# Patient Record
Sex: FEMALE | Race: OTHER | HISPANIC OR LATINO | Employment: FULL TIME | URBAN - METROPOLITAN AREA
[De-identification: names, ages, dates, MRNs, and addresses within clinical notes are randomized per-mention and may not be internally consistent; named-entity substitution may affect disease eponyms.]

---

## 2022-11-14 ENCOUNTER — OCCMED (OUTPATIENT)
Dept: URGENT CARE | Facility: CLINIC | Age: 47
End: 2022-11-14

## 2022-11-14 ENCOUNTER — APPOINTMENT (OUTPATIENT)
Dept: LAB | Facility: CLINIC | Age: 47
End: 2022-11-14

## 2022-11-14 DIAGNOSIS — Z02.1 ENCOUNTER FOR PRE-EMPLOYMENT HEALTH SCREENING EXAMINATION: Primary | ICD-10-CM

## 2022-11-14 DIAGNOSIS — Z02.1 ENCOUNTER FOR PRE-EMPLOYMENT HEALTH SCREENING EXAMINATION: ICD-10-CM

## 2022-11-14 LAB — RUBV IGG SERPL IA-ACNC: >175 IU/ML

## 2022-11-15 LAB
MEV IGG SER QL IA: NORMAL
MUV IGG SER QL IA: NORMAL
VZV IGG SER QL IA: NORMAL

## 2022-11-16 LAB
GAMMA INTERFERON BACKGROUND BLD IA-ACNC: 0.04 IU/ML
M TB IFN-G BLD-IMP: NEGATIVE
M TB IFN-G CD4+ BCKGRND COR BLD-ACNC: -0.01 IU/ML
M TB IFN-G CD4+ BCKGRND COR BLD-ACNC: 0 IU/ML
MITOGEN IGNF BCKGRD COR BLD-ACNC: >10 IU/ML

## 2023-01-12 ENCOUNTER — HOSPITAL ENCOUNTER (EMERGENCY)
Facility: HOSPITAL | Age: 48
Discharge: HOME | End: 2023-01-12
Attending: EMERGENCY MEDICINE

## 2023-01-12 VITALS
TEMPERATURE: 98.3 F | WEIGHT: 196.6 LBS | HEART RATE: 76 BPM | OXYGEN SATURATION: 100 % | SYSTOLIC BLOOD PRESSURE: 150 MMHG | RESPIRATION RATE: 20 BRPM | DIASTOLIC BLOOD PRESSURE: 93 MMHG

## 2023-01-12 DIAGNOSIS — R51.9 HEADACHE: Primary | ICD-10-CM

## 2023-01-12 DIAGNOSIS — M62.838 MUSCLE SPASMS OF NECK: ICD-10-CM

## 2023-01-12 RX ORDER — METHOCARBAMOL 500 MG/1
500 TABLET, FILM COATED ORAL ONCE
Status: COMPLETED | OUTPATIENT
Start: 2023-01-12 | End: 2023-01-12

## 2023-01-12 RX ORDER — METHOCARBAMOL 500 MG/1
500 TABLET, FILM COATED ORAL 2 TIMES DAILY
Qty: 20 TABLET | Refills: 0 | Status: SHIPPED | OUTPATIENT
Start: 2023-01-12

## 2023-01-12 RX ORDER — IBUPROFEN 600 MG/1
600 TABLET ORAL EVERY 6 HOURS PRN
Qty: 30 TABLET | Refills: 0 | Status: SHIPPED | OUTPATIENT
Start: 2023-01-12

## 2023-01-12 RX ORDER — KETOROLAC TROMETHAMINE 30 MG/ML
15 INJECTION, SOLUTION INTRAMUSCULAR; INTRAVENOUS ONCE
Status: COMPLETED | OUTPATIENT
Start: 2023-01-12 | End: 2023-01-12

## 2023-01-12 RX ADMIN — METHOCARBAMOL 500 MG: 500 TABLET, FILM COATED ORAL at 14:27

## 2023-01-12 RX ADMIN — KETOROLAC TROMETHAMINE 15 MG: 30 INJECTION, SOLUTION INTRAMUSCULAR; INTRAVENOUS at 14:27

## 2023-01-12 NOTE — Clinical Note
Terrence Sunnyshaan was seen and treated in our emergency department on 1/12/2023  Diagnosis:     Faraz Morris  is off the rest of the shift today  She may return on this date: 01/15/2023         If you have any questions or concerns, please don't hesitate to call        Jessenia Baptiste PA-C    ______________________________           _______________          _______________  Hospital Representative                              Date                                Time

## 2023-01-12 NOTE — ED PROVIDER NOTES
History  Chief Complaint   Patient presents with   • Headache   • Dizziness     Started 5 days ago, brain pain     52 y o  F with no reported PMH presents to ED c/o Intermittent headaches x 5 days  Worse with work, stress  This happened before, similar to previous headaches  History provided by:  Patient   used: Yes    Headache  Associated symptoms: dizziness and myalgias    Associated symptoms: no abdominal pain, no back pain, no blurred vision, no congestion, no cough, no diarrhea, no drainage, no ear pain, no eye pain, no facial pain, no fatigue, no fever, no focal weakness, no hearing loss, no loss of balance, no nausea, no near-syncope, no neck pain, no neck stiffness, no numbness, no paresthesias, no photophobia, no seizures, no sinus pressure, no sore throat, no swollen glands, no syncope, no tingling, no URI, no visual change, no vomiting and no weakness    Dizziness:     Severity:  Mild    Timing:  Sporadic  Dizziness  Quality:  Lightheadedness  Timing:  Sporadic  Progression:  Resolved  Associated symptoms: headaches    Associated symptoms: no blood in stool, no chest pain, no diarrhea, no hearing loss, no nausea, no palpitations, no shortness of breath, no syncope, no tinnitus, no vision changes, no vomiting and no weakness        None       History reviewed  No pertinent past medical history  History reviewed  No pertinent surgical history  History reviewed  No pertinent family history  I have reviewed and agree with the history as documented  E-Cigarette/Vaping     E-Cigarette/Vaping Substances     Social History     Tobacco Use   • Smoking status: Never   • Smokeless tobacco: Never   Substance Use Topics   • Alcohol use: Never   • Drug use: Never       Review of Systems   Constitutional: Negative for chills, fatigue and fever  HENT: Negative for congestion, ear pain, hearing loss, postnasal drip, sinus pressure, sore throat and tinnitus      Eyes: Negative for blurred vision, photophobia, pain, redness and visual disturbance  Respiratory: Negative for cough and shortness of breath  Cardiovascular: Negative for chest pain, palpitations, syncope and near-syncope  Gastrointestinal: Negative for abdominal pain, blood in stool, diarrhea, nausea and vomiting  Musculoskeletal: Positive for myalgias  Negative for back pain, gait problem, neck pain and neck stiffness  Skin: Negative for color change and rash  Neurological: Positive for dizziness and headaches  Negative for focal weakness, seizures, syncope, facial asymmetry, speech difficulty, weakness, numbness, paresthesias and loss of balance  All other systems reviewed and are negative  Physical Exam  Physical Exam  Vitals and nursing note reviewed  Constitutional:       General: She is awake  She is not in acute distress  Appearance: Normal appearance  She is well-developed  She is not diaphoretic  HENT:      Head: Normocephalic and atraumatic  No contusion, masses, right periorbital erythema or left periorbital erythema  Jaw: There is normal jaw occlusion  Right Ear: Tympanic membrane, ear canal and external ear normal       Left Ear: Tympanic membrane, ear canal and external ear normal       Nose: Nose normal  No congestion or rhinorrhea  Mouth/Throat:      Lips: Pink  Mouth: Mucous membranes are moist       Pharynx: Oropharynx is clear  Uvula midline  Eyes:      General: Lids are normal  Vision grossly intact  Gaze aligned appropriately  No visual field deficit  Right eye: No discharge  Left eye: No discharge  Extraocular Movements: Extraocular movements intact  Right eye: Normal extraocular motion and no nystagmus  Left eye: Normal extraocular motion and no nystagmus  Conjunctiva/sclera: Conjunctivae normal       Right eye: Right conjunctiva is not injected  Left eye: Left conjunctiva is not injected        Pupils: Pupils are equal, round, and reactive to light  Visual Fields: Right eye visual fields normal and left eye visual fields normal       Comments: No photophobia noted  Neck:      Trachea: Phonation normal      Cardiovascular:      Rate and Rhythm: Normal rate and regular rhythm  Heart sounds: Normal heart sounds  No murmur heard  Pulmonary:      Effort: Pulmonary effort is normal  No respiratory distress  Breath sounds: Normal breath sounds  Abdominal:      Palpations: Abdomen is soft  Tenderness: There is no abdominal tenderness  Musculoskeletal:         General: Normal range of motion  Cervical back: Full passive range of motion without pain, normal range of motion and neck supple  Spasms and tenderness present  No swelling, bony tenderness or crepitus  Muscular tenderness present  No pain with movement or spinous process tenderness  Normal range of motion  Right lower leg: No edema  Left lower leg: No edema  Comments: B/l trapezius muscle spasms    Lymphadenopathy:      Cervical: No cervical adenopathy  Skin:     General: Skin is warm  Capillary Refill: Capillary refill takes less than 2 seconds  Coloration: Skin is not pale  Findings: No rash  Neurological:      General: No focal deficit present  Mental Status: She is alert and oriented to person, place, and time  Sensory: No sensory deficit  Motor: No weakness  Coordination: Coordination normal       Gait: Gait normal       Comments: GCS 15  AAOx4  No focal neuro deficits  CN II-XII intact  PERRL  EOMI  No pronator drift   strength 5/5 bilaterally  B/L UE strength 5/5 throughout  Finger to nose, heel shin, rapid alternating movements Cerebellar function normal  Ambulates without difficulty  B/L LE strength 5/5 throughout   Gross sensation to b/l upper and lower extremities intact        Psychiatric:         Mood and Affect: Mood normal          Behavior: Behavior normal  Behavior is cooperative  Thought Content: Thought content normal          Vital Signs  ED Triage Vitals   Temperature Pulse Respirations Blood Pressure SpO2   01/12/23 1317 01/12/23 1317 01/12/23 1317 01/12/23 1317 01/12/23 1317   98 3 °F (36 8 °C) 76 20 150/93 100 %      Temp Source Heart Rate Source Patient Position - Orthostatic VS BP Location FiO2 (%)   01/12/23 1317 01/12/23 1317 01/12/23 1317 01/12/23 1317 --   Tympanic Monitor Sitting Left arm       Pain Score       01/12/23 1427       8           Vitals:    01/12/23 1317   BP: 150/93   Pulse: 76   Patient Position - Orthostatic VS: Sitting         Visual Acuity      ED Medications  Medications   ketorolac (TORADOL) injection 15 mg (15 mg Intramuscular Given 1/12/23 1427)   methocarbamol (ROBAXIN) tablet 500 mg (500 mg Oral Given 1/12/23 1427)       Diagnostic Studies  Results Reviewed     None                 No orders to display              Procedures  Procedures         ED Course  ED Course as of 01/14/23 0213   Thu Jan 12, 2023   1349 States she had a hysterectomy in 2011    1416 Has ride home    1446 Denies dizziness    1459 Reports improvement in headache                                              Medical Decision Making  Headache was not acute or maximal in onset  Headache similar to previous headaches  There were no focal neurodeficits on exam  Muscular tenderness on exam   Low clinical suspicion for CVA  Do not suspect SAH, temporal arteritis, meningitis, encephalitis, CO poisoning, acute angle closure glaucoma, dural venous sinus thrombosis as cause of headache  Do not feel that further imaging or workup (including LP) are warranted at this time  Suspect tension HA  Symptoms improved with treatment  Patient will follow up with PCP and return to ED if symptoms worsen or persist      All imaging and/or lab testing discussed with patient, strict return to ED precautions discussed   Patient recommended to follow up promptly with appropriate outpatient provider  Patient and/or family members verbalizes understanding and agrees with plan  Patient and/or family members were given opportunity to ask questions, all questions were answered at this time  Patient is stable for discharge      Portions of the record may have been created with voice recognition software  Occasional wrong word or "sound a like" substitutions may have occurred due to the inherent limitations of voice recognition software  Read the chart carefully and recognize, using context, where substitutions have occurred  Headache: acute illness or injury  Muscle spasms of neck: acute illness or injury  Risk  Prescription drug management  Disposition  Final diagnoses:   Headache   Muscle spasms of neck     Time reflects when diagnosis was documented in both MDM as applicable and the Disposition within this note     Time User Action Codes Description Comment    1/12/2023  2:31 PM Jona Ziyad Add [R51 9] Headache     1/12/2023  2:31 PM Dover Ziyad Add [P94 663] Muscle spasms of neck       ED Disposition     ED Disposition   Discharge    Condition   Stable    Date/Time   Thu Jan 12, 2023  2:40 PM    Comment   Olman Farley discharge to home/self care                 Follow-up Information     Follow up With Specialties Details Why Contact Info    DOROTEO Marr Nurse Practitioner Schedule an appointment as soon as possible for a visit  For follow up regarding your symptoms [address] [place] [address]  [phone]            Discharge Medication List as of 1/12/2023  3:00 PM      START taking these medications    Details   ibuprofen (MOTRIN) 600 mg tablet Take 1 tablet (600 mg total) by mouth every 6 (six) hours as needed for mild pain, moderate pain or headaches, Starting Thu 1/12/2023, Normal      methocarbamol (ROBAXIN) 500 mg tablet Take 1 tablet (500 mg total) by mouth 2 (two) times a day, Starting Thu 1/12/2023, Normal             No discharge procedures on file     PDMP Review     None          ED Provider  Electronically Signed by           Isela Doan PA-C  01/14/23 9674

## 2023-01-31 ENCOUNTER — PATIENT OUTREACH (OUTPATIENT)
Dept: OTHER | Facility: OTHER | Age: 48
End: 2023-01-31

## 2023-01-31 ENCOUNTER — TELEPHONE (OUTPATIENT)
Dept: ADMINISTRATIVE | Facility: OTHER | Age: 48
End: 2023-01-31

## 2023-01-31 ENCOUNTER — OFFICE VISIT (OUTPATIENT)
Dept: FAMILY MEDICINE CLINIC | Facility: CLINIC | Age: 48
End: 2023-01-31

## 2023-01-31 VITALS
WEIGHT: 200.6 LBS | OXYGEN SATURATION: 98 % | SYSTOLIC BLOOD PRESSURE: 122 MMHG | RESPIRATION RATE: 20 BRPM | HEIGHT: 69 IN | HEART RATE: 80 BPM | TEMPERATURE: 98.5 F | BODY MASS INDEX: 29.71 KG/M2 | DIASTOLIC BLOOD PRESSURE: 80 MMHG

## 2023-01-31 DIAGNOSIS — N95.1 MENOPAUSAL SYNDROME ON HORMONE REPLACEMENT THERAPY: ICD-10-CM

## 2023-01-31 DIAGNOSIS — Z00.00 ANNUAL PHYSICAL EXAM: ICD-10-CM

## 2023-01-31 DIAGNOSIS — Z76.89 ENCOUNTER TO ESTABLISH CARE: Primary | ICD-10-CM

## 2023-01-31 DIAGNOSIS — Z79.890 MENOPAUSAL SYNDROME ON HORMONE REPLACEMENT THERAPY: ICD-10-CM

## 2023-01-31 DIAGNOSIS — E66.3 OVERWEIGHT (BMI 25.0-29.9): ICD-10-CM

## 2023-01-31 DIAGNOSIS — M79.671 RIGHT FOOT PAIN: ICD-10-CM

## 2023-01-31 DIAGNOSIS — Z11.4 SCREENING FOR HIV (HUMAN IMMUNODEFICIENCY VIRUS): ICD-10-CM

## 2023-01-31 DIAGNOSIS — Z12.11 SCREENING FOR COLON CANCER: ICD-10-CM

## 2023-01-31 DIAGNOSIS — E11.9 TYPE 2 DIABETES MELLITUS WITHOUT COMPLICATION, WITHOUT LONG-TERM CURRENT USE OF INSULIN (HCC): ICD-10-CM

## 2023-01-31 DIAGNOSIS — Z11.59 NEED FOR HEPATITIS C SCREENING TEST: ICD-10-CM

## 2023-01-31 DIAGNOSIS — K21.9 GASTROESOPHAGEAL REFLUX DISEASE WITHOUT ESOPHAGITIS: ICD-10-CM

## 2023-01-31 DIAGNOSIS — E55.9 VITAMIN D DEFICIENCY: ICD-10-CM

## 2023-01-31 DIAGNOSIS — E78.1 HYPERTRIGLYCERIDEMIA: ICD-10-CM

## 2023-01-31 DIAGNOSIS — G62.9 NEUROPATHY: ICD-10-CM

## 2023-01-31 PROBLEM — E78.5 HYPERLIPIDEMIA: Status: ACTIVE | Noted: 2022-08-14

## 2023-01-31 LAB
CREAT UR-MCNC: 73.3 MG/DL
LEFT EYE DIABETIC RETINOPATHY: NORMAL
LEFT EYE IMAGE QUALITY: NORMAL
LEFT EYE MACULAR EDEMA: NORMAL
LEFT EYE OTHER RETINOPATHY: NORMAL
MICROALBUMIN UR-MCNC: 29.1 MG/L (ref 0–20)
MICROALBUMIN/CREAT 24H UR: 40 MG/G CREATININE (ref 0–30)
RIGHT EYE DIABETIC RETINOPATHY: NORMAL
RIGHT EYE IMAGE QUALITY: NORMAL
RIGHT EYE MACULAR EDEMA: NORMAL
RIGHT EYE OTHER RETINOPATHY: NORMAL
SEVERITY (EYE EXAM): NORMAL

## 2023-01-31 RX ORDER — METFORMIN HYDROCHLORIDE 1000 MG/1
1000 TABLET, FILM COATED, EXTENDED RELEASE ORAL 2 TIMES DAILY WITH MEALS
Qty: 180 TABLET | Refills: 1 | Status: SHIPPED | OUTPATIENT
Start: 2023-01-31 | End: 2023-02-02 | Stop reason: CLARIF

## 2023-01-31 RX ORDER — LANCETS 33 GAUGE
EACH MISCELLANEOUS
Qty: 100 EACH | Refills: 3 | Status: SHIPPED | OUTPATIENT
Start: 2023-01-31

## 2023-01-31 RX ORDER — LOSARTAN POTASSIUM 50 MG/1
50 TABLET ORAL DAILY
Qty: 90 TABLET | Refills: 1 | Status: SHIPPED | OUTPATIENT
Start: 2023-01-31

## 2023-01-31 RX ORDER — ESTRADIOL 0.1 MG/D
1 FILM, EXTENDED RELEASE TRANSDERMAL 2 TIMES WEEKLY
COMMUNITY
Start: 2022-10-17 | End: 2023-10-17

## 2023-01-31 RX ORDER — LOSARTAN POTASSIUM 50 MG/1
50 TABLET ORAL DAILY
COMMUNITY
End: 2023-01-31 | Stop reason: SDUPTHER

## 2023-01-31 RX ORDER — ATORVASTATIN CALCIUM 40 MG/1
40 TABLET, FILM COATED ORAL DAILY
COMMUNITY
End: 2023-02-03 | Stop reason: ALTCHOICE

## 2023-01-31 RX ORDER — LANSOPRAZOLE 30 MG/1
TABLET, ORALLY DISINTEGRATING, DELAYED RELEASE ORAL EVERY 24 HOURS
COMMUNITY
End: 2023-01-31 | Stop reason: ALTCHOICE

## 2023-01-31 RX ORDER — MELOXICAM 7.5 MG/1
15 TABLET ORAL DAILY
Qty: 60 TABLET | Refills: 0 | Status: SHIPPED | OUTPATIENT
Start: 2023-01-31

## 2023-01-31 RX ORDER — METFORMIN HYDROCHLORIDE 1000 MG/1
1000 TABLET, FILM COATED, EXTENDED RELEASE ORAL
COMMUNITY
End: 2023-01-31 | Stop reason: SDUPTHER

## 2023-01-31 RX ORDER — FAMOTIDINE 20 MG/1
20 TABLET, FILM COATED ORAL DAILY
COMMUNITY

## 2023-01-31 RX ORDER — ATORVASTATIN CALCIUM 20 MG/1
40 TABLET, FILM COATED ORAL DAILY
COMMUNITY
End: 2023-01-31 | Stop reason: DRUGHIGH

## 2023-01-31 RX ORDER — BLOOD SUGAR DIAGNOSTIC
STRIP MISCELLANEOUS
Qty: 100 EACH | Refills: 3 | Status: SHIPPED | OUTPATIENT
Start: 2023-01-31

## 2023-01-31 RX ORDER — BLOOD-GLUCOSE METER
KIT MISCELLANEOUS
Qty: 1 KIT | Refills: 0 | Status: SHIPPED | OUTPATIENT
Start: 2023-01-31

## 2023-01-31 RX ORDER — PREGABALIN 150 MG/1
150 CAPSULE ORAL 2 TIMES DAILY
COMMUNITY
End: 2023-01-31 | Stop reason: ALTCHOICE

## 2023-01-31 NOTE — PROGRESS NOTES
1/31/23: Client reported to her   director that she has not had her diabetes medication for 8 days  She was not feeling well  But she had difficulty making an appointment with her new insurance (hospital: [facility])  Gathered information to make appointment; suggested she go to Dr Apryl Pink  She agreed  Called Dr Abelardo Pichardo answering service who returned the call and connected me to the , Roni Ayala, of his office  She was able to make an appointment at 3 PM today for the client  Informed Zafar Steel who will give the information to Renaldo

## 2023-01-31 NOTE — TELEPHONE ENCOUNTER
----- Message from Kvng Pruitt RN sent at 1/31/2023  9:54 AM EST -----  Regarding: mammo  01/31/23 9:54 AM    Hello, our patient Fernie Mcnally has had Mammogram completed/performed  Please assist in updating the patient chart by pulling the Care Everywhere (CE) document  The date of service is 9/2022       Thank you,  Kvng Pruitt RN   [address] [facility]

## 2023-01-31 NOTE — ASSESSMENT & PLAN NOTE
Failed Gabapentin 800mg as caused dizziness and drowsiness, Lyrica 150mg BID did not help, and Cymbalta 30mg BID with no change for b/l foot nerve pain  Will check EMG due to multiple failed medications and previous diagnosis of neuropathic pain

## 2023-01-31 NOTE — PROGRESS NOTES
[address] [facility] PRIMARY CARE [facility]    NAME: Shady Hsu  AGE: 52 y o  SEX: female  : 1975     DATE: 2023     Assessment and Plan:     Problem List Items Addressed This Visit        Digestive    Gastroesophageal reflux disease without esophagitis     Continue famotidine as needed  Avoid dietary triggers, no previous EGD  Relevant Medications    famotidine (PEPCID) 20 mg tablet       Endocrine    Type 2 diabetes mellitus (HCC)     Uncontrolled  Recently moved to the [place] about 6 months ago  Has not taken metformin in 8 days  Does not recall starting Januvia  Will restart both medications at max dose  Consider GLP-1, caution with risk of pancreatitis, no history in the past, known history of very high triglycerides  Start losartan 50mg, patient was taking her sister-in-law's supply due to headaches as they seemed to help  Continue atorvastatin 40mg previously taking, consider switch to rosuvastatin pending labs  Lab Results   Component Value Date    HGBA1C 8 9 (A) 2023            Relevant Medications    losartan (COZAAR) 50 mg tablet    metFORMIN (GLUMETZA) 1000 MG (MOD) 24 hr tablet    sitaGLIPtin (JANUVIA) 100 mg tablet    Blood Glucose Monitoring Suppl (OneTouch Verio Reflect) w/Device KIT    glucose blood (OneTouch Verio) test strip    OneTouch Delica Lancets 89O MISC    Other Relevant Orders    POCT hemoglobin A1c (Completed)    Microalbumin / creatinine urine ratio (Completed)    IRIS Diabetic eye exam (Completed)       Nervous and Auditory    Neuropathy     Failed Gabapentin 800mg as caused dizziness and drowsiness, Lyrica 150mg BID did not help, and Cymbalta 30mg BID with no change for b/l foot nerve pain  Will check EMG due to multiple failed medications and previous diagnosis of neuropathic pain            Relevant Orders    EMG 2 limb lower extremity    Vitamin B12    Folate    TSH, 3rd generation with Free T4 reflex    Vitamin D 25 hydroxy    C-reactive protein       Other    Hypertriglyceridemia     Last trigs 849 in 8/2022, unable to calculate LDL  Continue atorvastatin 40mg for now, likely switch to rosuvastatin pending repeat labs  Relevant Medications    atorvastatin (LIPITOR) 40 mg tablet    Other Relevant Orders    Comprehensive metabolic panel    Lipid panel    Menopausal syndrome on hormone replacement therapy     History of hysterectomy and b/l SPO in 2011  Previously on oral estrogen but discontinued due to high CV risk, switched to estradiol patches with significant improvement  Recommend establish care with [facility]  Relevant Orders    Ambulatory Referral to Gynecology    Overweight (BMI 25 0-29  9)     BMI Counseling: Body mass index is 29 62 kg/m²  The BMI is above normal  Nutrition recommendations include reducing portion sizes, decreasing overall calorie intake and 3-5 servings of fruits/vegetables daily  Exercise recommendations include exercising 3-5 times per week and strength training exercises  Right foot pain     Suspect possible plantar fasciitis, recommend frozen water bottle exercises, consider podiatry referral  Ordered Xray due to swelling present posterior calcaneus  Start meloxicam, avoid other NSAIDs            Relevant Medications    meloxicam (Mobic) 7 5 mg tablet    Other Relevant Orders    XR foot 3+ vw right   Other Visit Diagnoses     Encounter to establish care    -  Primary    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[facility], works in nutrition services, 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 8 days    Annual physical exam        Relevant Orders    CBC and differential    Screening for HIV (human immunodeficiency virus)        Relevant Orders    : HIV 1/2 AB/AG w Reflex [facility] for 2 yr old and above    Need for hepatitis C screening test        Relevant Orders    Hepatitis C antibody    Screening for colon cancer        no previous colon CA screening, no family history    Relevant Orders    Cologuard          Immunizations and preventive care screenings were discussed with patient today  Appropriate education was printed on patient's after visit summary  Counseling:  Alcohol/drug use: discussed moderation in alcohol intake, the recommendations for healthy alcohol use, and avoidance of illicit drug use  Dental Health: discussed importance of regular tooth brushing, flossing, and dental visits  Injury prevention: discussed safety/seat belts, safety helmets, smoke detectors, carbon dioxide detectors, and smoking near bedding or upholstery  Sexual health: discussed sexually transmitted diseases, partner selection, use of condoms, avoidance of unintended pregnancy, and contraceptive alternatives  · Exercise: the importance of regular exercise/physical activity was discussed  Recommend exercise 3-5 times per week for at least 30 minutes  Depression Screening and Follow-up Plan: Patient was screened for depression during today's encounter  They screened negative with a PHQ-2 score of 0  Return in about 4 weeks (around 2/28/2023)  Chief Complaint:     Chief Complaint   Patient presents with   • Establish Care   • Diabetes      History of Present Illness:     Adult Annual Physical   Patient here for a comprehensive physical exam  The patint reports no problems  November 2022 was last visit with 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  She had uterus and bilateral ovaries removed which resulted in severe hot flashes which improved with hormone replacement  She has tried multiple pain medicines for neuropathy and has a long history of uncontrolled diabetes  She has not taken any of her medication in 8 days due to lack of refills and starting new job with new insurance  She has been taking her sister-in-law's losartan due to headaches that have improved       History was conducted in Croatian without the use of   Diet and Physical Activity  · Diet/Nutrition: well balanced diet  · Exercise: no formal exercise  Depression Screening  PHQ-2/9 Depression Screening    Little interest or pleasure in doing things: 0 - not at all  Feeling down, depressed, or hopeless: 0 - not at all  PHQ-2 Score: 0  PHQ-2 Interpretation: Negative depression screen       General Health  · Sleep: gets 4-6 hours of sleep on average  · Hearing: normal - bilateral   · Vision: wears glasses  to read  · Dental: regular dental visits and brushes teeth twice daily  /GYN Health  · Patient is: postmenopausal  · Last menstrual period:   · Contraceptive method: hysterectomy  Review of Systems:     Review of Systems   Constitutional: Negative for chills and fever  HENT: Negative for ear pain and sore throat  Eyes: Negative for pain and visual disturbance  Respiratory: Negative for cough and shortness of breath  Cardiovascular: Negative for chest pain and palpitations  Gastrointestinal: Negative for abdominal pain and vomiting  Genitourinary: Negative for dysuria and hematuria  Musculoskeletal: Positive for arthralgias, joint swelling and myalgias  Negative for back pain  Skin: Negative for color change and rash  Neurological: Positive for numbness  Negative for tremors, seizures, syncope, weakness and light-headedness  All other systems reviewed and are negative  Past Medical History:     History reviewed  No pertinent past medical history     Past Surgical History:     Past Surgical History:   Procedure Laterality Date   •  SECTION       and    • HYSTERECTOMY W/ SALPINGO-OOPHERECTOMY Bilateral     in [address]      Social History:     Social History     Socioeconomic History   • Marital status: /Civil Union     Spouse name: None   • Number of children: None   • Years of education: None   • Highest education level: None Occupational History   • None   Tobacco Use   • Smoking status: Never   • Smokeless tobacco: Never   Substance and Sexual Activity   • Alcohol use: Never   • Drug use: Never   • Sexual activity: None   Other Topics Concern   • None   Social History Narrative   • None     Social Determinants of Health     Financial Resource Strain: Not on file   Food Insecurity: Not on file   Transportation Needs: Not on file   Physical Activity: Not on file   Stress: Not on file   Social Connections: Not on file   Intimate Partner Violence: Not on file   Housing Stability: Not on file      Family History:     History reviewed  No pertinent family history  Current Medications:     Current Outpatient Medications   Medication Sig Dispense Refill   • atorvastatin (LIPITOR) 40 mg tablet Take 40 mg by mouth daily     • Blood Glucose Monitoring Suppl (OneTouch Verio Reflect) w/Device KIT Check blood sugars once daily  Please substitute with appropriate alternative as covered by patient's insurance  Dx: E11 65 1 kit 0   • estradiol (VIVELLE-DOT) 0 1 MG/24HR Place 1 patch on the skin 2 (two) times a week     • famotidine (PEPCID) 20 mg tablet Take 20 mg by mouth in the morning     • glucose blood (OneTouch Verio) test strip Check blood sugars once daily  Please substitute with appropriate alternative as covered by patient's insurance  Dx: E11 65 100 each 3   • losartan (COZAAR) 50 mg tablet Take 1 tablet (50 mg total) by mouth daily 90 tablet 1   • meloxicam (Mobic) 7 5 mg tablet Take 2 tablets (15 mg total) by mouth daily Para dolor del pie 60 tablet 0   • metFORMIN (GLUMETZA) 1000 MG (MOD) 24 hr tablet Take 1 tablet (1,000 mg total) by mouth 2 (two) times a day with meals Maryann rica pastilla dos veces al jose para la diabetes 180 tablet 1   • OneTouch Delica Lancets 68N MISC Check blood sugars once daily  Please substitute with appropriate alternative as covered by patient's insurance   Dx: E11 65 100 each 3   • sitaGLIPtin (JANUVIA) 100 mg tablet Take 1 tablet (100 mg total) by mouth daily Maryann garcia jose para la diabetes 90 tablet 1     No current facility-administered medications for this visit  Allergies:     No Known Allergies   Physical Exam:     /80 (BP Location: Left arm, Patient Position: Sitting, Cuff Size: Standard)   Pulse 80   Temp 98 5 °F (36 9 °C) (Tympanic)   Resp 20   Ht 5' 9" (1 753 m)   Wt 91 kg (200 lb 9 6 oz)   SpO2 98%   BMI 29 62 kg/m²     Physical Exam  Vitals and nursing note reviewed  Constitutional:       General: She is not in acute distress  Appearance: She is well-developed  HENT:      Head: Normocephalic and atraumatic  Right Ear: Tympanic membrane, ear canal and external ear normal       Left Ear: Tympanic membrane, ear canal and external ear normal    Eyes:      Extraocular Movements: Extraocular movements intact  Conjunctiva/sclera: Conjunctivae normal       Pupils: Pupils are equal, round, and reactive to light  Cardiovascular:      Rate and Rhythm: Normal rate and regular rhythm  Pulses: no weak pulses          Dorsalis pedis pulses are 2+ on the right side and 2+ on the left side  Posterior tibial pulses are 2+ on the right side and 2+ on the left side  Heart sounds: No murmur heard  Pulmonary:      Effort: Pulmonary effort is normal  No respiratory distress  Breath sounds: Normal breath sounds  Abdominal:      Palpations: Abdomen is soft  Tenderness: There is no abdominal tenderness  Musculoskeletal:         General: No swelling  Cervical back: Neck supple  Feet:    Feet:      Right foot:      Skin integrity: No ulcer, skin breakdown, erythema, warmth, callus or dry skin  Left foot:      Skin integrity: No ulcer, skin breakdown, erythema, warmth, callus or dry skin  Skin:     General: Skin is warm and dry  Capillary Refill: Capillary refill takes less than 2 seconds     Neurological:      Mental Status: She is alert    Psychiatric:         Mood and Affect: Mood normal         Diabetic Foot Exam    Patient's shoes and socks removed  Right Foot/Ankle   Right Foot Inspection  Skin Exam: skin normal and skin intact  No dry skin, no warmth, no callus, no erythema, no maceration, no abnormal color, no pre-ulcer, no ulcer and no callus  Toe Exam: ROM and strength within normal limits  Sensory   Monofilament testing: diminished    Vascular  Capillary refills: < 3 seconds  The right DP pulse is 2+  The right PT pulse is 2+  Left Foot/Ankle  Left Foot Inspection  Skin Exam: skin normal and skin intact  No dry skin, no warmth, no erythema, no maceration, normal color, no pre-ulcer, no ulcer and no callus  Toe Exam: ROM and strength within normal limits  Sensory   Monofilament testing: intact    Vascular  Capillary refills: < 3 seconds  The left DP pulse is 2+  The left PT pulse is 2+       Assign Risk Category  No deformity present  No loss of protective sensation  No weak pulses  Risk: 0        Kamlila Burgos PA-C  [address]

## 2023-01-31 NOTE — TELEPHONE ENCOUNTER
Upon review of the In Basket request we were able to locate, review, and update the patient chart as requested for Mammogram     Any additional questions or concerns should be emailed to the Practice Liaisons via the appropriate education email address, please do not reply via In Basket      Thank you  Elbert Sheehan

## 2023-02-01 PROBLEM — E66.3 OVERWEIGHT (BMI 25.0-29.9): Status: ACTIVE | Noted: 2023-02-01

## 2023-02-01 PROBLEM — M79.671 RIGHT FOOT PAIN: Status: ACTIVE | Noted: 2023-02-01

## 2023-02-01 PROBLEM — Z79.890 MENOPAUSAL SYNDROME ON HORMONE REPLACEMENT THERAPY: Status: ACTIVE | Noted: 2022-10-11

## 2023-02-01 LAB — SL AMB POCT HEMOGLOBIN AIC: 8.9 (ref ?–6.5)

## 2023-02-01 NOTE — ASSESSMENT & PLAN NOTE
Uncontrolled  Recently moved to the [place] about 6 months ago  Has not taken metformin in 8 days  Does not recall starting Januvia  Will restart both medications at max dose  Consider GLP-1, caution with risk of pancreatitis, no history in the past, known history of very high triglycerides  Start losartan 50mg, patient was taking her sister-in-law's supply due to headaches as they seemed to help  Continue atorvastatin 40mg previously taking, consider switch to rosuvastatin pending labs     Lab Results   Component Value Date    HGBA1C 8 9 (A) 02/01/2023

## 2023-02-01 NOTE — ASSESSMENT & PLAN NOTE
Suspect possible plantar fasciitis, recommend frozen water bottle exercises, consider podiatry referral  Ordered Xray due to swelling present posterior calcaneus  Start meloxicam, avoid other NSAIDs

## 2023-02-01 NOTE — ASSESSMENT & PLAN NOTE
BMI Counseling: Body mass index is 29 62 kg/m²  The BMI is above normal  Nutrition recommendations include reducing portion sizes, decreasing overall calorie intake and 3-5 servings of fruits/vegetables daily  Exercise recommendations include exercising 3-5 times per week and strength training exercises

## 2023-02-01 NOTE — ASSESSMENT & PLAN NOTE
History of hysterectomy and b/l SPO in 2011  Previously on oral estrogen but discontinued due to high CV risk, switched to estradiol patches with significant improvement  Recommend establish care with [facility]

## 2023-02-01 NOTE — ASSESSMENT & PLAN NOTE
Last trigs 849 in 8/2022, unable to calculate LDL  Continue atorvastatin 40mg for now, likely switch to rosuvastatin pending repeat labs

## 2023-02-02 DIAGNOSIS — E11.69 TYPE 2 DIABETES MELLITUS WITH OTHER SPECIFIED COMPLICATION, UNSPECIFIED WHETHER LONG TERM INSULIN USE (HCC): Primary | ICD-10-CM

## 2023-02-02 NOTE — TELEPHONE ENCOUNTER
Insurance will not pay for metFORMIN (GLUMETZA) 1000 MG (MOD) 24 hr tablet has to be the regular metformin   Order placed

## 2023-02-03 ENCOUNTER — APPOINTMENT (OUTPATIENT)
Dept: LAB | Facility: HOSPITAL | Age: 48
End: 2023-02-03

## 2023-02-03 DIAGNOSIS — G62.9 NEUROPATHY: ICD-10-CM

## 2023-02-03 DIAGNOSIS — E78.1 HYPERTRIGLYCERIDEMIA: ICD-10-CM

## 2023-02-03 DIAGNOSIS — Z11.59 NEED FOR HEPATITIS C SCREENING TEST: ICD-10-CM

## 2023-02-03 DIAGNOSIS — Z11.4 SCREENING FOR HIV (HUMAN IMMUNODEFICIENCY VIRUS): ICD-10-CM

## 2023-02-03 DIAGNOSIS — Z00.00 ANNUAL PHYSICAL EXAM: ICD-10-CM

## 2023-02-03 LAB
25(OH)D3 SERPL-MCNC: 17.6 NG/ML (ref 30–100)
ALBUMIN SERPL BCP-MCNC: 4.4 G/DL (ref 3.5–5)
ALP SERPL-CCNC: 58 U/L (ref 43–122)
ALT SERPL W P-5'-P-CCNC: 17 U/L
ANION GAP SERPL CALCULATED.3IONS-SCNC: 9 MMOL/L (ref 5–14)
AST SERPL W P-5'-P-CCNC: 20 U/L (ref 14–36)
BASOPHILS # BLD AUTO: 0.04 THOUSANDS/ÂΜL (ref 0–0.1)
BASOPHILS NFR BLD AUTO: 1 % (ref 0–1)
BILIRUB SERPL-MCNC: 0.72 MG/DL (ref 0.2–1)
BUN SERPL-MCNC: 10 MG/DL (ref 5–25)
CALCIUM SERPL-MCNC: 9.3 MG/DL (ref 8.4–10.2)
CHLORIDE SERPL-SCNC: 104 MMOL/L (ref 96–108)
CHOLEST SERPL-MCNC: 238 MG/DL
CO2 SERPL-SCNC: 25 MMOL/L (ref 21–32)
CREAT SERPL-MCNC: 0.67 MG/DL (ref 0.6–1.2)
CRP SERPL QL: <5 MG/L
EOSINOPHIL # BLD AUTO: 0.19 THOUSAND/ÂΜL (ref 0–0.61)
EOSINOPHIL NFR BLD AUTO: 3 % (ref 0–6)
ERYTHROCYTE [DISTWIDTH] IN BLOOD BY AUTOMATED COUNT: 13.4 % (ref 11.6–15.1)
FOLATE SERPL-MCNC: >20 NG/ML (ref 3.1–17.5)
GFR SERPL CREATININE-BSD FRML MDRD: 105 ML/MIN/1.73SQ M
GLUCOSE P FAST SERPL-MCNC: 191 MG/DL (ref 70–99)
HCT VFR BLD AUTO: 41.9 % (ref 34.8–46.1)
HCV AB SER QL: NORMAL
HDLC SERPL-MCNC: 40 MG/DL
HGB BLD-MCNC: 13.4 G/DL (ref 11.5–15.4)
HIV 1+2 AB+HIV1 P24 AG SERPL QL IA: NORMAL
HIV 2 AB SERPL QL IA: NORMAL
HIV1 AB SERPL QL IA: NORMAL
HIV1 P24 AG SERPL QL IA: NORMAL
IMM GRANULOCYTES # BLD AUTO: 0.01 THOUSAND/UL (ref 0–0.2)
IMM GRANULOCYTES NFR BLD AUTO: 0 % (ref 0–2)
LDLC SERPL CALC-MCNC: 123 MG/DL
LYMPHOCYTES # BLD AUTO: 2.21 THOUSANDS/ÂΜL (ref 0.6–4.47)
LYMPHOCYTES NFR BLD AUTO: 36 % (ref 14–44)
MCH RBC QN AUTO: 27.7 PG (ref 26.8–34.3)
MCHC RBC AUTO-ENTMCNC: 32 G/DL (ref 31.4–37.4)
MCV RBC AUTO: 87 FL (ref 82–98)
MONOCYTES # BLD AUTO: 0.32 THOUSAND/ÂΜL (ref 0.17–1.22)
MONOCYTES NFR BLD AUTO: 5 % (ref 4–12)
NEUTROPHILS # BLD AUTO: 3.36 THOUSANDS/ÂΜL (ref 1.85–7.62)
NEUTS SEG NFR BLD AUTO: 55 % (ref 43–75)
NONHDLC SERPL-MCNC: 198 MG/DL
NRBC BLD AUTO-RTO: 0 /100 WBCS
PLATELET # BLD AUTO: 265 THOUSANDS/UL (ref 149–390)
PMV BLD AUTO: 10.7 FL (ref 8.9–12.7)
POTASSIUM SERPL-SCNC: 4.3 MMOL/L (ref 3.5–5.3)
PROT SERPL-MCNC: 7.7 G/DL (ref 6.4–8.4)
RBC # BLD AUTO: 4.84 MILLION/UL (ref 3.81–5.12)
SODIUM SERPL-SCNC: 138 MMOL/L (ref 135–147)
TRIGL SERPL-MCNC: 373 MG/DL
TSH SERPL DL<=0.05 MIU/L-ACNC: 3.49 UIU/ML (ref 0.45–4.5)
VIT B12 SERPL-MCNC: 1907 PG/ML (ref 100–900)
WBC # BLD AUTO: 6.13 THOUSAND/UL (ref 4.31–10.16)

## 2023-02-03 RX ORDER — ERGOCALCIFEROL 1.25 MG/1
50000 CAPSULE ORAL WEEKLY
Qty: 12 CAPSULE | Refills: 1 | Status: SHIPPED | OUTPATIENT
Start: 2023-02-03

## 2023-02-03 RX ORDER — ROSUVASTATIN CALCIUM 20 MG/1
20 TABLET, COATED ORAL DAILY
Qty: 90 TABLET | Refills: 3 | Status: SHIPPED | OUTPATIENT
Start: 2023-02-03

## 2023-02-17 DIAGNOSIS — N95.1 MENOPAUSAL SYMPTOM: Primary | ICD-10-CM

## 2023-02-17 RX ORDER — ESTRADIOL 0.1 MG/D
1 FILM, EXTENDED RELEASE TRANSDERMAL 2 TIMES WEEKLY
Qty: 8 PATCH | Refills: 11 | Status: SHIPPED | OUTPATIENT
Start: 2023-02-20 | End: 2024-02-20

## 2023-02-28 ENCOUNTER — OFFICE VISIT (OUTPATIENT)
Dept: FAMILY MEDICINE CLINIC | Facility: CLINIC | Age: 48
End: 2023-02-28

## 2023-02-28 VITALS
SYSTOLIC BLOOD PRESSURE: 120 MMHG | BODY MASS INDEX: 29.38 KG/M2 | RESPIRATION RATE: 20 BRPM | OXYGEN SATURATION: 98 % | HEIGHT: 69 IN | HEART RATE: 69 BPM | TEMPERATURE: 97.5 F | WEIGHT: 198.4 LBS | DIASTOLIC BLOOD PRESSURE: 78 MMHG

## 2023-02-28 DIAGNOSIS — G62.9 NEUROPATHY: ICD-10-CM

## 2023-02-28 DIAGNOSIS — R42 VERTIGO: ICD-10-CM

## 2023-02-28 DIAGNOSIS — M54.2 NECK PAIN: ICD-10-CM

## 2023-02-28 DIAGNOSIS — E11.9 TYPE 2 DIABETES MELLITUS WITHOUT COMPLICATION, WITHOUT LONG-TERM CURRENT USE OF INSULIN (HCC): Primary | ICD-10-CM

## 2023-02-28 DIAGNOSIS — E78.1 HYPERTRIGLYCERIDEMIA: ICD-10-CM

## 2023-02-28 RX ORDER — DULOXETIN HYDROCHLORIDE 30 MG/1
CAPSULE, DELAYED RELEASE ORAL
COMMUNITY
Start: 2023-01-26

## 2023-02-28 RX ORDER — ISOPROPYL ALCOHOL 0.7 ML/ML
SWAB TOPICAL
COMMUNITY
Start: 2022-12-13

## 2023-02-28 RX ORDER — MECLIZINE HCL 12.5 MG/1
12.5 TABLET ORAL 3 TIMES DAILY PRN
Qty: 30 TABLET | Refills: 0 | Status: SHIPPED | OUTPATIENT
Start: 2023-02-28

## 2023-02-28 RX ORDER — FENOFIBRATE 160 MG/1
TABLET ORAL
COMMUNITY
Start: 2023-01-26 | End: 2023-02-28 | Stop reason: ALTCHOICE

## 2023-02-28 NOTE — ASSESSMENT & PLAN NOTE
Not well controlled  Restarted metformin twice daily and still with fasting sugars in the 240s in the mornings and 160s postprandial   She has been afraid to eat any carbs due to elevated blood sugars  Ran out of Januvia, will switch in favor of Trulicity once weekly 6 64 mg  Continue losartan 50 mg  Switch to rosuvastatin due to elevated triglycerides  Follow-up in 3 months  Discussed importance of diet and exercise    Lab Results   Component Value Date    HGBA1C 8 9 (A) 02/01/2023

## 2023-02-28 NOTE — PROGRESS NOTES
Name: Mayela Degroot      : 1975      MRN: [id number]  Encounter Provider: Kamilla Burgos PA-C  Encounter Date: 2023   Encounter department: [facility] [facility]     1  Type 2 diabetes mellitus without complication, without long-term current use of insulin ([facility] [address] )  Assessment & Plan:  Not well controlled  Restarted metformin twice daily and still with fasting sugars in the 240s in the mornings and 160s postprandial   She has been afraid to eat any carbs due to elevated blood sugars  Ran out of Januvia, will switch in favor of Trulicity once weekly 8 58 mg  Continue losartan 50 mg  Switch to rosuvastatin due to elevated triglycerides  Follow-up in 3 months  Discussed importance of diet and exercise  Lab Results   Component Value Date    HGBA1C 8 9 (A) 2023       Orders:  -     glucose blood test strip; Use as instructed  -     dulaglutide (Trulicity) 9 28 EM/0 4VG injection; Inject 0 5 mL (0 75 mg total) under the skin every 7 days    2  Vertigo  Assessment & Plan:  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 triggering seconds of dizziness and room spinning  Positive Brianna-Hallpike maneuver on right side in office today  We will start meclizine as needed  Consider ENT referral if no improvement  Orders:  -     meclizine (ANTIVERT) 12 5 MG tablet; Take 1 tablet (12 5 mg total) by mouth 3 (three) times a day as needed for dizziness si necesita para los mareos    3  Hypertriglyceridemia  Assessment & Plan:  Lab Results   Component Value Date    [address] 2023     Still with significantly elevated triglycerides although improved from previous 800s  Switch to rosuvastatin 20 mg once daily  4  Neck pain  Assessment & Plan:  Posterior neck pain triggering headaches infrequently  [place]lof[place]ac [place]  Will refill short supply as needed  Discussed risk of NSAID overuse  Do not combine with meloxicam or ibuprofen  Start exercises and stretching  5  Neuropathy  Assessment & Plan:  Failed multiple medications including gabapentin, pregabalin  On Cymbalta 30 mg twice daily  Ordered EMG last visit but did not complete, recommend completed establish diagnosis  With intermittent headaches  Start diclofenac as needed only and do not mix with other NSAIDs  Orders:  -     diclofenac sodium (VOLTAREN) 50 mg EC tablet; Take 1 tablet (50 mg total) by mouth 2 (two) times a day as needed (headache) Para shanon Connors is a 52 y o  female with a h/o diabetes, HLD, HTN who presents for routine follow-up after establishing care 1 month ago for diabetes  She has been having episodes of positional dizziness worse with rapid head movements when she is working in [employer]  The dizziness lasts several seconds and spontaneously resolved  They happen daily  No associated vision changes  She has been getting worsening and blurry vision  She does not use glasses  She also has infrequent headaches associated with posterior neck pain that she has taken diclofenac in the past with improvement  She has not picked up rosuvastatin yet and has been taking atorvastatin  Her sugars have been ranging in the 200s to 240s in the mornings without eating  After meals she ranges in the 160s and is afraid to eat  Her last sugar was 167 and she ate broccoli about 2 hours ago  She still has foot pain but it comes and goes and has improved  She has not noticed a difference on Cymbalta for neuropathy and has no history of depression  No smoking or alcohol use  History was conducted in [place]an without the use of   Review of Systems   Constitutional: Negative for fever and unexpected weight change  Eyes: Negative for photophobia and visual disturbance  Respiratory: Negative for shortness of breath  Cardiovascular: Negative for chest pain  Musculoskeletal: Positive for myalgias and neck pain  Neurological: Positive for dizziness, numbness and headaches  Negative for seizures, syncope and light-headedness  Current Outpatient Medications on File Prior to Visit   Medication Sig   • Alcohol Swabs (Pharmacist Choice Alcohol) PADS TEST EVERY DAY BEFORE AND AFTER EACH USE   • Blood Glucose Monitoring Suppl (OneTouch Verio Reflect) w/Device KIT Check blood sugars once daily  Please substitute with appropriate alternative as covered by patient's insurance  Dx: E11 65   • DULoxetine (CYMBALTA) 30 mg delayed release capsule TAKE 1 CAPSULE TWICE A DAY BY ORAL ROUTE FOR 30 DAYS  • ergocalciferol (VITAMIN D2) 50,000 units Take 1 capsule (50,000 Units total) by mouth once a week   • estradiol (VIVELLE-DOT) 0 1 MG/24HR Place 1 patch on the skin 2 (two) times a week   • famotidine (PEPCID) 20 mg tablet Take 20 mg by mouth in the morning   • losartan (COZAAR) 50 mg tablet Take 1 tablet (50 mg total) by mouth daily   • meloxicam (Mobic) 7 5 mg tablet Take 2 tablets (15 mg total) by mouth daily Para dolor del pie   • metFORMIN (GLUCOPHAGE) 1000 MG tablet Take 1 tablet (1,000 mg total) by mouth 2 (two) times a day with meals   • OneTouch Delica Lancets 47Z MISC Check blood sugars once daily  Please substitute with appropriate alternative as covered by patient's insurance  Dx: E11 65   • rosuvastatin (CRESTOR) 20 MG tablet Take 1 tablet (20 mg total) by mouth daily   • [DISCONTINUED] fenofibrate 160 MG tablet TAKE 1 TABLET EVERY DAY BY ORAL ROUTE FOR 90 DAYS  • [DISCONTINUED] glucose blood (OneTouch Verio) test strip Check blood sugars once daily  Please substitute with appropriate alternative as covered by patient's insurance   Dx: E11 65   • [DISCONTINUED] sitaGLIPtin (JANUVIA) 100 mg tablet Take 1 tablet (100 mg total) by mouth daily Maryann rica pastilla cada jose para la diabetes       Objective     /78 (BP Location: Left arm, Patient Position: Sitting, Cuff Size: Standard)   Pulse 69   Temp 97 5 °F (36 4 °C) (Temporal)   Resp 20   Ht 5' 9" (1 753 m)   Wt 90 kg (198 lb 6 4 oz)   SpO2 98%   BMI 29 30 kg/m²     Physical Exam  Vitals and nursing note reviewed  Constitutional:       Appearance: Normal appearance  HENT:      Head: Normocephalic and atraumatic  Right Ear: Tympanic membrane, ear canal and external ear normal       Left Ear: Tympanic membrane, ear canal and external ear normal    Neck:     Cardiovascular:      Rate and Rhythm: Normal rate and regular rhythm  Pulses: Normal pulses  Heart sounds: Normal heart sounds  Pulmonary:      Effort: Pulmonary effort is normal       Breath sounds: Normal breath sounds  Musculoskeletal:      Cervical back: Pain with movement and muscular tenderness present  No spinous process tenderness  Neurological:      Mental Status: She is alert and oriented to person, place, and time  Mental status is at baseline  Coordination: Romberg sign negative   Finger-Nose-Finger Test normal       Comments: Positive [place] Hallpike maneuver on right with nystagmus       Kamilla Burgos PA-C

## 2023-02-28 NOTE — ASSESSMENT & PLAN NOTE
Posterior neck pain triggering headaches infrequently  [place]lof[place]  Will refill short supply as needed  Discussed risk of NSAID overuse  Do not combine with meloxicam or ibuprofen  Start exercises and stretching

## 2023-02-28 NOTE — ASSESSMENT & PLAN NOTE
Failed multiple medications including gabapentin, pregabalin  On Cymbalta 30 mg twice daily  Ordered EMG last visit but did not complete, recommend completed establish diagnosis  With intermittent headaches  Start diclofenac as needed only and do not mix with other NSAIDs

## 2023-02-28 NOTE — ASSESSMENT & PLAN NOTE
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

## 2023-02-28 NOTE — ASSESSMENT & PLAN NOTE
Lab Results   Component Value Date    [address] 02/03/2023     Still with significantly elevated triglycerides although improved from previous 800s  Switch to rosuvastatin 20 mg once daily

## 2023-03-22 DIAGNOSIS — E55.9 VITAMIN D DEFICIENCY: ICD-10-CM

## 2023-03-22 DIAGNOSIS — E11.9 TYPE 2 DIABETES MELLITUS WITHOUT COMPLICATION, WITHOUT LONG-TERM CURRENT USE OF INSULIN (HCC): ICD-10-CM

## 2023-03-22 DIAGNOSIS — E11.9 TYPE 2 DIABETES MELLITUS WITHOUT COMPLICATION, WITHOUT LONG-TERM CURRENT USE OF INSULIN (HCC): Primary | ICD-10-CM

## 2023-03-22 RX ORDER — LANCETS 33 GAUGE
EACH MISCELLANEOUS
Qty: 100 EACH | Refills: 3 | Status: SHIPPED | OUTPATIENT
Start: 2023-03-22

## 2023-03-22 RX ORDER — LOSARTAN POTASSIUM 50 MG/1
50 TABLET ORAL DAILY
Qty: 90 TABLET | Refills: 1 | Status: SHIPPED | OUTPATIENT
Start: 2023-03-22

## 2023-03-22 RX ORDER — ISOPROPYL ALCOHOL 0.7 ML/ML
SWAB TOPICAL DAILY
Qty: 100 EACH | Refills: 3 | Status: SHIPPED | OUTPATIENT
Start: 2023-03-22

## 2023-03-22 RX ORDER — ERGOCALCIFEROL 1.25 MG/1
50000 CAPSULE ORAL WEEKLY
Qty: 12 CAPSULE | Refills: 1 | Status: SHIPPED | OUTPATIENT
Start: 2023-03-22

## 2023-03-22 RX ORDER — FENOFIBRATE 160 MG/1
1 TABLET ORAL DAILY
COMMUNITY
Start: 2023-03-02 | End: 2023-08-22

## 2023-03-22 NOTE — TELEPHONE ENCOUNTER
Patient also stated that she would like a refill on the next medication Vitamin D2 and losartan please advised thank you

## 2023-03-22 NOTE — TELEPHONE ENCOUNTER
Patient came to the office stated that she would like the sugar glucose monitoring device she been not able to check her sugar level at home for the two days please advised thank you

## 2023-05-08 ENCOUNTER — HOSPITAL ENCOUNTER (INPATIENT)
Facility: HOSPITAL | Age: 48
LOS: 2 days | Discharge: HOME | End: 2023-05-10
Attending: EMERGENCY MEDICINE | Admitting: INTERNAL MEDICINE

## 2023-05-08 ENCOUNTER — APPOINTMENT (EMERGENCY)
Dept: RADIOLOGY | Facility: HOSPITAL | Age: 48
End: 2023-05-08

## 2023-05-08 DIAGNOSIS — I21.4 NSTEMI (NON-ST ELEVATED MYOCARDIAL INFARCTION) (HCC): Primary | ICD-10-CM

## 2023-05-08 DIAGNOSIS — E78.1 HYPERTRIGLYCERIDEMIA: ICD-10-CM

## 2023-05-08 DIAGNOSIS — Z98.61 S/P PTCA (PERCUTANEOUS TRANSLUMINAL CORONARY ANGIOPLASTY): ICD-10-CM

## 2023-05-08 DIAGNOSIS — E11.9 TYPE 2 DIABETES MELLITUS WITHOUT COMPLICATION, WITHOUT LONG-TERM CURRENT USE OF INSULIN (HCC): ICD-10-CM

## 2023-05-08 PROBLEM — I10 PRIMARY HYPERTENSION: Status: ACTIVE | Noted: 2023-05-08

## 2023-05-08 PROBLEM — R51.9 HEADACHE: Status: ACTIVE | Noted: 2023-05-08

## 2023-05-08 LAB
2HR DELTA HS TROPONIN: 80 NG/L
4HR DELTA HS TROPONIN: 184 NG/L
ANION GAP SERPL CALCULATED.3IONS-SCNC: 10 MMOL/L (ref 4–13)
APTT PPP: 29 SECONDS (ref 23–37)
ATRIAL RATE: 72 BPM
ATRIAL RATE: 73 BPM
ATRIAL RATE: 81 BPM
BASOPHILS # BLD AUTO: 0.04 THOUSANDS/ÂΜL (ref 0–0.1)
BASOPHILS NFR BLD AUTO: 1 % (ref 0–1)
BUN SERPL-MCNC: 16 MG/DL (ref 5–25)
CALCIUM SERPL-MCNC: 9.6 MG/DL (ref 8.4–10.2)
CARDIAC TROPONIN I PNL SERPL HS: 109 NG/L
CARDIAC TROPONIN I PNL SERPL HS: 213 NG/L
CARDIAC TROPONIN I PNL SERPL HS: 29 NG/L
CHLORIDE SERPL-SCNC: 104 MMOL/L (ref 96–108)
CHOLEST SERPL-MCNC: 268 MG/DL
CO2 SERPL-SCNC: 22 MMOL/L (ref 21–32)
CREAT SERPL-MCNC: 0.71 MG/DL (ref 0.6–1.3)
EOSINOPHIL # BLD AUTO: 0.16 THOUSAND/ÂΜL (ref 0–0.61)
EOSINOPHIL NFR BLD AUTO: 2 % (ref 0–6)
ERYTHROCYTE [DISTWIDTH] IN BLOOD BY AUTOMATED COUNT: 13.4 % (ref 11.6–15.1)
GFR SERPL CREATININE-BSD FRML MDRD: 101 ML/MIN/1.73SQ M
GLUCOSE SERPL-MCNC: 171 MG/DL (ref 65–140)
HCT VFR BLD AUTO: 35.7 % (ref 34.8–46.1)
HDLC SERPL-MCNC: 36 MG/DL
HGB BLD-MCNC: 12 G/DL (ref 11.5–15.4)
IMM GRANULOCYTES # BLD AUTO: 0.02 THOUSAND/UL (ref 0–0.2)
IMM GRANULOCYTES NFR BLD AUTO: 0 % (ref 0–2)
INR PPP: 0.94 (ref 0.84–1.19)
LYMPHOCYTES # BLD AUTO: 3.57 THOUSANDS/ÂΜL (ref 0.6–4.47)
LYMPHOCYTES NFR BLD AUTO: 48 % (ref 14–44)
MCH RBC QN AUTO: 28.7 PG (ref 26.8–34.3)
MCHC RBC AUTO-ENTMCNC: 33.6 G/DL (ref 31.4–37.4)
MCV RBC AUTO: 85 FL (ref 82–98)
MONOCYTES # BLD AUTO: 0.48 THOUSAND/ÂΜL (ref 0.17–1.22)
MONOCYTES NFR BLD AUTO: 6 % (ref 4–12)
NEUTROPHILS # BLD AUTO: 3.25 THOUSANDS/ÂΜL (ref 1.85–7.62)
NEUTS SEG NFR BLD AUTO: 43 % (ref 43–75)
NRBC BLD AUTO-RTO: 0 /100 WBCS
P AXIS: 31 DEGREES
P AXIS: 31 DEGREES
P AXIS: 58 DEGREES
PLATELET # BLD AUTO: 254 THOUSANDS/UL (ref 149–390)
PMV BLD AUTO: 10.9 FL (ref 8.9–12.7)
POTASSIUM SERPL-SCNC: 3.9 MMOL/L (ref 3.5–5.3)
PR INTERVAL: 132 MS
PR INTERVAL: 134 MS
PR INTERVAL: 144 MS
PROTHROMBIN TIME: 12.5 SECONDS (ref 11.6–14.5)
QRS AXIS: 82 DEGREES
QRS AXIS: 82 DEGREES
QRS AXIS: 86 DEGREES
QRSD INTERVAL: 86 MS
QT INTERVAL: 398 MS
QT INTERVAL: 402 MS
QT INTERVAL: 418 MS
QTC INTERVAL: 442 MS
QTC INTERVAL: 457 MS
QTC INTERVAL: 462 MS
RBC # BLD AUTO: 4.18 MILLION/UL (ref 3.81–5.12)
SODIUM SERPL-SCNC: 136 MMOL/L (ref 135–147)
T WAVE AXIS: 79 DEGREES
T WAVE AXIS: 82 DEGREES
T WAVE AXIS: 89 DEGREES
TRIGL SERPL-MCNC: 1091 MG/DL
VENTRICULAR RATE: 72 BPM
VENTRICULAR RATE: 73 BPM
VENTRICULAR RATE: 81 BPM
WBC # BLD AUTO: 7.52 THOUSAND/UL (ref 4.31–10.16)

## 2023-05-08 RX ORDER — METOPROLOL TARTRATE 5 MG/5ML
5 INJECTION INTRAVENOUS ONCE
Status: COMPLETED | OUTPATIENT
Start: 2023-05-08 | End: 2023-05-08

## 2023-05-08 RX ORDER — NITROGLYCERIN 0.4 MG/1
0.4 TABLET SUBLINGUAL
Status: DISCONTINUED | OUTPATIENT
Start: 2023-05-08 | End: 2023-05-10 | Stop reason: HOSPADM

## 2023-05-08 RX ORDER — HEPARIN SODIUM 10000 [USP'U]/100ML
3-20 INJECTION, SOLUTION INTRAVENOUS
Status: DISCONTINUED | OUTPATIENT
Start: 2023-05-08 | End: 2023-05-09

## 2023-05-08 RX ORDER — ACETAMINOPHEN 325 MG/1
975 TABLET ORAL ONCE
Status: COMPLETED | OUTPATIENT
Start: 2023-05-08 | End: 2023-05-08

## 2023-05-08 RX ORDER — HEPARIN SODIUM 1000 [USP'U]/ML
4000 INJECTION, SOLUTION INTRAVENOUS; SUBCUTANEOUS EVERY 6 HOURS PRN
Status: DISCONTINUED | OUTPATIENT
Start: 2023-05-08 | End: 2023-05-09

## 2023-05-08 RX ORDER — HEPARIN SODIUM 1000 [USP'U]/ML
4000 INJECTION, SOLUTION INTRAVENOUS; SUBCUTANEOUS ONCE
Status: COMPLETED | OUTPATIENT
Start: 2023-05-08 | End: 2023-05-08

## 2023-05-08 RX ORDER — HEPARIN SODIUM 1000 [USP'U]/ML
2000 INJECTION, SOLUTION INTRAVENOUS; SUBCUTANEOUS EVERY 6 HOURS PRN
Status: DISCONTINUED | OUTPATIENT
Start: 2023-05-08 | End: 2023-05-09

## 2023-05-08 RX ADMIN — NITROGLYCERIN 0.4 MG: 0.4 TABLET SUBLINGUAL at 20:07

## 2023-05-08 RX ADMIN — METOROPROLOL TARTRATE 5 MG: 5 INJECTION, SOLUTION INTRAVENOUS at 21:24

## 2023-05-08 RX ADMIN — ACETAMINOPHEN 325MG 975 MG: 325 TABLET ORAL at 22:28

## 2023-05-08 RX ADMIN — HEPARIN SODIUM 11.1 UNITS/KG/HR: 10000 INJECTION, SOLUTION INTRAVENOUS at 20:56

## 2023-05-08 RX ADMIN — MORPHINE SULFATE 2 MG: 2 INJECTION, SOLUTION INTRAMUSCULAR; INTRAVENOUS at 20:37

## 2023-05-08 RX ADMIN — HEPARIN SODIUM 4000 UNITS: 1000 INJECTION INTRAVENOUS; SUBCUTANEOUS at 20:53

## 2023-05-08 RX ADMIN — NITROGLYCERIN 1 INCH: 20 OINTMENT TOPICAL at 22:02

## 2023-05-08 RX ADMIN — SODIUM CHLORIDE 1000 ML: 0.9 INJECTION, SOLUTION INTRAVENOUS at 20:53

## 2023-05-08 NOTE — ED PROVIDER NOTES
"History  Chief Complaint   Patient presents with   • Chest Pain     Pt brought in via EMS for complaints of chest pain and sob  Pt reports walking to school in a hurry and feeling a burning in her chest followed by sob and feeling dizzy and hot  Pt has diabetes and blood sugar 180 for EMS  Per  of aspirin given and 1 of nitro  52y F here for evaluation of chest pain  Was walking w/ daughter to the school [employer] so they could study when she started w/ diffuse chest pain - felt tight and burning  This started around  during the walk  Pt reports feeling short of breath, very sweaty, lightheaded  Denies nausea or palpitations  Symptoms weren't improving - pt is unsure who called the ambulance once they were at [employer]  Pt reports she was given \"some pills\" by the ambulance and the pain has now improved down to 1-2/10 from 10/10, but pt states she's starting to feel sweaty again  Pt denies recent illness  Does reports exertional chest pain similar to this in the past, but never this bad  Strong FH of early CAD on father's side - father, his sister and mother all  of heart attacks (father was 46)  Pt reports hx of HTN, DM, compliant w/ meds  Denies hx of HLP but is on a statin  No hx of previous stress test or cath  Denies f/c/s, no cough/congestion, no known PUD, no LE pain or swelling  History provided by:  Patient   used: No        Prior to Admission Medications   Prescriptions Last Dose Informant Patient Reported? Taking? Alcohol Swabs (Pharmacist Choice Alcohol) PADS 2023  No Yes   Sig: Use in the morning   Blood Glucose Monitoring Suppl (OneTouch Verio Reflect) w/Device KIT 2023  No Yes   Sig: Check blood sugars once daily  Please substitute with appropriate alternative as covered by patient's insurance   Dx: E11 65   DULoxetine (CYMBALTA) 30 mg delayed release capsule Unknown  Yes No   Sig: TAKE 1 CAPSULE TWICE A DAY BY ORAL ROUTE FOR 30 " DAYS    OneTouch Delica Lancets 24I MISC 2023  No Yes   Sig: Check blood sugars once daily  Please substitute with appropriate alternative as covered by patient's insurance  Dx: E11 65   dulaglutide (Trulicity) 0 77 CW/0 9KG injection Unknown  No No   Sig: Inject 0 5 mL (0 75 mg total) under the skin every 7 days   ergocalciferol (VITAMIN D2) 50,000 units Unknown  No No   Sig: Take 1 capsule (50,000 Units total) by mouth once a week   estradiol (VIVELLE-DOT) 0 1 MG/24HR Unknown  No No   Sig: Place 1 patch on the skin 2 (two) times a week   famotidine (PEPCID) 20 mg tablet Not Taking  Yes No   Sig: Take 20 mg by mouth in the morning   Patient not taking: Reported on 2023   fenofibrate 160 MG tablet 2023  Yes Yes   Sig: Take 1 tablet by mouth in the morning   glucose blood test strip 2023  No Yes   Sig: Use as instructed   losartan (COZAAR) 50 mg tablet 2023  No Yes   Sig: Take 1 tablet (50 mg total) by mouth daily   meclizine (ANTIVERT) 12 5 MG tablet Not Taking  No No   Sig: Take 1 tablet (12 5 mg total) by mouth 3 (three) times a day as needed for dizziness si necesita para los mareos   Patient not taking: Reported on 2023   meloxicam (MOBIC) 15 mg tablet Not Taking  No No   Sig: Take 1 tablet (15 mg total) by mouth daily as needed for moderate pain   Patient not taking: Reported on 2023   metFORMIN (GLUCOPHAGE) 1000 MG tablet 2023  No Yes   Sig: Take 1 tablet (1,000 mg total) by mouth 2 (two) times a day with meals   rosuvastatin (CRESTOR) 20 MG tablet 2023  No Yes   Sig: Take 1 tablet (20 mg total) by mouth daily      Facility-Administered Medications: None       Past Medical History:   Diagnosis Date   • Diabetes mellitus ([facility] [address] )        Past Surgical History:   Procedure Laterality Date   •  SECTION       and    • HYSTERECTOMY W/ SALPINGO-OOPHERECTOMY Bilateral     in [address]       History reviewed  No pertinent family history    I have reviewed and agree with the history as documented  E-Cigarette/Vaping   • E-Cigarette Use Never User      E-Cigarette/Vaping Substances     Social History     Tobacco Use   • Smoking status: Never   • Smokeless tobacco: Never   Vaping Use   • Vaping Use: Never used   Substance Use Topics   • Alcohol use: Never   • Drug use: Never       Review of Systems   All other systems reviewed and are negative  Physical Exam  Physical Exam  Vitals and nursing note reviewed  Constitutional:       General: She is not in acute distress  Appearance: She is obese  She is diaphoretic  HENT:      Nose: Nose normal    Eyes:      Conjunctiva/sclera: Conjunctivae normal    Cardiovascular:      Rate and Rhythm: Normal rate and regular rhythm  Heart sounds: No murmur heard  Pulmonary:      Effort: Pulmonary effort is normal       Breath sounds: Normal breath sounds  Abdominal:      Palpations: Abdomen is soft  Musculoskeletal:         General: No swelling or tenderness  Cervical back: Normal range of motion  Skin:     General: Skin is warm  Neurological:      General: No focal deficit present  Mental Status: She is alert and oriented to person, place, and time     Psychiatric:         Mood and Affect: Mood normal          Vital Signs  ED Triage Vitals   Temperature Pulse Respirations Blood Pressure SpO2   05/08/23 1913 05/08/23 1913 05/08/23 1913 05/08/23 1913 05/08/23 1913   98 4 °F (36 9 °C) 82 20 (!) 167/103 98 %      Temp Source Heart Rate Source Patient Position - Orthostatic VS BP Location FiO2 (%)   05/08/23 1913 05/08/23 1913 05/08/23 1913 05/08/23 1913 --   Oral Monitor Lying Right arm       Pain Score       05/08/23 2037       6           Vitals:    05/08/23 2100 05/08/23 2126 05/08/23 2230 05/08/23 2300   BP: 158/92 157/92 154/97 153/97   Pulse: 73 69 67 65   Patient Position - Orthostatic VS: Lying Lying Lying Lying         Visual Acuity      ED Medications  Medications   nitroglycerin (NITROSTAT) SL tablet 0 4 mg (0 4 mg Sublingual Given 5/8/23 2007)   heparin (porcine) 25,000 units in 0 45% NaCl 250 mL infusion (premix) (11 1 Units/kg/hr × 90 kg (Order-Specific) Intravenous New Bag 5/8/23 2056)   heparin (porcine) injection 4,000 Units (has no administration in time range)   heparin (porcine) injection 2,000 Units (has no administration in time range)   morphine injection 2 mg (2 mg Intravenous Given 5/8/23 2037)   sodium chloride 0 9 % bolus 1,000 mL (0 mL Intravenous Stopped 5/8/23 2323)   heparin (porcine) injection 4,000 Units (4,000 Units Intravenous Given 5/8/23 2053)   metoprolol (LOPRESSOR) injection 5 mg (5 mg Intravenous Given 5/8/23 2124)   nitroglycerin (NITRO-BID) 2 % TD ointment 1 inch (1 inch Topical Given 5/8/23 2202)   acetaminophen (TYLENOL) tablet 975 mg (975 mg Oral Given 5/8/23 2228)       Diagnostic Studies  Results Reviewed     Procedure Component Value Units Date/Time    HS Troponin I 4hr [[id number]] Collected: 05/08/23 2326    Lab Status:  In process Specimen: Blood from Arm, Right Updated: 05/08/23 2332    Lipid Panel with Direct LDL reflex [[id number]]     Lab Status: No result Specimen: Blood     Hemoglobin A1C w/ EAG Estimation [[id number]]     Lab Status: No result Specimen: Blood     HS Troponin I 2hr [[id number]]  (Abnormal) Collected: 05/08/23 2108    Lab Status: Final result Specimen: Blood from Arm, Right Updated: 05/08/23 2137     hs TnI 2hr 109 ng/L      Delta 2hr hsTnI 80 ng/L     APTT six (6) hours after Heparin bolus/drip initiation or dosing change [[id number]]  (Normal) Collected: 05/08/23 2036    Lab Status: Final result Specimen: Blood from Arm, Left Updated: 05/08/23 2101     PTT 29 seconds     Protime-INR [[id number]]  (Normal) Collected: 05/08/23 2036    Lab Status: Final result Specimen: Blood from Arm, Left Updated: 05/08/23 2101     Protime 12 5 seconds      INR 0 94    HS Troponin 0hr (reflex protocol) [[id number]]  (Normal) Collected: 05/08/23 1933    Lab Status: Final result Specimen: Blood from Arm, Left Updated: 05/08/23 2007     hs TnI 0hr 29 ng/L     Basic metabolic panel [[id number]]  (Abnormal) Collected: 05/08/23 1933    Lab Status: Final result Specimen: Blood from Arm, Left Updated: 05/08/23 2000     Sodium 136 mmol/L      Potassium 3 9 mmol/L      Chloride 104 mmol/L      CO2 22 mmol/L      ANION GAP 10 mmol/L      BUN 16 mg/dL      Creatinine 0 71 mg/dL      Glucose 171 mg/dL      Calcium 9 6 mg/dL      eGFR 101 ml/min/1 73sq m     Narrative:      [facility] guidelines for Chronic Kidney Disease (CKD):   •  Stage 1 with normal or high GFR (GFR > 90 mL/min/1 73 square meters)  •  Stage 2 Mild CKD (GFR = 60-89 mL/min/1 73 square meters)  •  Stage 3A Moderate CKD (GFR = 45-59 mL/min/1 73 square meters)  •  Stage 3B Moderate CKD (GFR = 30-44 mL/min/1 73 square meters)  •  Stage 4 Severe CKD (GFR = 15-29 mL/min/1 73 square meters)  •  Stage 5 End Stage CKD (GFR <15 mL/min/1 73 square meters)  Note: GFR calculation is accurate only with a steady state creatinine    CBC and differential [[id number]]  (Abnormal) Collected: 05/08/23 1933    Lab Status: Final result Specimen: Blood from Arm, Left Updated: 05/08/23 1939     WBC 7 52 Thousand/uL      RBC 4 18 Million/uL      Hemoglobin 12 0 g/dL      Hematocrit 35 7 %      MCV 85 fL      MCH 28 7 pg      MCHC 33 6 g/dL      RDW 13 4 %      MPV 10 9 fL      Platelets 110 Thousands/uL      nRBC 0 /100 WBCs      Neutrophils Relative 43 %      Immat GRANS % 0 %      Lymphocytes Relative 48 %      Monocytes Relative 6 %      Eosinophils Relative 2 %      Basophils Relative 1 %      Neutrophils Absolute 3 25 Thousands/µL      Immature Grans Absolute 0 02 Thousand/uL      Lymphocytes Absolute 3 57 Thousands/µL      Monocytes Absolute 0 48 Thousand/µL      Eosinophils Absolute 0 16 Thousand/µL      Basophils Absolute 0 04 Thousands/µL                  XR chest 1 view portable   ED Interpretation by [facility],  (05/08 2015)   Xray reviewed and independently interpreted by me: no acute findings                   Procedures  ECG 12 Lead Documentation Only    Date/Time: 5/8/2023 7:20 PM  Performed by: Lisbeth Forman DO  Authorized by: Lisbeth Forman DO     Indications / Diagnosis:  Pain  ECG reviewed by me, the ED Provider: yes    Patient location:  ED  Previous ECG:     Previous ECG:  Unavailable  Interpretation:     Interpretation: abnormal    Rate:     ECG rate:  81    ECG rate assessment: normal    Rhythm:     Rhythm: sinus rhythm    Ectopy:     Ectopy: none    QRS:     QRS axis:  Normal  ST segments:     ST segments:  Normal  T waves:     T waves: inverted      Inverted:  AVR, aVL and V1 (biphasic V2)    ECG 12 Lead Documentation Only    Date/Time: 5/8/2023 9:05 PM  Performed by: Lisbeth Forman DO  Authorized by: Lisbeth Forman DO     Indications / Diagnosis:  0/10 pain  ECG reviewed by me, the ED Provider: yes    Patient location:  ED  Previous ECG:     Previous ECG:  Compared to current    Similarity:  Changes noted  Interpretation:     Interpretation: abnormal    Rate:     ECG rate:  72    ECG rate assessment: normal    Rhythm:     Rhythm: sinus rhythm    Ectopy:     Ectopy: none    QRS:     QRS axis:  Normal  T waves:     T waves: flattening and inverted      T waves comment:  Biphasic V4    Flattening:   I    Inverted:  V1, V2, V3, aVR and aVL  ECG 12 Lead Documentation Only    Date/Time: 5/8/2023 8:27 PM  Performed by: Lisbeth Forman DO  Authorized by: Lisbeth Forman DO     Indications / Diagnosis:  1/10 pain  ECG reviewed by me, the ED Provider: yes    Patient location:  ED  Previous ECG:     Previous ECG:  Compared to current    Similarity:  Changes noted  Interpretation:     Interpretation: abnormal    Rate:     ECG rate:  73    ECG rate assessment: normal    Rhythm:     Rhythm: sinus rhythm    Ectopy:     Ectopy: none    QRS:     QRS axis:  Normal  ST segments:     ST segments:  Normal  T "waves:     T waves: inverted      Inverted:  AVR, aVL and V1 (biphasic V2, V3)  CriticalCare Time    Date/Time: 5/8/2023 10:03 PM  Performed by: Ira Moran DO  Authorized by: Ira Moran DO     Critical care provider statement:     Critical care time (minutes):  60    Critical care time was exclusive of:  Separately billable procedures and treating other patients and teaching time    Critical care was necessary to treat or prevent imminent or life-threatening deterioration of the following conditions:  Cardiac failure    Critical care was time spent personally by me on the following activities:  Blood draw for specimens, obtaining history from patient or surrogate, development of treatment plan with patient or surrogate, discussions with consultants, evaluation of patient's response to treatment, examination of patient, ordering and performing treatments and interventions, ordering and review of laboratory studies, ordering and review of radiographic studies, re-evaluation of patient's condition and review of old charts    I assumed direction of critical care for this patient from another provider in my specialty: no               ED Course  ED Course as of 05/08/23 2333   Mon May 08, 2023   1925 Initial EKG NSR w/ inverted T in V1 and biphasic appearing T in V2  No old EKG to compare   [id number] 2015 hs TnI 0hr: 29  Given concerning hx and no previous cardiac eval, will d/w cardiology, but will likely need to stay for cardiac eval    Delta due at 2133 2028 EKG repeated after SL Ntg and improved BP  More pronounced biphasic changes in V2 and evolving changes in V3  TT sent to cards to discuss   2048 Discussed EKGs w/ Dr Noemi Dewitt  Treat as ACS/NSTEMI at this time  \"Indications for urgent cath would be, progression to STEMI, intractable CP, recurrent ventricular arrhythmia or cardiogenic shock overnight\"  2049 Would still recommend \"lipitor 80, aspirin, heparin and beta blocker\"      Pt had " received ASA via EMS, along w/ her initial NTG w/ one additional NTG here  Heparin is ordered  Will await next EKG/delta and contact [facility] for admission   2053 Given progression of last EKG and cards recommendation will get repeat EKG and trop now (was due at 2133)   2105 Pt updated  0/10 pain at this time  Repeat EKG w/ some continued progression of changes, now into V4  Trop being drawn now  SBIRT 20yo+    Flowsheet Row Most Recent Value   Initial Alcohol Screen: US AUDIT-C     1  How often do you have a drink containing alcohol? 0 Filed at: 05/08/2023 1916   2  How many drinks containing alcohol do you have on a typical day you are drinking? 0 Filed at: 05/08/2023 1916   3b  FEMALE Any Age, or MALE 65+: How often do you have 4 or more drinks on one occassion? 0 Filed at: 05/08/2023 1916   Audit-C Score 0 Filed at: 05/08/2023 1916   JANELLE: How many times in the past year have you    Used an illegal drug or used a prescription medication for non-medical reasons?  Never Filed at: 05/08/2023 1916        BEATRIZ Risk Score    Flowsheet Row Most Recent Value   Age >= 72 0 Filed at: 05/08/2023 2220   Known CAD (stenosis >= 50%) 0 Filed at: 05/08/2023 2220   Recent (<=24 hrs) Service Angina 1 Filed at: 05/08/2023 2220   ST Deviation >= 0 5 mm 0 Filed at: 05/08/2023 2220   3+ CAD Risk Factors (FHx, HTN, HLP, DM, Smoker) 1 Filed at: 05/08/2023 2220   Aspirin Use Past 7 Days 0 Filed at: 05/08/2023 2220   Elevated Cardiac Markers 1 Filed at: 05/08/2023 2220   BEATRIZ Risk Score (Calculated) 3 Filed at: 05/08/2023 2220                  MDM    Disposition  Final diagnoses:   NSTEMI (non-ST elevated myocardial infarction) [facility])     Time reflects when diagnosis was documented in both MDM as applicable and the Disposition within this note     Time User Action Codes Description Comment    5/8/2023  9:51 PM Alize Andre Add [I21 4] NSTEMI (non-ST elevated myocardial infarction) [facility])       ED Disposition     ED Disposition   Admit    Condition   Stable    Date/Time   Mon May 8, 2023  9:51 PM    Comment   Case was discussed with  and the patient's admission status was agreed to be  to the service of Dr Linda Garner    None         Patient's Medications   Discharge Prescriptions    No medications on file       No discharge procedures on file      PDMP Review     None          ED Provider  Electronically Signed by           Selvin Malcolm DO  05/08/23 1653

## 2023-05-08 NOTE — LETTER
[address] [id number]  Dept: [phone]    May 10, 2023     Patient: Misael Rosenthal   YOB: 1975   Date of Visit: 5/8/2023       To Whom it May Concern:    Misael Rosenthal is under my professional care  She was seen in the hospital from 5/8/2023 to 05/10/23  She cannot return to work until she is cleared by her cardiologist to do so  If you have any questions or concerns, please don't hesitate to call           Sincerely,          Mike Silvestre MD

## 2023-05-08 NOTE — LETTER
[address] [id number]  Dept: [phone]    May 10, 2023     Patient: Ashley Barnes   YOB: 1975   Date of Visit: 5/8/2023       To Whom it May Concern:    Ashley Barnes is under my professional care  She was seen in the hospital from 5/8/2023 to 05/10/23  She May not return to work until she is cleared by her cardiologist   If you have any questions or concerns, please don't hesitate to call           Sincerely,          Uriel Rich MD

## 2023-05-09 ENCOUNTER — APPOINTMENT (INPATIENT)
Dept: NON INVASIVE DIAGNOSTICS | Facility: HOSPITAL | Age: 48
End: 2023-05-09

## 2023-05-09 LAB
ANION GAP SERPL CALCULATED.3IONS-SCNC: 8 MMOL/L (ref 4–13)
AORTIC ROOT: 3.4 CM
AORTIC VALVE MEAN VELOCITY: 9.5 M/S
APICAL FOUR CHAMBER EJECTION FRACTION: 56 %
APTT PPP: 49 SECONDS (ref 23–37)
APTT PPP: 52 SECONDS (ref 23–37)
ASCENDING AORTA: 3.3 CM
ATRIAL RATE: 67 BPM
AV AREA BY CONTINUOUS VTI: 2.6 CM2
AV AREA PEAK VELOCITY: 2.5 CM2
AV LVOT MEAN GRADIENT: 2 MMHG
AV LVOT PEAK GRADIENT: 4 MMHG
AV MEAN GRADIENT: 4 MMHG
AV PEAK GRADIENT: 7 MMHG
AV VALVE AREA: 2.58 CM2
AV VELOCITY RATIO: 0.72
BUN SERPL-MCNC: 9 MG/DL (ref 5–25)
CALCIUM SERPL-MCNC: 8.4 MG/DL (ref 8.4–10.2)
CARDIAC TROPONIN I PNL SERPL HS: 128 NG/L (ref 8–18)
CHLORIDE SERPL-SCNC: 107 MMOL/L (ref 96–108)
CO2 SERPL-SCNC: 23 MMOL/L (ref 21–32)
CREAT SERPL-MCNC: 0.5 MG/DL (ref 0.6–1.3)
DOP CALC AO PEAK VEL: 1.33 M/S
DOP CALC AO VTI: 30.15 CM
DOP CALC LVOT AREA: 3.46 CM2
DOP CALC LVOT DIAMETER: 2.1 CM
DOP CALC LVOT PEAK VEL VTI: 22.48 CM
DOP CALC LVOT PEAK VEL: 0.96 M/S
DOP CALC LVOT STROKE INDEX: 35.5 ML/M2
DOP CALC LVOT STROKE VOLUME: 77.82
E WAVE DECELERATION TIME: 177 MS
ERYTHROCYTE [DISTWIDTH] IN BLOOD BY AUTOMATED COUNT: 13.5 % (ref 11.6–15.1)
EST. AVERAGE GLUCOSE BLD GHB EST-MCNC: 183 MG/DL
FRACTIONAL SHORTENING: 37 (ref 28–44)
GFR SERPL CREATININE-BSD FRML MDRD: 115 ML/MIN/1.73SQ M
GLUCOSE SERPL-MCNC: 135 MG/DL (ref 65–140)
GLUCOSE SERPL-MCNC: 137 MG/DL (ref 65–140)
GLUCOSE SERPL-MCNC: 148 MG/DL (ref 65–140)
GLUCOSE SERPL-MCNC: 150 MG/DL (ref 65–140)
GLUCOSE SERPL-MCNC: 151 MG/DL (ref 65–140)
GLUCOSE SERPL-MCNC: 177 MG/DL (ref 65–140)
HBA1C MFR BLD: 8 %
HCT VFR BLD AUTO: 33.7 % (ref 34.8–46.1)
HGB BLD-MCNC: 11.2 G/DL (ref 11.5–15.4)
INTERVENTRICULAR SEPTUM IN DIASTOLE (PARASTERNAL SHORT AXIS VIEW): 1.3 CM
INTERVENTRICULAR SEPTUM: 1.3 CM (ref 0.6–1.1)
KCT BLD-ACNC: 246 SEC (ref 89–137)
KCT BLD-ACNC: 348 SEC (ref 89–137)
LAAS-AP2: 16.8 CM2
LAAS-AP4: 18.8 CM2
LDLC SERPL DIRECT ASSAY-MCNC: 111 MG/DL (ref 0–100)
LEFT ATRIUM SIZE: 3.4 CM
LEFT INTERNAL DIMENSION IN SYSTOLE: 2.9 CM (ref 2.1–4)
LEFT VENTRICLE DIASTOLIC VOLUME (MOD BIPLANE): 103 ML
LEFT VENTRICLE SYSTOLIC VOLUME (MOD BIPLANE): 50 ML
LEFT VENTRICULAR INTERNAL DIMENSION IN DIASTOLE: 4.6 CM (ref 3.5–6)
LEFT VENTRICULAR POSTERIOR WALL IN END DIASTOLE: 1.1 CM
LEFT VENTRICULAR STROKE VOLUME: 62 ML
LV EF: 51 %
LVSV (TEICH): 62 ML
MAGNESIUM SERPL-MCNC: 1.9 MG/DL (ref 1.9–2.7)
MCH RBC QN AUTO: 29.2 PG (ref 26.8–34.3)
MCHC RBC AUTO-ENTMCNC: 33.2 G/DL (ref 31.4–37.4)
MCV RBC AUTO: 88 FL (ref 82–98)
MV E'TISSUE VEL-LAT: 9 CM/S
MV E'TISSUE VEL-SEP: 7 CM/S
MV PEAK A VEL: 0.67 M/S
MV PEAK E VEL: 58 CM/S
P AXIS: 34 DEGREES
PLATELET # BLD AUTO: 232 THOUSANDS/UL (ref 149–390)
PMV BLD AUTO: 11.8 FL (ref 8.9–12.7)
POTASSIUM SERPL-SCNC: 3.5 MMOL/L (ref 3.5–5.3)
PR INTERVAL: 142 MS
QRS AXIS: 79 DEGREES
QRSD INTERVAL: 82 MS
QT INTERVAL: 450 MS
QTC INTERVAL: 475 MS
RBC # BLD AUTO: 3.84 MILLION/UL (ref 3.81–5.12)
RIGHT ATRIAL 2D VOLUME: 36 ML
RIGHT ATRIUM AREA SYSTOLE A4C: 14.4 CM2
RIGHT VENTRICLE ID DIMENSION: 3 CM
SL CV LEFT ATRIUM LENGTH A2C: 5 CM
SL CV LV EF: 54
SL CV PED ECHO LEFT VENTRICLE DIASTOLIC VOLUME (MOD BIPLANE) 2D: 95 ML
SL CV PED ECHO LEFT VENTRICLE SYSTOLIC VOLUME (MOD BIPLANE) 2D: 33 ML
SODIUM SERPL-SCNC: 138 MMOL/L (ref 135–147)
SPECIMEN SOURCE: ABNORMAL
SPECIMEN SOURCE: ABNORMAL
T WAVE AXIS: 80 DEGREES
TR MAX PG: 21 MMHG
TR PEAK VELOCITY: 2.3 M/S
TRICUSPID ANNULAR PLANE SYSTOLIC EXCURSION: 1.6 CM
TRICUSPID VALVE PEAK REGURGITATION VELOCITY: 2.3 M/S
VENTRICULAR RATE: 67 BPM
WBC # BLD AUTO: 5.7 THOUSAND/UL (ref 4.31–10.16)

## 2023-05-09 PROCEDURE — 4A023N7 MEASUREMENT OF CARDIAC SAMPLING AND PRESSURE, LEFT HEART, PERCUTANEOUS APPROACH: ICD-10-PCS | Performed by: INTERNAL MEDICINE

## 2023-05-09 PROCEDURE — 027034Z DILATION OF CORONARY ARTERY, ONE ARTERY WITH DRUG-ELUTING INTRALUMINAL DEVICE, PERCUTANEOUS APPROACH: ICD-10-PCS | Performed by: INTERNAL MEDICINE

## 2023-05-09 PROCEDURE — B210YZZ FLUOROSCOPY OF SINGLE CORONARY ARTERY USING OTHER CONTRAST: ICD-10-PCS | Performed by: INTERNAL MEDICINE

## 2023-05-09 DEVICE — STENT ONYXNG35026UX ONYX 3.50X26RX
Type: IMPLANTABLE DEVICE | Status: FUNCTIONAL
Brand: ONYX FRONTIER™

## 2023-05-09 RX ORDER — INSULIN LISPRO 100 [IU]/ML
1-6 INJECTION, SOLUTION INTRAVENOUS; SUBCUTANEOUS
Status: DISCONTINUED | OUTPATIENT
Start: 2023-05-09 | End: 2023-05-10 | Stop reason: HOSPADM

## 2023-05-09 RX ORDER — ACETAMINOPHEN 325 MG/1
650 TABLET ORAL EVERY 6 HOURS PRN
Status: DISCONTINUED | OUTPATIENT
Start: 2023-05-09 | End: 2023-05-10 | Stop reason: HOSPADM

## 2023-05-09 RX ORDER — METOPROLOL SUCCINATE 25 MG/1
25 TABLET, EXTENDED RELEASE ORAL
Status: DISCONTINUED | OUTPATIENT
Start: 2023-05-09 | End: 2023-05-10 | Stop reason: HOSPADM

## 2023-05-09 RX ORDER — MIDAZOLAM HYDROCHLORIDE 2 MG/2ML
INJECTION, SOLUTION INTRAMUSCULAR; INTRAVENOUS CODE/TRAUMA/SEDATION MEDICATION
Status: DISCONTINUED | OUTPATIENT
Start: 2023-05-09 | End: 2023-05-09 | Stop reason: HOSPADM

## 2023-05-09 RX ORDER — CALCIUM CARBONATE 200(500)MG
1000 TABLET,CHEWABLE ORAL DAILY PRN
Status: DISCONTINUED | OUTPATIENT
Start: 2023-05-09 | End: 2023-05-10 | Stop reason: HOSPADM

## 2023-05-09 RX ORDER — ENOXAPARIN SODIUM 100 MG/ML
40 INJECTION SUBCUTANEOUS
Status: DISCONTINUED | OUTPATIENT
Start: 2023-05-10 | End: 2023-05-10 | Stop reason: HOSPADM

## 2023-05-09 RX ORDER — POLYETHYLENE GLYCOL 3350 17 G/17G
17 POWDER, FOR SOLUTION ORAL DAILY PRN
Status: DISCONTINUED | OUTPATIENT
Start: 2023-05-09 | End: 2023-05-10 | Stop reason: HOSPADM

## 2023-05-09 RX ORDER — VERAPAMIL HYDROCHLORIDE 2.5 MG/ML
INJECTION, SOLUTION INTRAVENOUS CODE/TRAUMA/SEDATION MEDICATION
Status: DISCONTINUED | OUTPATIENT
Start: 2023-05-09 | End: 2023-05-09 | Stop reason: HOSPADM

## 2023-05-09 RX ORDER — FENOFIBRATE 145 MG/1
160 TABLET, COATED ORAL DAILY
Status: DISCONTINUED | OUTPATIENT
Start: 2023-05-09 | End: 2023-05-10 | Stop reason: HOSPADM

## 2023-05-09 RX ORDER — ONDANSETRON 2 MG/ML
4 INJECTION INTRAMUSCULAR; INTRAVENOUS EVERY 6 HOURS PRN
Status: DISCONTINUED | OUTPATIENT
Start: 2023-05-09 | End: 2023-05-10 | Stop reason: HOSPADM

## 2023-05-09 RX ORDER — METOPROLOL TARTRATE 5 MG/5ML
5 INJECTION INTRAVENOUS EVERY 6 HOURS SCHEDULED
Status: DISCONTINUED | OUTPATIENT
Start: 2023-05-09 | End: 2023-05-09

## 2023-05-09 RX ORDER — SODIUM CHLORIDE 9 MG/ML
125 INJECTION, SOLUTION INTRAVENOUS CONTINUOUS
Status: DISPENSED | OUTPATIENT
Start: 2023-05-09 | End: 2023-05-09

## 2023-05-09 RX ORDER — NITROGLYCERIN 20 MG/100ML
INJECTION INTRAVENOUS CODE/TRAUMA/SEDATION MEDICATION
Status: DISCONTINUED | OUTPATIENT
Start: 2023-05-09 | End: 2023-05-09 | Stop reason: HOSPADM

## 2023-05-09 RX ORDER — SODIUM CHLORIDE 9 MG/ML
75 INJECTION, SOLUTION INTRAVENOUS CONTINUOUS
Status: DISCONTINUED | OUTPATIENT
Start: 2023-05-09 | End: 2023-05-09

## 2023-05-09 RX ORDER — LIDOCAINE HYDROCHLORIDE 10 MG/ML
INJECTION, SOLUTION EPIDURAL; INFILTRATION; INTRACAUDAL; PERINEURAL CODE/TRAUMA/SEDATION MEDICATION
Status: DISCONTINUED | OUTPATIENT
Start: 2023-05-09 | End: 2023-05-09 | Stop reason: HOSPADM

## 2023-05-09 RX ORDER — HEPARIN SODIUM 1000 [USP'U]/ML
INJECTION, SOLUTION INTRAVENOUS; SUBCUTANEOUS CODE/TRAUMA/SEDATION MEDICATION
Status: DISCONTINUED | OUTPATIENT
Start: 2023-05-09 | End: 2023-05-09 | Stop reason: HOSPADM

## 2023-05-09 RX ORDER — FENTANYL CITRATE 50 UG/ML
INJECTION, SOLUTION INTRAMUSCULAR; INTRAVENOUS CODE/TRAUMA/SEDATION MEDICATION
Status: DISCONTINUED | OUTPATIENT
Start: 2023-05-09 | End: 2023-05-09 | Stop reason: HOSPADM

## 2023-05-09 RX ORDER — ASPIRIN 81 MG/1
81 TABLET, CHEWABLE ORAL DAILY
Status: DISCONTINUED | OUTPATIENT
Start: 2023-05-09 | End: 2023-05-10 | Stop reason: HOSPADM

## 2023-05-09 RX ORDER — LOSARTAN POTASSIUM 50 MG/1
50 TABLET ORAL DAILY
Status: DISCONTINUED | OUTPATIENT
Start: 2023-05-09 | End: 2023-05-10 | Stop reason: HOSPADM

## 2023-05-09 RX ORDER — DULOXETIN HYDROCHLORIDE 30 MG/1
30 CAPSULE, DELAYED RELEASE ORAL DAILY
Status: DISCONTINUED | OUTPATIENT
Start: 2023-05-09 | End: 2023-05-10 | Stop reason: HOSPADM

## 2023-05-09 RX ORDER — ASPIRIN 81 MG/1
243 TABLET, CHEWABLE ORAL ONCE
Status: DISCONTINUED | OUTPATIENT
Start: 2023-05-09 | End: 2023-05-09 | Stop reason: HOSPADM

## 2023-05-09 RX ORDER — ATORVASTATIN CALCIUM 80 MG/1
80 TABLET, FILM COATED ORAL EVERY EVENING
Status: DISCONTINUED | OUTPATIENT
Start: 2023-05-09 | End: 2023-05-10 | Stop reason: HOSPADM

## 2023-05-09 RX ORDER — SODIUM CHLORIDE 9 MG/ML
125 INJECTION, SOLUTION INTRAVENOUS CONTINUOUS
Status: CANCELLED | OUTPATIENT
Start: 2023-05-09

## 2023-05-09 RX ADMIN — METOPROLOL SUCCINATE 25 MG: 25 TABLET, EXTENDED RELEASE ORAL at 21:35

## 2023-05-09 RX ADMIN — FENOFIBRATE 145 MG: 145 TABLET, FILM COATED ORAL at 09:37

## 2023-05-09 RX ADMIN — CALCIUM CARBONATE (ANTACID) CHEW TAB 500 MG 1000 MG: 500 CHEW TAB at 16:06

## 2023-05-09 RX ADMIN — HEPARIN SODIUM 2000 UNITS: 1000 INJECTION INTRAVENOUS; SUBCUTANEOUS at 03:54

## 2023-05-09 RX ADMIN — TICAGRELOR 90 MG: 90 TABLET ORAL at 21:35

## 2023-05-09 RX ADMIN — NITROGLYCERIN 0.4 MG: 0.4 TABLET SUBLINGUAL at 01:45

## 2023-05-09 RX ADMIN — ONDANSETRON 4 MG: 2 INJECTION INTRAMUSCULAR; INTRAVENOUS at 16:04

## 2023-05-09 RX ADMIN — TICAGRELOR 180 MG: 90 TABLET ORAL at 09:39

## 2023-05-09 RX ADMIN — SODIUM CHLORIDE 75 ML/HR: 0.9 INJECTION, SOLUTION INTRAVENOUS at 05:58

## 2023-05-09 RX ADMIN — ASPIRIN 81 MG 81 MG: 81 TABLET ORAL at 09:39

## 2023-05-09 RX ADMIN — SODIUM CHLORIDE 125 ML/HR: 0.9 INJECTION, SOLUTION INTRAVENOUS at 13:12

## 2023-05-09 RX ADMIN — ATORVASTATIN CALCIUM 80 MG: 80 TABLET, FILM COATED ORAL at 17:05

## 2023-05-09 RX ADMIN — METOROPROLOL TARTRATE 5 MG: 5 INJECTION, SOLUTION INTRAVENOUS at 05:57

## 2023-05-09 RX ADMIN — METOROPROLOL TARTRATE 5 MG: 5 INJECTION, SOLUTION INTRAVENOUS at 01:54

## 2023-05-09 RX ADMIN — LOSARTAN POTASSIUM 50 MG: 50 TABLET, FILM COATED ORAL at 09:36

## 2023-05-09 RX ADMIN — INSULIN LISPRO 1 UNITS: 100 INJECTION, SOLUTION INTRAVENOUS; SUBCUTANEOUS at 02:41

## 2023-05-09 RX ADMIN — ACETAMINOPHEN 325MG 650 MG: 325 TABLET ORAL at 05:57

## 2023-05-09 RX ADMIN — SODIUM CHLORIDE 125 ML/HR: 0.9 INJECTION, SOLUTION INTRAVENOUS at 16:09

## 2023-05-09 NOTE — PLAN OF CARE
Problem: Potential for Falls  Goal: Patient will remain free of falls  Description: INTERVENTIONS:  - Educate patient/family on patient safety including physical limitations  - Instruct patient to call for assistance with activity   - Consult OT/PT to assist with strengthening/mobility   - Keep Call bell within reach  - Keep bed low and locked with side rails adjusted as appropriate  - Keep care items and personal belongings within reach  - Initiate and maintain comfort rounds  - Make Fall Risk Sign visible to staff  - Offer Toileting every 2 Hours, in advance of need  - Initiate/Maintain alarm  - Obtain necessary fall risk management equipment:   - Apply yellow socks and bracelet for high fall risk patients  - Consider moving patient to room near nurses station  Outcome: Progressing     Problem: MOBILITY - ADULT  Goal: Maintain or return to baseline ADL function  Description: INTERVENTIONS:  -  Assess patient's ability to carry out ADLs; assess patient's baseline for ADL function and identify physical deficits which impact ability to perform ADLs (bathing, care of mouth/teeth, toileting, grooming, dressing, etc )  - Assess/evaluate cause of self-care deficits   - Assess range of motion  - Assess patient's mobility; develop plan if impaired  - Assess patient's need for assistive devices and provide as appropriate  - Encourage maximum independence but intervene and supervise when necessary  - Involve family in performance of ADLs  - Assess for home care needs following discharge   - Consider OT consult to assist with ADL evaluation and planning for discharge  - Provide patient education as appropriate  Outcome: Progressing  Goal: Maintains/Returns to pre admission functional level  Description: INTERVENTIONS:  - Perform BMAT or MOVE assessment daily    - Set and communicate daily mobility goal to care team and patient/family/caregiver     - Collaborate with rehabilitation services on mobility goals if consulted  - Perform Range of Motion 3 times a day  - Reposition patient every 2 hours    - Dangle patient 3 times a day  - Stand patient 3 times a day  - Ambulate patient 3 times a day  - Out of bed to chair 3 times a day   - Out of bed for meals 3 times a day  - Out of bed for toileting  - Record patient progress and toleration of activity level   Outcome: Progressing     Problem: PAIN - ADULT  Goal: Verbalizes/displays adequate comfort level or baseline comfort level  Description: Interventions:  - Encourage patient to monitor pain and request assistance  - Assess pain using appropriate pain scale  - Administer analgesics based on type and severity of pain and evaluate response  - Implement non-pharmacological measures as appropriate and evaluate response  - Consider cultural and social influences on pain and pain management  - Notify physician/advanced practitioner if interventions unsuccessful or patient reports new pain  Outcome: Progressing     Problem: INFECTION - ADULT  Goal: Absence or prevention of progression during hospitalization  Description: INTERVENTIONS:  - Assess and monitor for signs and symptoms of infection  - Monitor lab/diagnostic results  - Monitor all insertion sites, i e  indwelling lines, tubes, and drains  - Monitor endotracheal if appropriate and nasal secretions for changes in amount and color  - Clear appropriate cooling/warming therapies per order  - Administer medications as ordered  - Instruct and encourage patient and family to use good hand hygiene technique  - Identify and instruct in appropriate isolation precautions for identified infection/condition  Outcome: Progressing  Goal: Absence of fever/infection during neutropenic period  Description: INTERVENTIONS:  - Monitor WBC    Outcome: Progressing     Problem: SAFETY ADULT  Goal: Patient will remain free of falls  Description: INTERVENTIONS:  - Educate patient/family on patient safety including physical limitations  - Instruct patient to call for assistance with activity   - Consult OT/PT to assist with strengthening/mobility   - Keep Call bell within reach  - Keep bed low and locked with side rails adjusted as appropriate  - Keep care items and personal belongings within reach  - Initiate and maintain comfort rounds  - Make Fall Risk Sign visible to staff  - Offer Toileting every 2 Hours, in advance of need  - Initiate/Maintain alarm  - Obtain necessary fall risk management equipment:   - Apply yellow socks and bracelet for high fall risk patients  - Consider moving patient to room near nurses station  Outcome: Progressing  Goal: Maintain or return to baseline ADL function  Description: INTERVENTIONS:  -  Assess patient's ability to carry out ADLs; assess patient's baseline for ADL function and identify physical deficits which impact ability to perform ADLs (bathing, care of mouth/teeth, toileting, grooming, dressing, etc )  - Assess/evaluate cause of self-care deficits   - Assess range of motion  - Assess patient's mobility; develop plan if impaired  - Assess patient's need for assistive devices and provide as appropriate  - Encourage maximum independence but intervene and supervise when necessary  - Involve family in performance of ADLs  - Assess for home care needs following discharge   - Consider OT consult to assist with ADL evaluation and planning for discharge  - Provide patient education as appropriate  Outcome: Progressing  Goal: Maintains/Returns to pre admission functional level  Description: INTERVENTIONS:  - Perform BMAT or MOVE assessment daily    - Set and communicate daily mobility goal to care team and patient/family/caregiver  - Collaborate with rehabilitation services on mobility goals if consulted  - Perform Range of Motion 3 times a day  - Reposition patient every 2 hours    - Dangle patient 3 times a day  - Stand patient 3 times a day  - Ambulate patient 3 times a day  - Out of bed to chair 3 times a day   - Out of bed for meals 3 times a day  - Out of bed for toileting  - Record patient progress and toleration of activity level   Outcome: Progressing

## 2023-05-09 NOTE — NURSING NOTE
Patient taken via stretcher for cath at this time  Pt taken with all of her belongings as pt will not return to room 221-1 after procedure

## 2023-05-09 NOTE — ASSESSMENT & PLAN NOTE
· Patient presented to hospital complaint of chest pain with ambulation  · Concern for type I NSTEMI with history suggestive of unstable angina over the past month  · Troponin 29, repeat 109  · EKG with new T wave inversions in V1 through V3  · Cardiology consult  · Monitor on telemetry  · Heparin drip, Nitro patch   · Echo ordered   · Continue close monitoring overnight for development of recurrent chest pain  · Loaded with aspirin, continue daily  · Lopressor every 6 hours  · N p o  after midnight

## 2023-05-09 NOTE — PLAN OF CARE
Problem: Potential for Falls  Goal: Patient will remain free of falls  Description: INTERVENTIONS:  - Educate patient/family on patient safety including physical limitations  - Instruct patient to call for assistance with activity   - Consult OT/PT to assist with strengthening/mobility   - Keep Call bell within reach  - Keep bed low and locked with side rails adjusted as appropriate  - Keep care items and personal belongings within reach  - Initiate and maintain comfort rounds  - Apply yellow socks and bracelet for high fall risk patients  - Consider moving patient to room near nurses station  Outcome: Progressing     Problem: MOBILITY - ADULT  Goal: Maintain or return to baseline ADL function  Description: INTERVENTIONS:  - Educate patient/family on patient safety including physical limitations  - Instruct patient to call for assistance with activity   - Consult OT/PT to assist with strengthening/mobility   - Keep Call bell within reach  - Keep bed low and locked with side rails adjusted as appropriate  - Keep care items and personal belongings within reach  - Initiate and maintain comfort rounds  - Apply yellow socks and bracelet for high fall risk patients  - Consider moving patient to room near nurses station  Outcome: Progressing  Goal: Maintains/Returns to pre admission functional level  Description: INTERVENTIONS:  - Perform BMAT or MOVE assessment daily    - Set and communicate daily mobility goal to care team and patient/family/caregiver     - Collaborate with rehabilitation services on mobility goals if consulted  - Out of bed for toileting  - Record patient progress and toleration of activity level   Outcome: Progressing     Problem: PAIN - ADULT  Goal: Verbalizes/displays adequate comfort level or baseline comfort level  Description: Interventions:  - Encourage patient to monitor pain and request assistance  - Assess pain using appropriate pain scale  - Administer analgesics based on type and severity of pain and evaluate response  - Implement non-pharmacological measures as appropriate and evaluate response  - Consider cultural and social influences on pain and pain management  - Notify physician/advanced practitioner if interventions unsuccessful or patient reports new pain  Outcome: Progressing     Problem: INFECTION - ADULT  Goal: Absence or prevention of progression during hospitalization  Description: INTERVENTIONS:  - Assess and monitor for signs and symptoms of infection  - Monitor lab/diagnostic results  - Monitor all insertion sites, i e  indwelling lines, tubes, and drains  - Monitor endotracheal if appropriate and nasal secretions for changes in amount and color  - [place] appropriate cooling/warming therapies per order  - Administer medications as ordered  - Instruct and encourage patient and family to use good hand hygiene technique  - Identify and instruct in appropriate isolation precautions for identified infection/condition  Outcome: Progressing  Goal: Absence of fever/infection during neutropenic period  Description: INTERVENTIONS:  - Monitor WBC    Outcome: Progressing     Problem: SAFETY ADULT  Goal: Patient will remain free of falls  Description: INTERVENTIONS:  - Educate patient/family on patient safety including physical limitations  - Instruct patient to call for assistance with activity   - Consult OT/PT to assist with strengthening/mobility   - Keep Call bell within reach  - Keep bed low and locked with side rails adjusted as appropriate  - Keep care items and personal belongings within reach  - Initiate and maintain comfort rounds  - Apply yellow socks and bracelet for high fall risk patients  - Consider moving patient to room near nurses station  Outcome: Progressing  Goal: Maintain or return to baseline ADL function  Description: INTERVENTIONS:  - Educate patient/family on patient safety including physical limitations  - Instruct patient to call for assistance with activity   - Consult OT/PT to assist with strengthening/mobility   - Keep Call bell within reach  - Keep bed low and locked with side rails adjusted as appropriate  - Keep care items and personal belongings within reach  - Initiate and maintain comfort rounds  - Apply yellow socks and bracelet for high fall risk patients  - Consider moving patient to room near nurses station  Outcome: Progressing  Goal: Maintains/Returns to pre admission functional level  Description: INTERVENTIONS:  - Perform BMAT or MOVE assessment daily    - Set and communicate daily mobility goal to care team and patient/family/caregiver  - Collaborate with rehabilitation services on mobility goals if consulted  - Out of bed for toileting  - Record patient progress and toleration of activity level   Outcome: Progressing     Problem: DISCHARGE PLANNING  Goal: Discharge to home or other facility with appropriate resources  Description: INTERVENTIONS:  - Identify barriers to discharge w/patient and caregiver  - Arrange for needed discharge resources and transportation as appropriate  - Identify discharge learning needs (meds, wound care, etc )  - Arrange for interpretive services to assist at discharge as needed  - Refer to Case Management Department for coordinating discharge planning if the patient needs post-hospital services based on physician/advanced practitioner order or complex needs related to functional status, cognitive ability, or social support system  Outcome: Progressing     Problem: Knowledge Deficit  Goal: Patient/family/caregiver demonstrates understanding of disease process, treatment plan, medications, and discharge instructions  Description: Complete learning assessment and assess knowledge base    Interventions:  - Provide teaching at level of understanding  - Provide teaching via preferred learning methods  Outcome: Progressing

## 2023-05-09 NOTE — ASSESSMENT & PLAN NOTE
· BP slightly elevated at time of admission  · Continue losartan  · Continue Lopressor every 6 hours

## 2023-05-09 NOTE — ASSESSMENT & PLAN NOTE
· Maintained outpatient on Crestor 20 mg daily, will increase to atorvastatin 80 mg today  · Continue fenofibrate

## 2023-05-09 NOTE — CONSULTS
Consult - Cardiology   Shana Matt 52 y o  female MRN: [id number]  Unit/Bed#: [facility] 2 -01 Encounter: [id number]        Reason For Consult:                   Assessment:  Chest pain - non-STEMI   -HS Troponin 29, 109, 213, 128 with symptoms of angina and anterolateral T wave inversions  Family history of premature CAD   -Multiple members  of MI (father was 46)  Hypertension   -O/p Rx: Cozaar 50 mg daily  Hypertriglyceridemia   -Lipids 2023: Cholesterol 268, triglycerides 1091, HDL 36, LDL not calculable               2/3/2023: Cholesterol 238, triglycerides 373, HDL 40, calculated    -Initiated on rosuvastatin 2023 (20 mg)  Diabetes mellitus   -Hemoglobin A1c 8 9 (2023)   -Metformin 1g bid, Trulicity 9 20 mg/week (initiated 2023)  Peripheral neuropathy     Discussion / Plan:  # Patient with CAD risk factors and several weeks of reasonably consistent exertional chest discomfort presented following episode of increased pain associated with some rise in troponin and dynamic ECG change  # Echocardiogram being completed (my unofficial, and cursory, review notes some distal septal, anterior, and apical wall motion abnormalities)     # Current lipid levels -notably triglycerides, are worse than earlier this year    Suspect this is in large part due to recent problems with glycemic control    • Echocardiogram being completed with formal review forthcoming  • Coronary angiography advised and discussed with patient who is agreeable ~~> will proceed today  • We will give a loading dose of Brilinta and full dose aspirin now  · Continue heparin until angiography completed  · Prehospital rosuvastatin 20 mg (non-formulary in hosp) exchanged with atorvastatin 80 mg while hospitalized ~~> continue these  · Check glycosylated hemoglobin      History Of Present Illness:  Shana Matt is predominantly [place]-speaking woman and 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 with medical problems highlighted in the assessment above     She has not had prior care by a cardiologist     During the last few weeks the patient has had intermittent recurrent episodes of exertional chest pain  Patient reports that with relative consistency when ambulating she will start to have left anterior chest pain causing her to slow her pace or pause  Yesterday, her and her daughter were walking to [employer] together  It sounds as if she for some reason began to hurry and with that she started to feel pain in her left anterior chest   She states this was stronger than prior events, and to her bilateral upper extremities, with her becoming diaphoretic  When she arrived at [facility] someone called an ambulance for her  In the ambulance the patient was given sublingual nitroglycerin with some improvement  Later in the ED apparently had some resurgence of discomfort and diaphoresis  Serial ECGs show some dynamic anterolateral T wave inversion  Initial troponin was normal at 29 later peaking at 213 with subsequent waning  She was pain-free at the time my visit and reports compliance with all prescribed medications  Past Medical History:        Past Medical History:   Diagnosis Date   • Diabetes mellitus ([facility] [address] )       Past Surgical History:   Procedure Laterality Date   •  SECTION       and    • HYSTERECTOMY W/ SALPINGO-OOPHERECTOMY Bilateral     in [address]        Allergy:        No Known Allergies    Medications:       Prior to Admission medications    Medication Sig Start Date End Date Taking? Authorizing Provider   Alcohol Swabs (Pharmacist Choice Alcohol) PADS Use in the morning 3/22/23  Yes Rito Resendez PA-C   Blood Glucose Monitoring Suppl (OneTouch Verio Reflect) w/Device KIT Check blood sugars once daily  Please substitute with appropriate alternative as covered by patient's insurance   Dx: E11 65 23  Yes Rito Resendez PA-C   fenofibrate 160 MG tablet Take 1 tablet by mouth in the morning 3/2/23  Yes Historical Provider, MD   glucose blood test strip Use as instructed 3/22/23  Yes Vanessa Nieto PA-C   losartan (COZAAR) 50 mg tablet Take 1 tablet (50 mg total) by mouth daily 3/22/23  Yes Vanessa Nieto PA-C   metFORMIN (GLUCOPHAGE) 1000 MG tablet Take 1 tablet (1,000 mg total) by mouth 2 (two) times a day with meals 2/2/23  Yes Vandana Ross Abu, PA-C   OneTouch Delica Lancets 24Z MISC Check blood sugars once daily  Please substitute with appropriate alternative as covered by patient's insurance  Dx: E11 65 3/22/23  Yes Vanessa Nieto PA-C   rosuvastatin (CRESTOR) 20 MG tablet Take 1 tablet (20 mg total) by mouth daily 2/3/23  Yes Vanessa Nieto PA-C   dulaglutide (Trulicity) 6 19 BN/7 8QC injection Inject 0 5 mL (0 75 mg total) under the skin every 7 days 2/28/23   Vanessa Nieto PA-C   DULoxetine (CYMBALTA) 30 mg delayed release capsule TAKE 1 CAPSULE TWICE A DAY BY ORAL ROUTE FOR 30 DAYS  1/26/23   Historical Provider, MD   ergocalciferol (VITAMIN D2) 50,000 units Take 1 capsule (50,000 Units total) by mouth once a week 3/22/23   Vanessa Nieto PA-C   estradiol (VIVELLE-DOT) 0 1 MG/24HR Place 1 patch on the skin 2 (two) times a week 2/20/23 2/20/24  Vanessa Nieto PA-C   famotidine (PEPCID) 20 mg tablet Take 20 mg by mouth in the morning  Patient not taking: Reported on 5/8/2023    Historical Provider, MD   meclizine (ANTIVERT) 12 5 MG tablet Take 1 tablet (12 5 mg total) by mouth 3 (three) times a day as needed for dizziness si necesita para los mareos  Patient not taking: Reported on 5/8/2023 2/28/23   Vanessa Nieto PA-C   meloxicam (MOBIC) 15 mg tablet Take 1 tablet (15 mg total) by mouth daily as needed for moderate pain  Patient not taking: Reported on 5/8/2023 4/13/23   Sari Baez MD       Family History:     History reviewed  No pertinent family history       Social History:       Social History     Socioeconomic History   • Marital status: /Civil Union Spouse name: None   • Number of children: None   • Years of education: None   • Highest education level: None   Occupational History   • None   Tobacco Use   • Smoking status: Never   • Smokeless tobacco: Never   Vaping Use   • Vaping Use: Never used   Substance and Sexual Activity   • Alcohol use: Never   • Drug use: Never   • Sexual activity: None   Other Topics Concern   • None   Social History Narrative   • None     Social Determinants of Health     Financial Resource Strain: Not on file   Food Insecurity: Not on file   Transportation Needs: Not on file   Physical Activity: Not on file   Stress: Not on file   Social Connections: Not on file   Intimate Partner Violence: Not on file   Housing Stability: Not on file       ROS:  Symptoms per HPI  The remainder of the review of systems is negative    Exam:  General:  Alert, normally conversant, comfortable appearing  Head: Normocephalic, atraumatic  Eyes:  EOMI  Pupils - equal, round, reactive to accomodation  No icterus  Normal Conjunctiva  Oropharynx: Moist without lesion  Neck:  No gross bruit, JVD, thyromegaly, or lymphadenopathy  Heart:  Regular with controlled rate  No rub nor pathologic murmur  Lungs:  Clear without rales/rhonchi/wheeze  Abdomen:  Soft and nontender with normal bowel sounds  No organomegaly or mass  Lower Limbs:  No edema  Pulses:  RLE - DP:  2+                LLE - DP:  2+  Musculoskeletal: Independent movement of limbs observed, Formal ROM and strength eval not performed  Neurologic:    Oriented to: person, place, situation  Cranial Nerves: grossly intact - vision, smell, taste, and hearing were not tested       Motor function: grossly normal, symmetric   Sensation: Was not tested    Vitals:    05/09/23 0100 05/09/23 0147 05/09/23 0517 05/09/23 0737   BP: 138/89 137/84 121/79 125/80   BP Location: Right arm Right arm     Pulse: 63 66 78    Resp: 15 18 20    Temp:   97 9 °F (36 6 °C) 98 °F (36 7 °C)   TempSrc:       SpO2: 96%  97% Weight:       Height:               DATA:      -----------  ECG:                     -----------------------------------------------------------------------------------------------------------------------------------------------  Weights: Wt Readings from Last 20 Encounters:   05/08/23 95 2 kg (209 lb 14 1 oz)   02/28/23 90 kg (198 lb 6 4 oz)   01/31/23 91 kg (200 lb 9 6 oz)   01/12/23 89 2 kg (196 lb 9 6 oz)   , Body mass index is 30 99 kg/m²           Lab Studies:           Results from last 7 days   Lab Units 05/08/23 1933   TRIGLYCERIDES mg/dL 1,091*   HDL mg/dL 36*     Results from last 7 days   Lab Units 05/09/23 0511 05/08/23 1933   WBC Thousand/uL 5 70 7 52   HEMOGLOBIN g/dL 11 2* 12 0   HEMATOCRIT % 33 7* 35 7   PLATELETS Thousands/uL 232 254   ,   Results from last 7 days   Lab Units 05/09/23 0511 05/08/23 1933   POTASSIUM mmol/L 3 5 3 9   CHLORIDE mmol/L 107 104   CO2 mmol/L 23 22   BUN mg/dL 9 16   CREATININE mg/dL 0 50* 0 71   CALCIUM mg/dL 8 4 9 6

## 2023-05-09 NOTE — ASSESSMENT & PLAN NOTE
42-year-old female presented to our institution due to chest pain  Presentation concerning for NSTEMI given troponin elevations    · Echocardiogram pending   · For cardiac catheterization today   · Await next step in management from cardiology   · Continue heparin drip

## 2023-05-09 NOTE — ASSESSMENT & PLAN NOTE
Lab Results   Component Value Date    HGBA1C 8 9 (A) 02/01/2023       No results for input(s): POCGLU in the last 72 hours      Blood Sugar Average: Last 72 hrs:  · Uncontrolled type 2 diabetes with most recent hemoglobin A1c 8 9  · Maintain outpatient on metformin and Trulicity-hold while inpatient  · Sliding scale insulin coverage with Accu-Cheks

## 2023-05-09 NOTE — UTILIZATION REVIEW
Notification of Unplanned, Urgent, or   Emergency Inpatient Admission   [address], [facility] [address] [facility]  Tax ID: [id number]  NPI: [id number]  [facility]  Admission Level of Care: Inpatient  Place of Service Code: 24     Attending Physician Information  Attending Name and NPI#: Ryder Magaña, Laura Yang [[id number]]  Phone: [phone]     Admission Information  Inpatient Admission Date/Time: 5/8/23 10:02 PM  Discharge Date/Time: No discharge date for patient encounter  Admitting Diagnosis Code/Description:  Chest pain [R07 9]  NSTEMI (non-ST elevated myocardial infarction) [facility]) [I21 4]     Utilization Review Contact  [employer] Utilization   Phone: [phone]  Fax: [phone]  Email: [email or web address]  Contact for approvals/pending authorizations, clinical reviews, and discharge  Physician Advisory Services Contact  Medical Necessity Denial & [employer]-Bfw[employer] Discussion  Phone: [phone]  Fax: [phone]  Email: [email or web address]

## 2023-05-09 NOTE — ASSESSMENT & PLAN NOTE
· Patient complaining of headache upon admission  · Likely secondary to nitroglycerin  · Tylenol as needed

## 2023-05-09 NOTE — UTILIZATION REVIEW
"Initial Clinical Review    Admission: Date/Time/Statement:   Admission Orders (From admission, onward)     Ordered        05/08/23 2202  INPATIENT ADMISSION  Once                      Orders Placed This Encounter   Procedures   • INPATIENT ADMISSION     Standing Status:   Standing     Number of Occurrences:   1     Order Specific Question:   Level of Care     Answer:   Med Surg [16]     Order Specific Question:   Bed request comments     Answer:   Telemetry     Order Specific Question:   Estimated length of stay     Answer:   More than 2 Midnights     Order Specific Question:   Certification     Answer:   I certify that inpatient services are medically necessary for this patient for a duration of greater than two midnights  See H&P and MD Progress Notes for additional information about the patient's course of treatment  ED Arrival Information     Expected   -    Arrival   5/8/2023 19:10    Acuity   Urgent            Means of arrival   Ambulance    Escorted by   [facility]    Admission type   Emergency            Arrival complaint   Chest Pain           Chief Complaint   Patient presents with   • Chest Pain     Pt brought in via EMS for complaints of chest pain and sob  Pt reports walking to school in a hurry and feeling a burning in her chest followed by sob and feeling dizzy and hot  Pt has diabetes and blood sugar 180 for EMS  Per  of aspirin given and 1 of nitro  Initial Presentation: 52 y o  female presents to the ED via EMS from [facility] with c/o diffuse CP, tight and burning, SOB, diaphoretic, lightheadeness  EMS provided \"some pills\" which improved her pain  PMH: HTN, DM, Hypertriglyceridemia  In the ED she was HTN  Labs - elevated troponins, glucose  Imaging - no acute disease  ECG - NSR w/ new T wave inversions in V1 through V3  Treated with NTG SL x 1, NTP 1 inch, IV Fluids, Morphine, Tylenol, Heparin bolus and drip    On exam regular rhythm, " no deficits  She is admitted to INPATIENT status with NSTEMI - tele, cardio consult, heparin drip, Nitro Pathc, Lopressor q 6 hr, NPO p MN  Headache - PRN Tylenol  Date: 5/9   Day 2:  Pt is CP free today  Had cath and Echo  Prox LAD cause of NSTEMI now with BRUNO  Plavix monotherapy  Heparin drip d/c       5/9 Cardio Consult - had intermittent exertional CP x 1 month and acute onset burning substernal CP w/ diaphoresis before admission  NTG and ASA by EMS eradicated CP  Had CP and diaphoresis x 1 since hospitalization  Serial ECGs show some dynamic anterolateral T wave inversion  Family h/o premature CAD  NSTEMI - troponin rise, dynamic ECG changes  Echo in process, elevated triglycerides  Will do Cardiac cath, continue Heparin drip, high dose Statin        5/9 Cardiac Cath -   NSTEMI (non-ST elevated myocardial infarction) ([facility] [address] ) [I21 4 (ICD-10-CM)]   Impression   •  Prox LAD lesion is 95% stenosed, culprit for NSTEMI, revascularized with BRUNO x 1  •  Small distal circumflex lesion, small territory (med mgmt recommended at this time)   Recommendation  1) GDMT atherosclerotic CAD  2) DAPT x 1 year, consider plavix monotherapy thereafter given young age and CAD         ED Triage Vitals   Temperature Pulse Respirations Blood Pressure SpO2   05/08/23 1913 05/08/23 1913 05/08/23 1913 05/08/23 1913 05/08/23 1913   98 4 °F (36 9 °C) 82 20 (!) 167/103 98 %      Temp Source Heart Rate Source Patient Position - Orthostatic VS BP Location FiO2 (%)   05/08/23 1913 05/08/23 1913 05/08/23 1913 05/08/23 1913 --   Oral Monitor Lying Right arm       Pain Score       05/08/23 2037       6          Wt Readings from Last 1 Encounters:   05/08/23 95 2 kg (209 lb 14 1 oz)     Additional Vital Signs:   05/09/23 09:17:09 -- -- -- 141/80 100 -- -- --   05/09/23 09:16:51 -- -- -- 141/80 100 -- -- --   05/09/23 07:37:37 98 °F (36 7 °C) -- -- 125/80 95 -- -- --   05/09/23 05:17:29 97 9 °F (36 6 °C) 78 20 121/79 93 97 % -- -- 05/09/23 0147 -- 66 18 137/84 -- -- None (Room air) Lying   05/09/23 0100 -- 63 15 138/89 109 96 % None (Room air) Lying   05/09/23 0000 -- 64 16 148/85 111 95 % None (Room air) Lying   05/08/23 2300 -- 65 16 153/97 119 95 % None (Room air) Lying   05/08/23 2230 -- 67 16 154/97 120 96 % None (Room air) Lying   05/08/23 2126 -- 69 18 157/92 118 98 % None (Room air) Lying   05/08/23 2100 -- 73 17 158/92 116 98 % None (Room air) Lying   05/08/23 2010 -- 84 18 145/91 112 97 % None (Room air) Lying   05/08/23 2000 -- 79 18 166/100 128 98 % None (Room air) Lying     Pertinent Labs/Diagnostic Test Results:   5/8 ECG - Normal sinus rhythm  Nonspecific ST and T wave abnormality  Abnormal ECG  5/8 ECG - Normal sinus rhythm  T wave abnormality, consider anterior ischemia  Abnormal ECG  5/8 ECG - Normal sinus rhythm  T wave abnormality, consider anterior ischemia  Abnormal ECG  5/8 ECG - Normal sinus rhythm  Marked T wave abnormality, consider anterior ischemia  Prolonged QT  Abnormal ECG    5/9 Echo - •  Left Ventricle: Left ventricular cavity size is normal  Wall thickness is mildly increased  There is mild concentric hypertrophy  The left ventricular ejection fraction is 54%  Systolic function is normal  Global longitudinal strain is reduced at -12 7%  Wall motion is normal  Diastolic function is mildly abnormal, consistent with grade I (abnormal) relaxation  •  The following segments are hypokinetic: apical anterior, apical septal, apical inferior, apical lateral and apex        XR chest 1 view portable   ED Interpretation by Carmen Vázquez DO (05/08 2015)   Xray reviewed and independently interpreted by me: no acute findings        Final Result by Georges Sandra MD (05/09 8115)      No acute cardiopulmonary disease  This report is in agreement with the preliminary interpretation                 Workstation performed: [id number]               Results from last 7 days   Lab Units 05/09/23  0511 05/08/23  1933 WBC Thousand/uL 5 70 7 52   HEMOGLOBIN g/dL 11 2* 12 0   HEMATOCRIT % 33 7* 35 7   PLATELETS Thousands/uL 232 254   NEUTROS ABS Thousands/µL  --  3 25         Results from last 7 days   Lab Units 05/09/23  0511 05/08/23  1933   SODIUM mmol/L 138 136   POTASSIUM mmol/L 3 5 3 9   CHLORIDE mmol/L 107 104   CO2 mmol/L 23 22   ANION GAP mmol/L 8 10   BUN mg/dL 9 16   CREATININE mg/dL 0 50* 0 71   EGFR ml/min/1 73sq m 115 101   CALCIUM mg/dL 8 4 9 6   MAGNESIUM mg/dL 1 9  --          Results from last 7 days   Lab Units 05/09/23  0735   POC GLUCOSE mg/dl 177*     Results from last 7 days   Lab Units 05/09/23 0511 05/08/23  1933   GLUCOSE RANDOM mg/dL 148* 171*         Results from last 7 days   Lab Units 05/08/23 1933   HEMOGLOBIN A1C % 8 0*   EAG mg/dl 183     Results from last 7 days   Lab Units 05/08/23  2326 05/08/23 2108 05/08/23 1933   HS TNI 0HR ng/L  --   --  29   HS TNI 2HR ng/L  --  109*  --    HSTNI D2 ng/L  --  80*  --    HS TNI 4HR ng/L 213*  --   --    HSTNI D4 ng/L 184*  --   --          Results from last 7 days   Lab Units 05/09/23 0316 05/08/23 2036   PROTIME seconds  --  12 5   INR   --  0 94   PTT seconds 49* 29     ED Treatment:   Medication Administration from 05/08/2023 1910 to 05/09/2023 0117       Date/Time Order Dose Route Action     05/08/2023 2007 EDT nitroglycerin (NITROSTAT) SL tablet 0 4 mg 0 4 mg Sublingual Given     05/08/2023 2037 EDT morphine injection 2 mg 2 mg Intravenous Given     05/08/2023 2053 EDT sodium chloride 0 9 % bolus 1,000 mL 1,000 mL Intravenous New Bag     05/08/2023 2053 EDT heparin (porcine) injection 4,000 Units 4,000 Units Intravenous Given     05/08/2023 2056 EDT heparin (porcine) 25,000 units in 0 45% NaCl 250 mL infusion (premix) 11 1 Units/kg/hr Intravenous New Bag     05/08/2023 2124 EDT metoprolol (LOPRESSOR) injection 5 mg 5 mg Intravenous Given     05/08/2023 2202 EDT nitroglycerin (NITRO-BID) 2 % TD ointment 1 inch 1 inch Topical Given     05/08/2023 2228 EDT acetaminophen (TYLENOL) tablet 975 mg 975 mg Oral Given        Past Medical History:   Diagnosis Date   • Diabetes mellitus ([facility] )      Present on Admission:  • Hypertriglyceridemia  • Type 2 diabetes mellitus (HCC)      Admitting Diagnosis: Chest pain [R07 9]  NSTEMI (non-ST elevated myocardial infarction) ([facility] [address] ) [I21 4]  Age/Sex: 52 y o  female  Admission Orders:  Scheduled Medications:  aspirin, 81 mg, Oral, Daily  atorvastatin, 80 mg, Oral, QPM  DULoxetine, 30 mg, Oral, Daily  fenofibrate, 145 mg, Oral, Daily  insulin lispro, 1-6 Units, Subcutaneous, TID AC  insulin lispro, 1-6 Units, Subcutaneous, HS  losartan, 50 mg, Oral, Daily  metoprolol, 5 mg, Intravenous, Q6H KHOI      Continuous IV Infusions:  heparin (porcine), 3-20 Units/kg/hr (Order-Specific), Intravenous, Titrated - d/c 5/9  sodium chloride, 75 mL/hr, Intravenous, Continuous - d/c 5/9      PRN Meds:  acetaminophen, 650 mg, Oral, Q6H PRN - x 1 5/9  calcium carbonate, 1,000 mg, Oral, Daily PRN  heparin (porcine), 2,000 Units, Intravenous, Q6H PRN - x 1 5/9  heparin (porcine), 4,000 Units, Intravenous, Q6H PRN  morphine injection, 2 mg, Intravenous, Q3H PRN  nitroglycerin, 0 4 mg, Sublingual, Q5 Min PRN - x 1 5/8, 5/9  ondansetron, 4 mg, Intravenous, Q6H PRN  polyethylene glycol, 17 g, Oral, Daily PRN    Tele  Heparin drip   POC GLUCOSE AC/HS WITH [employer] COVERAGE   VS q 4 hr   NPO w/ sips  Cardiac cath  IP CONSULT TO CARDIOLOGY      Network Utilization Review Department  ATTENTION: Please call with any questions or concerns to [phone] and carefully listen to the prompts so that you are directed to the right person  All voicemails are confidential   Noemi Cunningham all requests for admission clinical reviews, approved or denied determinations and any other requests to dedicated fax number below belonging to the campus where the patient is receiving treatment   List of dedicated fax numbers for the Facilities:  FACILITY NAME UR FAX NUMBER   ADMISSION DENIALS (Administrative/Medical Necessity) [phone]   [address] (Maternity/NICU/Pediatrics) Khadra Jacobo [address] Jacki Arenas  [phone]   [address] [place] [id number] [place] [address] [address] [facility] [place] [address] [phone]

## 2023-05-09 NOTE — ASSESSMENT & PLAN NOTE
Lab Results   Component Value Date    HGBA1C 8 0 (H) 05/08/2023       Recent Labs     05/09/23  0735   POCGLU 177*       Blood Sugar Average: Last 72 hrs:  (P) 177     Uncontrolled type 2 diabetes with most recent hemoglobin A1c 8 9  · Home regimen: Metformin / trulicity  · Inpatient regimen: Sliding scale

## 2023-05-09 NOTE — BRIEF OP NOTE (RAD/CATH)
Right radial cath    Findings    LAD: 95% proximal to 0% with 3 5 x 26mm kevin BRUNO, post with 4 0 and 4 5 NC  LCX: 80% distal feeds small OM branch (med mgmt recommended at this time)  RCA: Mild 30-40% proximal  LMCA: normal    Plan  1) GDMT Atherosclerotic CAD, echo,  culprit LAD for NSTEMI

## 2023-05-09 NOTE — PROGRESS NOTES
[address] [place] [address]  Progress Note  Name: Nicolasa Sanchez  MRN: [id number]  Unit/Bed#: [place] 2 -01 I Date of Admission: 5/8/2023   Date of Service: 5/9/2023 I Hospital Day: 1    Assessment/Plan   * NSTEMI (non-ST elevated myocardial infarction) [facility])  Assessment & Plan  80-year-old female presented to our institution due to chest pain  Presentation concerning for NSTEMI given troponin elevations  · Echocardiogram pending   · For cardiac catheterization today   · Await next step in management from cardiology   · Continue heparin drip     Primary hypertension  Assessment & Plan  Better controlled  · Continue losartan    Headache  Assessment & Plan  Complained of headache upon admission  Resolved  Possibly nitroglycerin related    Type 2 diabetes mellitus [facility])  Assessment & Plan  Lab Results   Component Value Date    HGBA1C 8 0 (H) 05/08/2023       Recent Labs     05/09/23  0735   POCGLU 177*       Blood Sugar Average: Last 72 hrs:  (P) 177     Uncontrolled type 2 diabetes with most recent hemoglobin A1c 8 9  · Home regimen: Metformin / trulicity  · Inpatient regimen: Sliding scale    Hypertriglyceridemia  Assessment & Plan  · Switch nonformulary Crestor to atorvastatin high-dose while inpatient  · Continue fenofibrate         VTE Pharmacologic Prophylaxis:   Pharmacologic: Heparin Drip  Mechanical VTE Prophylaxis in Place: Yes    Discussions with Specialists or Other Care Team Provider: nursing    Education and Discussions with Family / Patient: patient    Current Length of Stay: 1 day(s)    Current Patient Status: Inpatient   Certification Statement: The patient will continue to require additional inpatient hospital stay due to cardiac cath, cards reeval    Discharge Plan: active    Code Status: Level 1 - Full Code      Subjective:   Patient seen and examined at bedside  [employer] services were utilized  ID [id number] 5/9/2023  Currently chest pain free      Objective:     Vitals: Temp (24hrs), Av 1 °F (36 7 °C), Min:97 9 °F (36 6 °C), Max:98 4 °F (36 9 °C)    Temp:  [97 9 °F (36 6 °C)-98 4 °F (36 9 °C)] 98 °F (36 7 °C)  HR:  [63-84] 78  Resp:  [15-20] 20  BP: (121-167)/() 141/80  SpO2:  [95 %-98 %] 97 %  Body mass index is 30 99 kg/m²  Input and Output Summary (last 24 hours): Intake/Output Summary (Last 24 hours) at 2023 1037  Last data filed at 2023 2323  Gross per 24 hour   Intake 1000 ml   Output --   Net 1000 ml       Physical Exam:     Physical Exam  Vitals reviewed  Constitutional:       General: She is not in acute distress  HENT:      Head: Normocephalic  Nose: Nose normal       Mouth/Throat:      Mouth: Mucous membranes are moist    Eyes:      General: No scleral icterus  Cardiovascular:      Rate and Rhythm: Normal rate and regular rhythm  Pulmonary:      Effort: Pulmonary effort is normal  No respiratory distress  Breath sounds: No wheezing or rales  Abdominal:      General: There is no distension  Palpations: Abdomen is soft  Tenderness: There is no abdominal tenderness  Musculoskeletal:      Right lower leg: No edema  Left lower leg: No edema  Skin:     General: Skin is warm  Neurological:      Mental Status: She is alert and oriented to person, place, and time     Psychiatric:         Mood and Affect: Mood normal          Behavior: Behavior normal        Additional Data:     Labs:    Results from last 7 days   Lab Units 23  0511 23  1933   WBC Thousand/uL 5 70 7 52   HEMOGLOBIN g/dL 11 2* 12 0   HEMATOCRIT % 33 7* 35 7   PLATELETS Thousands/uL 232 254   NEUTROS PCT %  --  43   LYMPHS PCT %  --  48*   MONOS PCT %  --  6   EOS PCT %  --  2     Results from last 7 days   Lab Units 23  0511   SODIUM mmol/L 138   POTASSIUM mmol/L 3 5   CHLORIDE mmol/L 107   CO2 mmol/L 23   BUN mg/dL 9   CREATININE mg/dL 0 50*   ANION GAP mmol/L 8   CALCIUM mg/dL 8 4   GLUCOSE RANDOM mg/dL 148*     Results from last 7 days   Lab Units 05/08/23 2036   INR  0 94     Results from last 7 days   Lab Units 05/09/23  0735   POC GLUCOSE mg/dl 177*     Results from last 7 days   Lab Units 05/08/23  1933   HEMOGLOBIN A1C % 8 0*               * I Have Reviewed All Lab Data Listed Above  * Additional Pertinent Lab Tests Reviewed: [address] Admission Reviewed      Lines:   Invasive Devices     Peripheral Intravenous Line  Duration           Peripheral IV 05/08/23 Distal;Left;Upper;Ventral (anterior) Arm <1 day    Peripheral IV 05/08/23 Dorsal (posterior); Right Hand <1 day                   Imaging:    Imaging Reports Reviewed Today Include: xr chest    Recent Cultures (last 7 days):           Last 24 Hours Medication List:   Current Facility-Administered Medications   Medication Dose Route Frequency Provider Last Rate   • acetaminophen  650 mg Oral Q6H PRN Dl Finley PA-C     • aspirin  81 mg Oral Daily Yuli Garduno PA-C     • atorvastatin  80 mg Oral QPM Dl Finley PA-C     • calcium carbonate  1,000 mg Oral Daily PRN Dl Finley PA-C     • DULoxetine  30 mg Oral Daily Dl Finley PA-C     • fenofibrate  145 mg Oral Daily Dl Finley PA-C     • heparin (porcine)  3-20 Units/kg/hr (Order-Specific) Intravenous Titrated Alize Andre DO 13 1 Units/kg/hr (05/09/23 0718)   • heparin (porcine)  2,000 Units Intravenous Q6H PRN Alize Andre DO     • heparin (porcine)  4,000 Units Intravenous Q6H PRN Alize Andre DO     • insulin lispro  1-6 Units Subcutaneous TID AC Dl Finley PA-C     • insulin lispro  1-6 Units Subcutaneous HS Dl Finley PA-C     • losartan  50 mg Oral Daily Yuil Garduno PA-C     • morphine injection  2 mg Intravenous Q3H PRN Dl Finley PA-C     • nitroglycerin  0 4 mg Sublingual Q5 Min PRN Alize Andre DO     • ondansetron  4 mg Intravenous Q6H PRN Dl Finley PA-C     • polyethylene glycol  17 g Oral Daily PRN Heavenly Big Juan Agee PA-C     • sodium chloride  75 mL/hr Intravenous Continuous Joe Hilton PA-C 75 mL/hr (05/09/23 1009)        Today, Patient Was Seen By: Miguel Prince MD    ** Please Note: Dictation voice to text software may have been used in the creation of this document   **

## 2023-05-09 NOTE — H&P
[facility] [place]  H&P  Name: Cedric Kim 52 y o  female I MRN: [id number]  Unit/Bed#: ED-04 I Date of Admission: 5/8/2023   Date of Service: 5/8/2023 I Hospital Day: 0      Assessment/Plan   * NSTEMI (non-ST elevated myocardial infarction) [facility])  Assessment & Plan  · Patient presented to hospital complaint of chest pain with ambulation  · Concern for type I NSTEMI with history suggestive of unstable angina over the past month  · Troponin 29, repeat 109  · EKG with new T wave inversions in V1 through V3  · Cardiology consult  · Monitor on telemetry  · Heparin drip, Nitro patch   · Echo ordered   · Continue close monitoring overnight for development of recurrent chest pain  · Loaded with aspirin, continue daily  · Lopressor every 6 hours  · N p o  after midnight    Primary hypertension  Assessment & Plan  · BP slightly elevated at time of admission  · Continue losartan  · Continue Lopressor every 6 hours    Headache  Assessment & Plan  · Patient complaining of headache upon admission  · Likely secondary to nitroglycerin  · Tylenol as needed    Type 2 diabetes mellitus ([facility] [address] )  Assessment & Plan  Lab Results   Component Value Date    HGBA1C 8 9 (A) 02/01/2023       No results for input(s): POCGLU in the last 72 hours  Blood Sugar Average: Last 72 hrs:  · Uncontrolled type 2 diabetes with most recent hemoglobin A1c 8 9  · Maintain outpatient on metformin and Trulicity-hold while inpatient  · Sliding scale insulin coverage with Accu-Cheks    Hypertriglyceridemia  Assessment & Plan  · Maintained outpatient on Crestor 20 mg daily, will increase to atorvastatin 80 mg today  · Continue fenofibrate       VTE Pharmacologic Prophylaxis: VTE Score: 3 Moderate Risk (Score 3-4) - Pharmacological DVT Prophylaxis Ordered: heparin drip  Code Status: No Order full code  Discussion with family: None       Anticipated Length of Stay: Patient will be admitted on an inpatient basis with an anticipated length of stay of greater than 2 midnights secondary to NSTEMI  Total Time Spent on Date of Encounter in care of patient: 65 minutes This time was spent on one or more of the following: performing physical exam; counseling and coordination of care; obtaining or reviewing history; documenting in the medical record; reviewing/ordering tests, medications or procedures; communicating with other healthcare professionals and discussing with patient's family/caregivers  Chief Complaint: Chest pain      History of Present Illness:  Frances Sifuentes is a 52 y o  female with a PMH of diabetes and high blood pressure who presents with chest pain  Patient is Guinean-speaking only, HPI obtained with "[employer]" translation  Patient reports she was ambulating quickly this evening when she developed acute onset substernal chest burning  She notes she has had intermittent chest pain with exertion over the past month but that this episode lasted longer and was much more severe  She denies history of CAD or MI  Has never seen a cardiologist   Does report history of hypertension and diabetes  She received aspirin and nitroglycerin per EMS and noted complete resolution in her chest pain  Chest pain has not recurred during hospitalization  She does report associated diaphoresis but denies any nausea or vomiting  Review of Systems:  Review of Systems   Constitutional: Positive for diaphoresis  Negative for chills and fever  HENT: Negative for trouble swallowing  Eyes: Negative for visual disturbance  Respiratory: Negative for cough and shortness of breath  Cardiovascular: Positive for chest pain  Negative for leg swelling  Gastrointestinal: Negative for abdominal pain, nausea and vomiting  Genitourinary: Negative for difficulty urinating  Musculoskeletal: Negative for back pain and gait problem  Skin: Negative for rash  Neurological: Positive for headaches  Negative for dizziness  Psychiatric/Behavioral: Negative for confusion  Past Medical and Surgical History:   Past Medical History:   Diagnosis Date   • Diabetes mellitus [facility])        Past Surgical History:   Procedure Laterality Date   •  SECTION       and    • HYSTERECTOMY W/ SALPINGO-OOPHERECTOMY Bilateral     in [address]       Meds/Allergies:  Prior to Admission medications    Medication Sig Start Date End Date Taking? Authorizing Provider   Alcohol Swabs (Pharmacist Choice Alcohol) PADS Use in the morning 3/22/23  Yes Pee Arrieta PA-C   Blood Glucose Monitoring Suppl (OneTouch Verio Reflect) w/Device KIT Check blood sugars once daily  Please substitute with appropriate alternative as covered by patient's insurance  Dx: E11 65 23  Yes Pee Arrieta PA-C   fenofibrate 160 MG tablet Take 1 tablet by mouth in the morning 3/2/23  Yes Historical Provider, MD   glucose blood test strip Use as instructed 3/22/23  Yes Pee Arrieta PA-C   losartan (COZAAR) 50 mg tablet Take 1 tablet (50 mg total) by mouth daily 3/22/23  Yes Pee Arrieta PA-C   metFORMIN (GLUCOPHAGE) 1000 MG tablet Take 1 tablet (1,000 mg total) by mouth 2 (two) times a day with meals 23  Yes Vandana Maldonado PA-C   OneTouch Delica Lancets 05Z MISC Check blood sugars once daily  Please substitute with appropriate alternative as covered by patient's insurance   Dx: E11 65 3/22/23  Yes Pee Arrieta PA-C   rosuvastatin (CRESTOR) 20 MG tablet Take 1 tablet (20 mg total) by mouth daily 2/3/23  Yes Pee Arrieta PA-C   dulaglutide (Trulicity) 7 66 KD/4 4BE injection Inject 0 5 mL (0 75 mg total) under the skin every 7 days 23   Pee Arrieta PA-C   DULoxetine (CYMBALTA) 30 mg delayed release capsule TAKE 1 CAPSULE TWICE A DAY BY ORAL ROUTE FOR 30 DAYS  23   Historical Provider, MD   ergocalciferol (VITAMIN D2) 50,000 units Take 1 capsule (50,000 Units total) by mouth once a week 3/22/23   Pee Arrieta PA-C   estradiol (VIVELLE-DOT) 0 1 MG/24HR Place 1 patch on the skin 2 (two) times a week 2/20/23 2/20/24  Christine Lloyd PA-C   famotidine (PEPCID) 20 mg tablet Take 20 mg by mouth in the morning  Patient not taking: Reported on 5/8/2023    Historical Provider, MD georgezine (ANTIVERT) 12 5 MG tablet Take 1 tablet (12 5 mg total) by mouth 3 (three) times a day as needed for dizziness si necesita para los mareos  Patient not taking: Reported on 5/8/2023 2/28/23   Christine Lloyd PA-C   meloxicam (MOBIC) 15 mg tablet Take 1 tablet (15 mg total) by mouth daily as needed for moderate pain  Patient not taking: Reported on 5/8/2023 4/13/23   Sonja Serrato MD     I have reviewed home medications with patient personally  Allergies: No Known Allergies    Social History:  Marital Status: /Civil Union   Occupation: Unknown  Patient Pre-hospital Living Situation: Home  Patient Pre-hospital Level of Mobility: walks  Patient Pre-hospital Diet Restrictions: Diabetic  Substance Use History:   Social History     Substance and Sexual Activity   Alcohol Use Never     Social History     Tobacco Use   Smoking Status Never   Smokeless Tobacco Never     Social History     Substance and Sexual Activity   Drug Use Never       Family History:  History reviewed  No pertinent family history  Physical Exam:     Vitals:   Blood Pressure: 157/92 (05/08/23 2126)  Pulse: 69 (05/08/23 2126)  Temperature: 98 4 °F (36 9 °C) (05/08/23 1913)  Temp Source: Oral (05/08/23 1913)  Respirations: 18 (05/08/23 2126)  Weight - Scale: 95 2 kg (209 lb 14 1 oz) (05/08/23 1913)  SpO2: 98 % (05/08/23 2126)    Physical Exam  Vitals reviewed  Constitutional:       General: She is not in acute distress  HENT:      Head: Normocephalic and atraumatic  Eyes:      General: No scleral icterus  Conjunctiva/sclera: Conjunctivae normal    Cardiovascular:      Rate and Rhythm: Normal rate and regular rhythm  Heart sounds: No murmur heard    Pulmonary:      Effort: Pulmonary effort is normal       Breath sounds: Normal breath sounds  Abdominal:      General: Bowel sounds are normal  There is no distension  Palpations: Abdomen is soft  Tenderness: There is no abdominal tenderness  Musculoskeletal:      Cervical back: Neck supple  Right lower leg: No edema  Left lower leg: No edema  Skin:     General: Skin is warm and dry  Neurological:      Mental Status: She is alert and oriented to person, place, and time  Psychiatric:         Mood and Affect: Mood normal          Behavior: Behavior normal           Additional Data:     Lab Results:  Results from last 7 days   Lab Units 05/08/23  1933   WBC Thousand/uL 7 52   HEMOGLOBIN g/dL 12 0   HEMATOCRIT % 35 7   PLATELETS Thousands/uL 254   NEUTROS PCT % 43   LYMPHS PCT % 48*   MONOS PCT % 6   EOS PCT % 2     Results from last 7 days   Lab Units 05/08/23  1933   SODIUM mmol/L 136   POTASSIUM mmol/L 3 9   CHLORIDE mmol/L 104   CO2 mmol/L 22   BUN mg/dL 16   CREATININE mg/dL 0 71   ANION GAP mmol/L 10   CALCIUM mg/dL 9 6   GLUCOSE RANDOM mg/dL 171*     Results from last 7 days   Lab Units 05/08/23  2036   INR  0 94                   Lines/Drains:  Invasive Devices     Peripheral Intravenous Line  Duration           Peripheral IV 05/08/23 Distal;Left;Upper;Ventral (anterior) Arm <1 day    Peripheral IV 05/08/23 Dorsal (posterior); Right Hand <1 day                    Imaging: No pertinent imaging reviewed  XR chest 1 view portable   ED Interpretation by Krysten Rinaldi DO (05/08 2015)   Xray reviewed and independently interpreted by me: no acute findings            EKG and Other Studies Reviewed on Admission:   · EKG: NSR  HR 72     ** Please Note: This note has been constructed using a voice recognition system   **

## 2023-05-10 VITALS
SYSTOLIC BLOOD PRESSURE: 142 MMHG | RESPIRATION RATE: 18 BRPM | OXYGEN SATURATION: 98 % | DIASTOLIC BLOOD PRESSURE: 90 MMHG | BODY MASS INDEX: 30.96 KG/M2 | HEIGHT: 69 IN | WEIGHT: 209 LBS | TEMPERATURE: 97.9 F | HEART RATE: 72 BPM

## 2023-05-10 DIAGNOSIS — I21.4 NSTEMI (NON-ST ELEVATED MYOCARDIAL INFARCTION) (HCC): ICD-10-CM

## 2023-05-10 DIAGNOSIS — Z98.61 S/P PTCA (PERCUTANEOUS TRANSLUMINAL CORONARY ANGIOPLASTY): Primary | ICD-10-CM

## 2023-05-10 PROBLEM — I25.119 CORONARY ARTERY DISEASE INVOLVING NATIVE CORONARY ARTERY OF NATIVE HEART WITH ANGINA PECTORIS (HCC): Status: ACTIVE | Noted: 2023-05-10

## 2023-05-10 LAB
ANION GAP SERPL CALCULATED.3IONS-SCNC: 8 MMOL/L (ref 4–13)
BASOPHILS # BLD AUTO: 0.04 THOUSANDS/ÂΜL (ref 0–0.1)
BASOPHILS NFR BLD AUTO: 1 % (ref 0–1)
BUN SERPL-MCNC: 9 MG/DL (ref 5–25)
CALCIUM SERPL-MCNC: 8.6 MG/DL (ref 8.4–10.2)
CHLORIDE SERPL-SCNC: 108 MMOL/L (ref 96–108)
CO2 SERPL-SCNC: 21 MMOL/L (ref 21–32)
CREAT SERPL-MCNC: 0.61 MG/DL (ref 0.6–1.3)
EOSINOPHIL # BLD AUTO: 0.16 THOUSAND/ÂΜL (ref 0–0.61)
EOSINOPHIL NFR BLD AUTO: 3 % (ref 0–6)
ERYTHROCYTE [DISTWIDTH] IN BLOOD BY AUTOMATED COUNT: 13.7 % (ref 11.6–15.1)
GFR SERPL CREATININE-BSD FRML MDRD: 108 ML/MIN/1.73SQ M
GLUCOSE SERPL-MCNC: 158 MG/DL (ref 65–140)
GLUCOSE SERPL-MCNC: 170 MG/DL (ref 65–140)
GLUCOSE SERPL-MCNC: 176 MG/DL (ref 65–140)
HCT VFR BLD AUTO: 35.8 % (ref 34.8–46.1)
HGB BLD-MCNC: 11.7 G/DL (ref 11.5–15.4)
IMM GRANULOCYTES # BLD AUTO: 0.02 THOUSAND/UL (ref 0–0.2)
IMM GRANULOCYTES NFR BLD AUTO: 0 % (ref 0–2)
LYMPHOCYTES # BLD AUTO: 1.99 THOUSANDS/ÂΜL (ref 0.6–4.47)
LYMPHOCYTES NFR BLD AUTO: 37 % (ref 14–44)
MAGNESIUM SERPL-MCNC: 1.9 MG/DL (ref 1.9–2.7)
MCH RBC QN AUTO: 28.4 PG (ref 26.8–34.3)
MCHC RBC AUTO-ENTMCNC: 32.7 G/DL (ref 31.4–37.4)
MCV RBC AUTO: 87 FL (ref 82–98)
MONOCYTES # BLD AUTO: 0.34 THOUSAND/ÂΜL (ref 0.17–1.22)
MONOCYTES NFR BLD AUTO: 6 % (ref 4–12)
NEUTROPHILS # BLD AUTO: 2.79 THOUSANDS/ÂΜL (ref 1.85–7.62)
NEUTS SEG NFR BLD AUTO: 53 % (ref 43–75)
NRBC BLD AUTO-RTO: 0 /100 WBCS
PLATELET # BLD AUTO: 236 THOUSANDS/UL (ref 149–390)
PMV BLD AUTO: 11.1 FL (ref 8.9–12.7)
POTASSIUM SERPL-SCNC: 3.7 MMOL/L (ref 3.5–5.3)
RBC # BLD AUTO: 4.12 MILLION/UL (ref 3.81–5.12)
SODIUM SERPL-SCNC: 137 MMOL/L (ref 135–147)
WBC # BLD AUTO: 5.34 THOUSAND/UL (ref 4.31–10.16)

## 2023-05-10 RX ORDER — METOPROLOL SUCCINATE 25 MG/1
25 TABLET, EXTENDED RELEASE ORAL
Qty: 90 TABLET | Refills: 0 | Status: SHIPPED | OUTPATIENT
Start: 2023-05-10 | End: 2023-08-08

## 2023-05-10 RX ORDER — ASPIRIN 81 MG/1
81 TABLET, CHEWABLE ORAL DAILY
Qty: 90 TABLET | Refills: 0 | Status: SHIPPED | OUTPATIENT
Start: 2023-05-11 | End: 2023-08-09

## 2023-05-10 RX ORDER — LOSARTAN POTASSIUM 50 MG/1
50 TABLET ORAL DAILY
Qty: 90 TABLET | Refills: 0 | Status: SHIPPED | OUTPATIENT
Start: 2023-05-10 | End: 2023-08-08

## 2023-05-10 RX ORDER — ROSUVASTATIN CALCIUM 20 MG/1
20 TABLET, COATED ORAL DAILY
Qty: 90 TABLET | Refills: 0 | Status: SHIPPED | OUTPATIENT
Start: 2023-05-10 | End: 2023-05-19 | Stop reason: SDUPTHER

## 2023-05-10 RX ADMIN — TICAGRELOR 90 MG: 90 TABLET ORAL at 08:59

## 2023-05-10 RX ADMIN — DULOXETINE HYDROCHLORIDE 30 MG: 30 CAPSULE, DELAYED RELEASE ORAL at 08:58

## 2023-05-10 RX ADMIN — LOSARTAN POTASSIUM 50 MG: 50 TABLET, FILM COATED ORAL at 08:58

## 2023-05-10 RX ADMIN — INSULIN LISPRO 1 UNITS: 100 INJECTION, SOLUTION INTRAVENOUS; SUBCUTANEOUS at 11:59

## 2023-05-10 RX ADMIN — ENOXAPARIN SODIUM 40 MG: 100 INJECTION SUBCUTANEOUS at 08:59

## 2023-05-10 RX ADMIN — INSULIN LISPRO 1 UNITS: 100 INJECTION, SOLUTION INTRAVENOUS; SUBCUTANEOUS at 08:59

## 2023-05-10 RX ADMIN — ASPIRIN 81 MG 81 MG: 81 TABLET ORAL at 08:59

## 2023-05-10 RX ADMIN — FENOFIBRATE 145 MG: 145 TABLET, FILM COATED ORAL at 08:59

## 2023-05-10 NOTE — DISCHARGE SUMMARY
"[facility]  Discharge- Flakita Friendly 1975, 52 y o  female MRN: [id number]  Unit/Bed#: E4 -01 Encounter: [id number]  Primary Care Provider: Chris Ocasio PA-C   Date and time admitted to hospital: 5/8/2023  7:10 PM    * NSTEMI (non-ST elevated myocardial infarction) [facility])  Assessment & Plan  24-year-old female presented to our institution due to chest pain  Presentation concerning for NSTEMI given troponin elevations  · Cleared by cardiology for discharge  DAPT aspirin, Brillintat  · Continue crestor  · Tricor possibly will be substituted by cardiology on an outpatient basis  · Continue losartan and metoprolol    Primary hypertension  Assessment & Plan  Controlled with losartan and metoprolol    Headache  Assessment & Plan  Complained of headache upon admission  Resolved  Possibly nitroglycerin related       Type 2 diabetes mellitus [facility])  Assessment & Plan  Lab Results   Component Value Date    HGBA1C 8 0 (H) 05/08/2023       Recent Labs     05/09/23  1542 05/09/23  2101 05/10/23  0803 05/10/23  1116   POCGLU 151* 137 176* 158*       Blood Sugar Average: Last 72 hrs:  (P) 154 [id number]     Type 2 diabetes with most recent hemoglobin A1c 8 9  · Continue Home regimen: Metformin / trulicity  · Follow-up with PCP    Hypertriglyceridemia  Assessment & Plan  · Continue fenofibrate  • Per cardiology: \"Long-term patient would benefit by Vascepa rather than Tricor since Vascepa has controlled study evidence of reduced cardiac events and Tricor does not\"  • To be discussed on an outpatient basis      Discharging Physician / Practitioner: Racquel Villarreal MD  PCP: Chris Ocasio PA-C  Admission Date:   Admission Orders (From admission, onward)     Ordered        05/08/23 2202  INPATIENT ADMISSION  Once                      Discharge Date: 05/10/23    Medical Problems     Resolved Problems  Date Reviewed: 5/10/2023   None         Consultations During Hospital " Stay:  · cardiology    Procedures Performed:   · Cardiac catheterization:    •  Prox LAD lesion is 95% stenosed, culprit for NSTEMI, revascularized with BRUNO x 1  •  Small distal circumflex lesion, small territory (med mgmt recommended at this time)    1) GDMT atherosclerotic CAD  2) DAPT x 1 year, consider plavix monotherapy thereafter given young age and CAD      Significant Findings / Test Results:   · Imaging  · XR Chest:    No acute cardiopulmonary disease      This report is in agreement with the preliminary interpretation  Echo:    •  Left Ventricle: Left ventricular cavity size is normal  Wall thickness is mildly increased  There is mild concentric hypertrophy  The left ventricular ejection fraction is 54%  Systolic function is normal  Global longitudinal strain is reduced at -12 7%  Wall motion is normal  Diastolic function is mildly abnormal, consistent with grade I (abnormal) relaxation  •  The following segments are hypokinetic: apical anterior, apical septal, apical inferior, apical lateral and apex  Outpatient Tests Requested:  · Pcp, cbc, bmp, cardiology    Complications:  none    Reason for Admission: NSTEMI    Hospital Course:     Agusto Miller is a 52 y o  female patient who originally presented to the hospital on 5/8/2023 due to chest pain  59-year-old Pashto-speaking female with history of hypertension, hypertriglyceridemia, and diabetes mellitus type 2 presented to our institution due to chest pain  Her troponin levels up trended  Cardiology was consulted  Cardiac catheterization was performed which showed:    LAD: 95% proximal to 0% with 3 5 x 26mm kevin BRUNO, post with 4 0 and 4 5 NC  LCX: 80% distal feeds small OM branch (med mgmt recommended at this time)  RCA: Mild 30-40% proximal  LMCA: normal    Cardiology would like her to be discharged with doing well antiplatelet therapy with aspirin and Brilinta    She will also be discharged with losartan, metoprolol, statin, "and Tricor (which might be switched on an outpatient basis)  Patient reports she is feeling much better and currently has no chest pain or shortness of breath  Patient agrees to follow-up with her providers as scheduled and to take her medications as prescribed  All questions were addressed  she understood the need to seek immediate medical attention should she develop any chest pain, shortness of breath, severe pain, fever, chills, or any other concerning symptoms  Please see above list of diagnoses and related plan for additional information  Condition at Discharge: fair     Discharge Day Visit / Exam:     Subjective:  Patient seen and examined at bedside  Comfortable  No new issues overnight  Vitals: Blood Pressure: 142/90 (05/10/23 1038)  Pulse: 72 (05/10/23 1038)  Temperature: 97 9 °F (36 6 °C) (05/10/23 1038)  Temp Source: Temporal (05/10/23 1038)  Respirations: 18 (05/10/23 1038)  Height: 5' 9\" (175 3 cm) (05/09/23 0916)  Weight - Scale: 94 8 kg (209 lb) (05/09/23 0916)  SpO2: 98 % (05/10/23 1038)  Exam:   Physical Exam  Vitals reviewed  Constitutional:       General: She is not in acute distress  HENT:      Head: Normocephalic  Nose: Nose normal       Mouth/Throat:      Mouth: Mucous membranes are moist    Eyes:      General: No scleral icterus  Cardiovascular:      Rate and Rhythm: Normal rate and regular rhythm  Pulmonary:      Effort: Pulmonary effort is normal  No respiratory distress  Breath sounds: No wheezing or rales  Abdominal:      General: There is no distension  Palpations: Abdomen is soft  Tenderness: There is no abdominal tenderness  Musculoskeletal:      Right lower leg: No edema  Left lower leg: No edema  Skin:     General: Skin is warm  Neurological:      Mental Status: She is alert and oriented to person, place, and time     Psychiatric:         Mood and Affect: Mood normal          Behavior: Behavior normal        Discharge " instructions/Information to patient and family:   See after visit summary for information provided to patient and family  Provisions for Follow-Up Care:  See after visit summary for information related to follow-up care and any pertinent home health orders  Disposition:     Home    Planned Readmission: No     Discharge Statement:  I spent 38 minutes discharging the patient  This time was spent on the day of discharge  I had direct contact with the patient on the day of discharge  Greater than 50% of the total time was spent examining patient, answering all patient questions, arranging and discussing plan of care with patient as well as directly providing post-discharge instructions  Additional time then spent on discharge activities  Discharge Medications:  See after visit summary for reconciled discharge medications provided to patient and family        ** Please Note: This note has been constructed using a voice recognition system **

## 2023-05-10 NOTE — PROGRESS NOTES
Progress Note - Cardiology   Augustine Diamond 52 y o  female MRN: [id number]  Unit/Bed#: E4 -01 Encounter: [id number]          Assessment / Plan  Non-STEMI              --HS Troponin 29, 109, 213, 128 with symptoms of angina and anterolateral T wave inversions   --Cath 5/9/23: Culprit pLAD 95%-->BRUNO 0% residual stenosis         -Other disease    *LCX: 80% distal - feeds small OM branch (med mgmt recommended at this time)    *RCA: Mild 30-40% proximal   -- Echo 5/9/2023: LVEF 54%, apical wall motion abnormalities, grade 1 DD  Hypertension              -O/p Rx: Cozaar 50 mg daily --> beta-blocker added this hospitalization for CAD and better BP control  Hypertriglyceridemia              -Lipids 5/8/2023: Cholesterol 268, triglycerides 1091, HDL 36, LDL not calculable                          2/3/2023: Cholesterol 238, triglycerides 373, HDL 40, calculated               -Initiated on rosuvastatin February 2023 (20 mg)  Diabetes mellitus              -Hemoglobin A1c 8 9 (2/1/2023)              -Metformin 1g bid, Trulicity 6 75 mg/week (initiated February 2023)  Peripheral neuropathy    · The results of the patient's catheterization, post hospital care, and chronic management were discussed with the patient and all her questions answered  · Continue dual antiplatelet therapy (ASA + Brilinta) ~~> we will check with pharmacy regarding cost of Brilinta  · At discharge can resume Crestor 20 mg (and tricor) as taken prior to admission (improved glycemic control will also be beneficial to lipid levels)  · Continue Cozaar 50 mg daily (taken PTA) + Toprol 25 mg at bedtime (new)  · An order has been placed for outpatient cardiac rehab        Subjective:  Feeling well this morning with no new complaint      Vitals:  Vitals:    05/08/23 2230 05/09/23 0916   Weight: 95 2 kg (209 lb 14 1 oz) 94 8 kg (209 lb)   ,  Vitals:    05/09/23 1845 05/09/23 2134 05/09/23 2300 05/10/23 0801   BP: 153/85 140/86 139/78 144/88   BP Location: Left arm Left arm Left arm Left arm   Patient Position:       Cuff Size:       Pulse: 70 62 64 70   Resp: 18 16 16 17   Temp:  98 °F (36 7 °C) (!) 97 3 °F (36 3 °C) 98 7 °F (37 1 °C)   TempSrc:  Temporal Temporal Temporal   SpO2:  97% 95% 98%   Weight:       Height:           Exam:  General:  Alert normally conversant and comfortable-appearing  Heart:  Regular with controlled rate and no pathologic murmur or rub  Right wrist (site of catheterization access): No ecchymosis or hematoma    Normal capillary refill and pulse  Lungs:  Clear throughout  Lower Limbs:  No edema           Telemetry:       Sinus rhythm with ventricular rate trend in the 70s and without other dysrhythmia    Medications:    Current Facility-Administered Medications:   •  acetaminophen (TYLENOL) tablet 650 mg, 650 mg, Oral, Q6H PRN, Roman Ramirez PA-C, 650 mg at 05/09/23 0557  •  aspirin chewable tablet 81 mg, 81 mg, Oral, Daily, Pavel Worthy PA-C, 81 mg at 05/10/23 3455  •  atorvastatin (LIPITOR) tablet 80 mg, 80 mg, Oral, QPM, ANTHONY Oscar-MALKA, 80 mg at 05/09/23 1705  •  calcium carbonate (TUMS) chewable tablet 1,000 mg, 1,000 mg, Oral, Daily PRN, ANTHONY Oscar-MALKA, 1,000 mg at 05/09/23 1606  •  DULoxetine (CYMBALTA) delayed release capsule 30 mg, 30 mg, Oral, Daily, Roman Ramirez PA-C, 30 mg at 05/10/23 0858  •  enoxaparin (LOVENOX) subcutaneous injection 40 mg, 40 mg, Subcutaneous, Q24H [facility], Pavel Worthy PA-C, 40 mg at 05/10/23 0859  •  fenofibrate (TRICOR) tablet 145 mg, 145 mg, Oral, Daily, Roman Ramirez PA-C, 145 mg at 05/10/23 0859  •  insulin lispro (HumaLOG) 100 units/mL subcutaneous injection 1-6 Units, 1-6 Units, Subcutaneous, TID AC, 1 Units at 05/10/23 0859 **AND** Fingerstick Glucose (POCT), , , TID AC, Roman Ramirez PA-C  •  insulin lispro (HumaLOG) 100 units/mL subcutaneous injection 1-6 Units, 1-6 Units, Subcutaneous, HS, Roman Ramirez PA-C, 1 Units at 05/09/23 0241  • losartan (COZAAR) tablet 50 mg, 50 mg, Oral, Daily, [facility], 50 mg at 05/10/23 6648  •  metoprolol succinate (TOPROL-XL) 24 hr tablet 25 mg, 25 mg, Oral, HS, ANTHONY Ferguson-C, 25 mg at 05/09/23 2135  •  morphine injection 2 mg, 2 mg, Intravenous, Q3H PRN, Dilia Mccauley PA-C  •  nitroglycerin (NITROSTAT) SL tablet 0 4 mg, 0 4 mg, Sublingual, Q5 Min PRN, Alize Andre DO, 0 4 mg at 05/09/23 0145  •  ondansetron (ZOFRAN) injection 4 mg, 4 mg, Intravenous, Q6H PRN, Dilia Mccauley PA-C, 4 mg at 05/09/23 1604  •  polyethylene glycol (MIRALAX) packet 17 g, 17 g, Oral, Daily PRN, Dilia Mccauley PA-C  •  ticagrelor (BRILINTA) tablet 90 mg, 90 mg, Oral, Q12H [address], Renetta Almodovar PA-C, 90 mg at 05/10/23 0859      Labs/Data:        Results from last 7 days   Lab Units 05/10/23  0511 05/09/23  0511 05/08/23  1933   WBC Thousand/uL 5 34 5 70 7 52   HEMOGLOBIN g/dL 11 7 11 2* 12 0   HEMATOCRIT % 35 8 33 7* 35 7   PLATELETS Thousands/uL 236 232 254     Results from last 7 days   Lab Units 05/10/23  0511 05/09/23  0511 05/08/23  1933   POTASSIUM mmol/L 3 7 3 5 3 9   CHLORIDE mmol/L 108 107 104   CO2 mmol/L 21 23 22   BUN mg/dL 9 9 16   CREATININE mg/dL 0 61 0 50* 0 71

## 2023-05-10 NOTE — PLAN OF CARE
Problem: Potential for Falls  Goal: Patient will remain free of falls  Description: INTERVENTIONS:  - Educate patient/family on patient safety including physical limitations  - Instruct patient to call for assistance with activity   - Consult OT/PT to assist with strengthening/mobility   - Keep Call bell within reach  - Keep bed low and locked with side rails adjusted as appropriate  - Keep care items and personal belongings within reach  - Initiate and maintain comfort rounds  - Apply yellow socks and bracelet for high fall risk patients  - Consider moving patient to room near nurses station  5/10/2023 1215 by Matthew Bravo RN  Outcome: Adequate for Discharge  5/10/2023 0837 by Matthew Bravo RN  Outcome: Progressing     Problem: MOBILITY - ADULT  Goal: Maintain or return to baseline ADL function  Description: INTERVENTIONS:  - Educate patient/family on patient safety including physical limitations  - Instruct patient to call for assistance with activity   - Consult OT/PT to assist with strengthening/mobility   - Keep Call bell within reach  - Keep bed low and locked with side rails adjusted as appropriate  - Keep care items and personal belongings within reach  - Initiate and maintain comfort rounds  - Apply yellow socks and bracelet for high fall risk patients  - Consider moving patient to room near nurses station  5/10/2023 1215 by Matthew Bravo RN  Outcome: Adequate for Discharge  5/10/2023 0837 by Matthew Bravo RN  Outcome: Progressing  Goal: Maintains/Returns to pre admission functional level  Description: INTERVENTIONS:  - Perform BMAT or MOVE assessment daily    - Set and communicate daily mobility goal to care team and patient/family/caregiver  - Collaborate with rehabilitation services on mobility goals if consulted  - Perform Range of Motion 3 times a day  - Reposition patient every 3 hours    - Dangle patient 3 times a day  - Stand patient 3 times a day  - Ambulate patient 3 times a day  - Out of bed to chair 3 times a day   - Out of bed for meals 3 times a day  - Out of bed for toileting  - Record patient progress and toleration of activity level   5/10/2023 1215 by Royal Carey RN  Outcome: Adequate for Discharge  5/10/2023 5425 by Royal Carey RN  Outcome: Progressing     Problem: PAIN - ADULT  Goal: Verbalizes/displays adequate comfort level or baseline comfort level  Description: Interventions:  - Encourage patient to monitor pain and request assistance  - Assess pain using appropriate pain scale  - Administer analgesics based on type and severity of pain and evaluate response  - Implement non-pharmacological measures as appropriate and evaluate response  - Consider cultural and social influences on pain and pain management  - Notify physician/advanced practitioner if interventions unsuccessful or patient reports new pain  5/10/2023 1215 by Royal Carey RN  Outcome: Adequate for Discharge  5/10/2023 0837 by Royal Carey RN  Outcome: Progressing     Problem: INFECTION - ADULT  Goal: Absence or prevention of progression during hospitalization  Description: INTERVENTIONS:  - Assess and monitor for signs and symptoms of infection  - Monitor lab/diagnostic results  - Monitor all insertion sites, i e  indwelling lines, tubes, and drains  - Monitor endotracheal if appropriate and nasal secretions for changes in amount and color  - [place] appropriate cooling/warming therapies per order  - Administer medications as ordered  - Instruct and encourage patient and family to use good hand hygiene technique  - Identify and instruct in appropriate isolation precautions for identified infection/condition  5/10/2023 1215 by Royal Carey RN  Outcome: Adequate for Discharge  5/10/2023 0837 by Royal Carey RN  Outcome: Progressing  Goal: Absence of fever/infection during neutropenic period  Description: INTERVENTIONS:  - Monitor WBC    5/10/2023 1215 by Royal Carey RN  Outcome: Adequate for Discharge  5/10/2023 8322 by Helen Archer Maggie Mcnally RN  Outcome: Progressing     Problem: SAFETY ADULT  Goal: Patient will remain free of falls  Description: INTERVENTIONS:  - Educate patient/family on patient safety including physical limitations  - Instruct patient to call for assistance with activity   - Consult OT/PT to assist with strengthening/mobility   - Keep Call bell within reach  - Keep bed low and locked with side rails adjusted as appropriate  - Keep care items and personal belongings within reach  - Initiate and maintain comfort rounds  - Apply yellow socks and bracelet for high fall risk patients  - Consider moving patient to room near nurses station  5/10/2023 1215 by Galdino Mukherjee RN  Outcome: Adequate for Discharge  5/10/2023 0837 by Galidno Mukherjee RN  Outcome: Progressing  Goal: Maintain or return to baseline ADL function  Description: INTERVENTIONS:  - Educate patient/family on patient safety including physical limitations  - Instruct patient to call for assistance with activity   - Consult OT/PT to assist with strengthening/mobility   - Keep Call bell within reach  - Keep bed low and locked with side rails adjusted as appropriate  - Keep care items and personal belongings within reach  - Initiate and maintain comfort rounds  - Apply yellow socks and bracelet for high fall risk patients  - Consider moving patient to room near nurses station  5/10/2023 1215 by Galdino Mukherjee RN  Outcome: Adequate for Discharge  5/10/2023 0837 by Galdino Mukherjee RN  Outcome: Progressing  Goal: Maintains/Returns to pre admission functional level  Description: INTERVENTIONS:  - Perform BMAT or MOVE assessment daily    - Set and communicate daily mobility goal to care team and patient/family/caregiver  - Collaborate with rehabilitation services on mobility goals if consulted  - Perform Range of Motion 3 times a day  - Reposition patient every 3 hours    - Dangle patient 3 times a day  - Stand patient 3 times a day  - Ambulate patient 3 times a day  - Out of bed to chair 3 times a day   - Out of bed for meals 3 times a day  - Out of bed for toileting  - Record patient progress and toleration of activity level   5/10/2023 1215 by Domenica Calvin RN  Outcome: Adequate for Discharge  5/10/2023 4439 by Domenica Calvin RN  Outcome: Progressing     Problem: DISCHARGE PLANNING  Goal: Discharge to home or other facility with appropriate resources  Description: INTERVENTIONS:  - Identify barriers to discharge w/patient and caregiver  - Arrange for needed discharge resources and transportation as appropriate  - Identify discharge learning needs (meds, wound care, etc )  - Arrange for interpretive services to assist at discharge as needed  - Refer to Case Management Department for coordinating discharge planning if the patient needs post-hospital services based on physician/advanced practitioner order or complex needs related to functional status, cognitive ability, or social support system  5/10/2023 1215 by Domenica Calvin RN  Outcome: Adequate for Discharge  5/10/2023 0837 by Domenica Calvin RN  Outcome: Progressing     Problem: Knowledge Deficit  Goal: Patient/family/caregiver demonstrates understanding of disease process, treatment plan, medications, and discharge instructions  Description: Complete learning assessment and assess knowledge base    Interventions:  - Provide teaching at level of understanding  - Provide teaching via preferred learning methods  5/10/2023 1215 by Domenica Calvin RN  Outcome: Adequate for Discharge  5/10/2023 0837 by Domenica Calvin RN  Outcome: Progressing

## 2023-05-10 NOTE — PLAN OF CARE
Problem: Potential for Falls  Goal: Patient will remain free of falls  Description: INTERVENTIONS:  - Educate patient/family on patient safety including physical limitations  - Instruct patient to call for assistance with activity   - Consult OT/PT to assist with strengthening/mobility   - Keep Call bell within reach  - Keep bed low and locked with side rails adjusted as appropriate  - Keep care items and personal belongings within reach  - Initiate and maintain comfort rounds  - Apply yellow socks and bracelet for high fall risk patients  - Consider moving patient to room near nurses station  Outcome: Progressing     Problem: MOBILITY - ADULT  Goal: Maintain or return to baseline ADL function  Description: INTERVENTIONS:  - Educate patient/family on patient safety including physical limitations  - Instruct patient to call for assistance with activity   - Consult OT/PT to assist with strengthening/mobility   - Keep Call bell within reach  - Keep bed low and locked with side rails adjusted as appropriate  - Keep care items and personal belongings within reach  - Initiate and maintain comfort rounds  - Apply yellow socks and bracelet for high fall risk patients  - Consider moving patient to room near nurses station  Outcome: Progressing  Goal: Maintains/Returns to pre admission functional level  Description: INTERVENTIONS:  - Perform BMAT or MOVE assessment daily    - Set and communicate daily mobility goal to care team and patient/family/caregiver  - Collaborate with rehabilitation services on mobility goals if consulted  - Perform Range of Motion 3 times a day  - Reposition patient every 3 hours    - Dangle patient 3 times a day  - Stand patient 3 times a day  - Ambulate patient 3 times a day  - Out of bed to chair 3 times a day   - Out of bed for meals 3 times a day  - Out of bed for toileting  - Record patient progress and toleration of activity level   Outcome: Progressing     Problem: PAIN - ADULT  Goal: Verbalizes/displays adequate comfort level or baseline comfort level  Description: Interventions:  - Encourage patient to monitor pain and request assistance  - Assess pain using appropriate pain scale  - Administer analgesics based on type and severity of pain and evaluate response  - Implement non-pharmacological measures as appropriate and evaluate response  - Consider cultural and social influences on pain and pain management  - Notify physician/advanced practitioner if interventions unsuccessful or patient reports new pain  Outcome: Progressing     Problem: INFECTION - ADULT  Goal: Absence or prevention of progression during hospitalization  Description: INTERVENTIONS:  - Assess and monitor for signs and symptoms of infection  - Monitor lab/diagnostic results  - Monitor all insertion sites, i e  indwelling lines, tubes, and drains  - Monitor endotracheal if appropriate and nasal secretions for changes in amount and color  - [place] appropriate cooling/warming therapies per order  - Administer medications as ordered  - Instruct and encourage patient and family to use good hand hygiene technique  - Identify and instruct in appropriate isolation precautions for identified infection/condition  Outcome: Progressing  Goal: Absence of fever/infection during neutropenic period  Description: INTERVENTIONS:  - Monitor WBC    Outcome: Progressing     Problem: SAFETY ADULT  Goal: Patient will remain free of falls  Description: INTERVENTIONS:  - Educate patient/family on patient safety including physical limitations  - Instruct patient to call for assistance with activity   - Consult OT/PT to assist with strengthening/mobility   - Keep Call bell within reach  - Keep bed low and locked with side rails adjusted as appropriate  - Keep care items and personal belongings within reach  - Initiate and maintain comfort rounds  - Apply yellow socks and bracelet for high fall risk patients  - Consider moving patient to room near nurses station  Outcome: Progressing  Goal: Maintain or return to baseline ADL function  Description: INTERVENTIONS:  - Educate patient/family on patient safety including physical limitations  - Instruct patient to call for assistance with activity   - Consult OT/PT to assist with strengthening/mobility   - Keep Call bell within reach  - Keep bed low and locked with side rails adjusted as appropriate  - Keep care items and personal belongings within reach  - Initiate and maintain comfort rounds  - Apply yellow socks and bracelet for high fall risk patients  - Consider moving patient to room near nurses station  Outcome: Progressing  Goal: Maintains/Returns to pre admission functional level  Description: INTERVENTIONS:  - Perform BMAT or MOVE assessment daily    - Set and communicate daily mobility goal to care team and patient/family/caregiver  - Collaborate with rehabilitation services on mobility goals if consulted  - Perform Range of Motion 3 times a day  - Reposition patient every 3 hours    - Dangle patient 3 times a day  - Stand patient 3 times a day  - Ambulate patient 3 times a day  - Out of bed to chair 3 times a day   - Out of bed for meals 3 times a day  - Out of bed for toileting  - Record patient progress and toleration of activity level   Outcome: Progressing     Problem: DISCHARGE PLANNING  Goal: Discharge to home or other facility with appropriate resources  Description: INTERVENTIONS:  - Identify barriers to discharge w/patient and caregiver  - Arrange for needed discharge resources and transportation as appropriate  - Identify discharge learning needs (meds, wound care, etc )  - Arrange for interpretive services to assist at discharge as needed  - Refer to Case Management Department for coordinating discharge planning if the patient needs post-hospital services based on physician/advanced practitioner order or complex needs related to functional status, cognitive ability, or social support system  Outcome: Progressing     Problem: Knowledge Deficit  Goal: Patient/family/caregiver demonstrates understanding of disease process, treatment plan, medications, and discharge instructions  Description: Complete learning assessment and assess knowledge base    Interventions:  - Provide teaching at level of understanding  - Provide teaching via preferred learning methods  Outcome: Progressing

## 2023-05-10 NOTE — NURSING NOTE
Pt discharged and walked out under own power  Pt showed no signs of distress  Pts questions answered about AVS  Pt took all belongings

## 2023-05-10 NOTE — CASE MANAGEMENT
Case Management Discharge Planning Note    Patient name Debbie Prado  Location [address] [place] [address] /E4 [facility] [facility]-* MRN [id number]  : 1975 Date 5/10/2023       Current Admission Date: 2023  Current Admission Diagnosis:NSTEMI (non-ST elevated myocardial infarction) [facility])   Patient Active Problem List    Diagnosis Date Noted   • NSTEMI (non-ST elevated myocardial infarction) ([facility] ) 2023   • Headache 2023   • Primary hypertension 2023   • Vertigo 2023   • Neck pain 2023   • Right foot pain 2023   • Overweight (BMI 25 0-29 9) 2023   • Gastroesophageal reflux disease without esophagitis 2023   • Neuropathy 2023   • Menopausal syndrome on hormone replacement therapy 10/11/2022   • Hypertriglyceridemia 2022   • Type 2 diabetes mellitus ([facility] ) 2022      LOS (days): 2  Geometric Mean LOS (GMLOS) (days): 2 00  Days to GMLOS:0 4     OBJECTIVE:  Risk of Unplanned Readmission Score: 8 22         Current admission status: Inpatient   Preferred Pharmacy:   [address] [place] [address]  Phone: [phone] Fax: [address] [place] [address]  Phone: [phone] Fax: [phone]    Primary Care Provider: Lino Romero PA-C    Primary Insurance: [employer]  Secondary Insurance: [employer]    DISCHARGE DETAILS:                                 Additional Comments: MD has discharged pt home today  MD wanted co-pay for Brilinta  [address] stated no co-pay with secondary insurance  MD aware of this

## 2023-05-10 NOTE — ASSESSMENT & PLAN NOTE
Lab Results   Component Value Date    HGBA1C 8 0 (H) 05/08/2023       Recent Labs     05/09/23  1542 05/09/23  2101 05/10/23  0803 05/10/23  1116   POCGLU 151* 137 176* 158*       Blood Sugar Average: Last 72 hrs:  (P) 154 [id number]     Type 2 diabetes with most recent hemoglobin A1c 8 9  · Continue Home regimen: Metformin / trulicity  · Follow-up with PCP

## 2023-05-10 NOTE — ASSESSMENT & PLAN NOTE
60-year-old female presented to our institution due to chest pain  Presentation concerning for NSTEMI given troponin elevations  · Cleared by cardiology for discharge   DAPT aspirin, Brillintat  · Continue crestor  · Tricor possibly will be substituted by cardiology on an outpatient basis  · Continue losartan and metoprolol

## 2023-05-10 NOTE — PLAN OF CARE
Problem: Potential for Falls  Goal: Patient will remain free of falls  Description: INTERVENTIONS:  - Educate patient/family on patient safety including physical limitations  - Instruct patient to call for assistance with activity   - Consult OT/PT to assist with strengthening/mobility   - Keep Call bell within reach  - Keep bed low and locked with side rails adjusted as appropriate  - Keep care items and personal belongings within reach  - Initiate and maintain comfort rounds  - Apply yellow socks and bracelet for high fall risk patients  - Consider moving patient to room near nurses station  Outcome: Progressing     Problem: MOBILITY - ADULT  Goal: Maintain or return to baseline ADL function  Description: INTERVENTIONS:  - Educate patient/family on patient safety including physical limitations  - Instruct patient to call for assistance with activity   - Consult OT/PT to assist with strengthening/mobility   - Keep Call bell within reach  - Keep bed low and locked with side rails adjusted as appropriate  - Keep care items and personal belongings within reach  - Initiate and maintain comfort rounds  - Apply yellow socks and bracelet for high fall risk patients  - Consider moving patient to room near nurses station  Outcome: Progressing  Goal: Maintains/Returns to pre admission functional level  Description: INTERVENTIONS:  - Perform BMAT or MOVE assessment daily    - Set and communicate daily mobility goal to care team and patient/family/caregiver     - Collaborate with rehabilitation services on mobility goals if consulted  - Ambulate patient 3 times a day  - Out of bed to chair 3 times a day   - Out of bed for meals 3   Problem: PAIN - ADULT  Goal: Verbalizes/displays adequate comfort level or baseline comfort level  Description: Interventions:  - Encourage patient to monitor pain and request assistance  - Assess pain using appropriate pain scale  - Administer analgesics based on type and severity of pain and evaluate response  - Implement non-pharmacological measures as appropriate and evaluate response  - Consider cultural and social influences on pain and pain management  - Notify physician/advanced practitioner if interventions unsuccessful or patient reports new pain  Outcome: Progressing     Problem: INFECTION - ADULT  Goal: Absence or prevention of progression during hospitalization  Description: INTERVENTIONS:  - Assess and monitor for signs and symptoms of infection  - Monitor lab/diagnostic results  - Monitor all insertion sites, i e  indwelling lines, tubes, and drains  - Monitor endotracheal if appropriate and nasal secretions for changes in amount and color  - [place] appropriate cooling/warming therapies per order  - Administer medications as ordered  - Instruct and encourage patient and family to use good hand hygiene technique  - Identify and instruct in appropriate isolation precautions for identified infection/condition  Outcome: Progressing  Goal: Absence of fever/infection during neutropenic period  Description: INTERVENTIONS:  - Monitor WBC    Outcome: Progressing     Problem: DISCHARGE PLANNING  Goal: Discharge to home or other facility with appropriate resources  Description: INTERVENTIONS:  - Identify barriers to discharge w/patient and caregiver  - Arrange for needed discharge resources and transportation as appropriate  - Identify discharge learning needs (meds, wound care, etc )  - Arrange for interpretive services to assist at discharge as needed  - Refer to Case Management Department for coordinating discharge planning if the patient needs post-hospital services based on physician/advanced practitioner order or complex needs related to functional status, cognitive ability, or social support system  Outcome: Progressing     Problem: Knowledge Deficit  Goal: Patient/family/caregiver demonstrates understanding of disease process, treatment plan, medications, and discharge instructions  Description: Complete learning assessment and assess knowledge base    Interventions:  - Provide teaching at level of understanding  - Provide teaching via preferred learning methods  Outcome: Progressing   times a day  - Out of bed for toileting  - Record patient progress and toleration of activity level   Outcome: Progressing

## 2023-05-10 NOTE — ASSESSMENT & PLAN NOTE
"· Continue fenofibrate  • Per cardiology: \"Long-term patient would benefit by Vascepa rather than Tricor since Mignon Pedersen has controlled study evidence of reduced cardiac events and Tricor does not\"  • To be discussed on an outpatient basis  "

## 2023-05-11 ENCOUNTER — TELEPHONE (OUTPATIENT)
Dept: CARDIOLOGY CLINIC | Facility: CLINIC | Age: 48
End: 2023-05-11

## 2023-05-11 ENCOUNTER — TRANSITIONAL CARE MANAGEMENT (OUTPATIENT)
Dept: FAMILY MEDICINE CLINIC | Facility: CLINIC | Age: 48
End: 2023-05-11

## 2023-05-11 NOTE — UTILIZATION REVIEW
NOTIFICATION OF ADMISSION DISCHARGE   This is a Notification of Discharge from [address]  Please be advised that this patient has been discharge from our facility  Below you will find the admission and discharge date and time including the patient’s disposition  UTILIZATION REVIEW CONTACT:  Ning Polanco  Utilization   Network Utilization Review Department  Phone: [phone] x carefully listen to the prompts  All voicemails are confidential   Email: [email or web address]     ADMISSION INFORMATION  PRESENTATION DATE: 5/8/2023  7:10 PM  OBERVATION ADMISSION DATE:   INPATIENT ADMISSION DATE: 5/8/23 10:02 PM   DISCHARGE DATE: 5/10/2023  2:51 PM   DISPOSITION:Home/Self Care    IMPORTANT INFORMATION:  Send all requests for admission clinical reviews, approved or denied determinations and any other requests to dedicated fax number below belonging to the campus where the patient is receiving treatment   List of dedicated fax numbers:  [address] DENIALS (Administrative/Medical Necessity) [phone]   [address] (Maternity/NICU/Pediatrics) [phone]   [facility] [phone]   [facility] [phone]   [address] [phone]   [address] [facility] [place] [phone] 90 [facility] [phone]   [address] [place] [address] 119 [phone]   [facility]  [phone]   40[facility] [phone]   412 [facility] [address] [facility] [phone]

## 2023-05-12 ENCOUNTER — TRANSITIONAL CARE MANAGEMENT (OUTPATIENT)
Dept: FAMILY MEDICINE CLINIC | Facility: CLINIC | Age: 48
End: 2023-05-12

## 2023-05-18 ENCOUNTER — TELEPHONE (OUTPATIENT)
Dept: FAMILY MEDICINE CLINIC | Facility: CLINIC | Age: 48
End: 2023-05-18

## 2023-05-19 ENCOUNTER — OFFICE VISIT (OUTPATIENT)
Dept: CARDIOLOGY CLINIC | Facility: CLINIC | Age: 48
End: 2023-05-19

## 2023-05-19 VITALS
HEART RATE: 76 BPM | SYSTOLIC BLOOD PRESSURE: 126 MMHG | DIASTOLIC BLOOD PRESSURE: 82 MMHG | WEIGHT: 198.2 LBS | BODY MASS INDEX: 29.27 KG/M2

## 2023-05-19 DIAGNOSIS — Z98.890 STATUS POST CARDIAC CATHETERIZATION: Primary | ICD-10-CM

## 2023-05-19 DIAGNOSIS — E78.1 HYPERTRIGLYCERIDEMIA: ICD-10-CM

## 2023-05-19 DIAGNOSIS — Z98.61 S/P PTCA (PERCUTANEOUS TRANSLUMINAL CORONARY ANGIOPLASTY): ICD-10-CM

## 2023-05-19 RX ORDER — ROSUVASTATIN CALCIUM 20 MG/1
20 TABLET, COATED ORAL DAILY
Qty: 30 TABLET | Refills: 11 | Status: SHIPPED | OUTPATIENT
Start: 2023-05-19 | End: 2023-05-19 | Stop reason: SDUPTHER

## 2023-05-19 RX ORDER — ROSUVASTATIN CALCIUM 20 MG/1
20 TABLET, COATED ORAL DAILY
Qty: 30 TABLET | Refills: 11 | Status: SHIPPED | OUTPATIENT
Start: 2023-05-19 | End: 2024-05-13

## 2023-05-19 NOTE — PROGRESS NOTES
[facility]  [id number] [facility]   [address]  [id number]  Phone#  [phone]   Fax#  [phone]  *-*-*-*-*-*-*-*-*-*-*-*-*-*-*-*-*-*-*-*-*-*-*-*-*-*-*-*-*-*-*-*-*-*-*-*-*-*-*-*-*-*-*-*-*-*-*-*-*-*-*-*-*-*                                              Cardiology Consultation       ENCOUNTER DATE: 23 5:25 PM  PATIENT NAME: Jennifer Waters   : 1975    MRN: [id number]  AGE:47 y o  SEX: female  Zaira Sullivan MD     PRIMARY CARE PHYSICIAN: Donald Mayberry PA-C    ACTIVE DIAGNOSIS THIS VISIT  1  Status post cardiac catheterization  POCT ECG      2  S/P PTCA (percutaneous transluminal coronary angioplasty)  ticagrelor (Brilinta) 90 MG    DISCONTINUED: ticagrelor (Brilinta) 90 MG      3  Hypertriglyceridemia  rosuvastatin (CRESTOR) 20 MG tablet    DISCONTINUED: rosuvastatin (CRESTOR) 20 MG tablet        ACTIVE PROBLEM LIST  Patient Active Problem List   Diagnosis   • Hypertriglyceridemia   • Menopausal syndrome on hormone replacement therapy   • Type 2 diabetes mellitus (HCC)   • Gastroesophageal reflux disease without esophagitis   • Neuropathy   • Right foot pain   • Overweight (BMI 25 0-29  9)   • Vertigo   • Neck pain   • NSTEMI (non-ST elevated myocardial infarction) [facility])   • Headache   • Primary hypertension   • Coronary artery disease involving native coronary artery of native heart with angina pectoris ([facility] [address] )       CARDIOLOGY SPECIALTY COMMENTS  2023-5/10/2023 [facility] patient hospitalized with NSTEMI, peak troponin 213  Other diagnoses include primary hypertension, headache, type 2 diabetes mellitus, hypertriglyceridemia    2023 echocardiogram precardiac catheterization: LVEF 54% with grade 1 diastolic dysfunction  GLS reduced at -12 7% anterior apical, anterior septal and apical, inferior apical, inferior lateral and apical hypokinesis      2023 cardiac catheterization: Proximal LAD before the first diagonal and septal  95% treated with BRUNO 35 mm x 26 mm to 14 sandip with residual 0% lesion  Distal circumflex 80% lesion in a small territory  RCA dominant with proximal 35%  HPI:    As above, patient was hospitalized with a non-ST elevation myocardial infarction with a peak troponin of 213  She underwent cardiac catheterization with stent placement in the proximal LAD before the first septal  and first diagonal branch  He had an excellent result  She was discharged on the following day  According to her after visit summary, she was to start aspirin 81 mg daily metoprolol succinate 25 mg and Brilinta 90 mg every 12 hours  She was to continue from her prehospitalization Trulicity, Cymbalta, vitamin D2, estradiol patch, fenofibrate 160 mg 1 daily, losartan 50 mg daily, metformin 1000 mg twice daily, rosuvastatin 20 mg daily and home blood sugar checks    She was to stop from her prehospitalization Pepcid, Antivert and Mobic    She came to the office with aspirin 81 mg, metoprolol 25 mg and losartan 50 mg daily and stated that they were the only medication she was taking  According to the patient she was not taking Brilinta    She denies chest pain or shortness of breath  She did complain of tingling in her lower extremities which she was told would be addressed at a later date  DISCUSSION/PLAN:         · Because of concern for thrombosis of her LAD, a prescription was sent to [facility] pharmacy across the street at [address] for Brilinta 90 mg tablets  She was also given a card for $5 for her first prescription  She was told to take 2 tablets initially and tomorrow to start 1 tablet every 12 hours  The pharmacy was called to discuss and make sure she got her Brilinta  · She also was given a prescription for rosuvastatin 20 mg daily which according to her she was not taking    A prescription was sent to the pharmacy for rosuvastatin but according to the pharmacy, her medical records showed that she has recently been given rosuvastatin and therefore was not able to obtain it or have it covered by her insurance  · She was told that she would be given a letter to keep her out of work until she was seen again on May 25 at 12:40 PM   However in the confusion I do not think she received a letter  · When she is seen for next appointment, will agree on when she can return to work and give a letter supporting that to the patient  · Patient's last triglycerides were 1091 and she needs to be changed to rosuvastatin and Vascepa in the near future    Lab Studies:    Lab Results   Component Value Date    CHOLESTEROL 268 (H) 05/08/2023    CHOLESTEROL 238 (H) 02/03/2023     Lab Results   Component Value Date    TRIG 1,091 (H) 05/08/2023    TRIG 373 (H) 02/03/2023     Lab Results   Component Value Date    HDL 36 (L) 05/08/2023    HDL 40 (L) 02/03/2023     Lab Results   Component Value Date    [facility]  05/08/2023      Comment:      Calculated LDL invalid, triglycerides >400 mg/dl    LDLCALC 123 02/03/2023       Lab Results   Component Value Date    LDLDIRECT 111 (H) 05/08/2023       Lab Results   Component Value Date    HGBA1C 8 0 (H) 05/08/2023      Lab Results   Component Value Date    EGFR 108 05/10/2023    EGFR 115 05/09/2023    EGFR 101 05/08/2023    SODIUM 137 05/10/2023    SODIUM 138 05/09/2023    SODIUM 136 05/08/2023    K 3 7 05/10/2023    K 3 5 05/09/2023    K 3 9 05/08/2023     05/10/2023     05/09/2023     05/08/2023    CO2 21 05/10/2023    CO2 23 05/09/2023    CO2 22 05/08/2023    BUN 9 05/10/2023    BUN 9 05/09/2023    BUN 16 05/08/2023    CREATININE 0 61 05/10/2023    CREATININE 0 50 (L) 05/09/2023    CREATININE 0 71 05/08/2023     Lab Results   Component Value Date    WBC 5 34 05/10/2023    WBC 5 70 05/09/2023    WBC 7 52 05/08/2023    HGB 11 7 05/10/2023    HGB 11 2 (L) 05/09/2023    HGB 12 0 05/08/2023    HCT 35 8 05/10/2023    HCT 33 7 (L) 05/09/2023    HCT 35 7 05/08/2023    MCV 87 05/10/2023    MCV 88 05/09/2023    MCV 85 05/08/2023    MCH 28 4 05/10/2023    MCH 29 2 05/09/2023    MCH 28 7 05/08/2023    MCHC 32 7 05/10/2023    MCHC 33 2 05/09/2023    MCHC 33 6 05/08/2023     05/10/2023     05/09/2023     05/08/2023        Lab Results   Component Value Date    CALCIUM 8 6 05/10/2023    CALCIUM 8 4 05/09/2023    CALCIUM 9 6 05/08/2023    AST 20 02/03/2023    ALT 17 02/03/2023    ALKPHOS 58 02/03/2023    MG 1 9 05/10/2023    MG 1 9 05/09/2023       Results for orders placed or performed in visit on 05/19/23   POCT ECG    Narrative    Normal sinus rhythm at a rate of 76 bpm   T wave changing in the anterior septal precordial leads V1 through V4 consistent with the residual evolving anterior wall septal myocardial infarction  Abnormal EKG  Current Outpatient Medications:   •  Alcohol Swabs (Pharmacist Choice Alcohol) PADS, Use in the morning, Disp: 100 each, Rfl: 3  •  aspirin 81 mg chewable tablet, Chew 1 tablet (81 mg total) daily Do not start before May 11, 2023 , Disp: 90 tablet, Rfl: 0  •  Blood Glucose Monitoring Suppl (OneTouch Verio Reflect) w/Device KIT, Check blood sugars once daily  Please substitute with appropriate alternative as covered by patient's insurance   Dx: E11 65, Disp: 1 kit, Rfl: 0  •  dulaglutide (Trulicity) 2 01 KE/2 6XN injection, Inject 0 5 mL (0 75 mg total) under the skin every 7 days, Disp: 4 mL, Rfl: 1  •  fenofibrate 160 MG tablet, Take 1 tablet by mouth in the morning, Disp: , Rfl:   •  glucose blood test strip, Use as instructed, Disp: 100 strip, Rfl: 3  •  losartan (COZAAR) 50 mg tablet, Take 1 tablet (50 mg total) by mouth daily, Disp: 90 tablet, Rfl: 0  •  metFORMIN (GLUCOPHAGE) 1000 MG tablet, Take 1 tablet (1,000 mg total) by mouth 2 (two) times a day with meals, Disp: 60 tablet, Rfl: 5  •  metoprolol succinate (TOPROL-XL) 25 mg 24 hr tablet, Take 1 tablet (25 mg total) by mouth daily at bedtime, Disp: 90 tablet, Rfl: 0  • OneTouch Delica Lancets 41B MISC, Check blood sugars once daily  Please substitute with appropriate alternative as covered by patient's insurance   Dx: E11 65, Disp: 100 each, Rfl: 3  •  rosuvastatin (CRESTOR) 20 MG tablet, Take 1 tablet (20 mg total) by mouth daily, Disp: 30 tablet, Rfl: 11  •  ticagrelor (Brilinta) 90 MG, Take 1 tablet (90 mg total) by mouth every 12 (twelve) hours, Disp: 60 tablet, Rfl: 11  •  DULoxetine (CYMBALTA) 30 mg delayed release capsule, TAKE 1 CAPSULE TWICE A DAY BY ORAL ROUTE FOR 30 DAYS  (Patient not taking: Reported on 5/19/2023), Disp: , Rfl:   •  ergocalciferol (VITAMIN D2) 50,000 units, Take 1 capsule (50,000 Units total) by mouth once a week (Patient not taking: Reported on 5/19/2023), Disp: 12 capsule, Rfl: 1  •  estradiol (VIVELLE-DOT) 0 1 MG/24HR, Place 1 patch on the skin 2 (two) times a week, Disp: 8 patch, Rfl: 11  No Known Allergies    Past Medical History:   Diagnosis Date   • Coronary artery disease 05/2023   • Diabetes mellitus (HCC)    • GERD (gastroesophageal reflux disease)    • Hypertension    • NSTEMI (non-ST elevated myocardial infarction) ([facility] ) 05/2023   • Vertigo      Social History     Socioeconomic History   • Marital status: /Civil Union     Spouse name: Not on file   • Number of children: Not on file   • Years of education: Not on file   • Highest education level: Not on file   Occupational History   • Not on file   Tobacco Use   • Smoking status: Never   • Smokeless tobacco: Never   Vaping Use   • Vaping Use: Never used   Substance and Sexual Activity   • Alcohol use: Never   • Drug use: Never   • Sexual activity: Not on file   Other Topics Concern   • Not on file   Social History Narrative   • Not on file     Social Determinants of Health     Financial Resource Strain: Not on file   Food Insecurity: Not on file   Transportation Needs: Not on file   Physical Activity: Not on file   Stress: Not on file   Social Connections: Not on file   Intimate Partner Violence: Not on file   Housing Stability: Not on file      No family history on file  Past Surgical History:   Procedure Laterality Date   • CARDIAC CATHETERIZATION N/A 2023    Procedure: Cardiac catheterization;  Surgeon: Mikki Nunes DO;  Location: [facility]; Service: Cardiology   • CARDIAC CATHETERIZATION N/A 2023    Procedure: Cardiac Coronary Angiogram;  Surgeon: Mikki Nunes DO;  Location: [facility]; Service: Cardiology   • CARDIAC CATHETERIZATION N/A 2023    Procedure: Cardiac pci;  Surgeon: Mikki Nunes DO;  Location: [facility]; Service: Cardiology   •  SECTION       and    • HYSTERECTOMY W/ SALPINGO-OOPHERECTOMY Bilateral     in [address]       Review of Systems:  Review of Systems   Constitutional: Negative  Respiratory: Negative for cough, choking, chest tightness, shortness of breath, wheezing and stridor  Cardiovascular: Negative for chest pain, palpitations and leg swelling  Gastrointestinal: Negative  Endocrine: Negative  Genitourinary: Negative  Musculoskeletal: Negative  Skin: Negative  Allergic/Immunologic: Negative  Neurological: Positive for numbness  Numbness and tingling in her lower extremities  Hematological: Negative  Psychiatric/Behavioral: Negative  Physical Exam:  /82 (BP Location: Left arm, Patient Position: Sitting, Cuff Size: Standard)   Pulse 76   Wt 89 9 kg (198 lb 3 2 oz)   BMI 29 27 kg/m²     PREVIOUS WEIGHTS:   Wt Readings from Last 10 Encounters:   23 89 9 kg (198 lb 3 2 oz)   23 94 8 kg (209 lb)   23 90 kg (198 lb 6 4 oz)   23 91 kg (200 lb 9 6 oz)   23 89 2 kg (196 lb 9 6 oz)      Physical Exam  Constitutional:       General: She is not in acute distress  Appearance: She is well-developed  HENT:      Head: Normocephalic and atraumatic  Neck:      Thyroid: No thyromegaly        Vascular: "No carotid bruit or JVD  Trachea: No tracheal deviation  Cardiovascular:      Rate and Rhythm: Normal rate and regular rhythm  Pulses: Normal pulses  Heart sounds: Normal heart sounds  No murmur heard  No friction rub  No gallop  Pulmonary:      Effort: Pulmonary effort is normal  No respiratory distress  Breath sounds: Normal breath sounds  No wheezing, rhonchi or rales  Chest:      Chest wall: No tenderness  Abdominal:      General: Bowel sounds are normal  There is no distension  Palpations: Abdomen is soft  Tenderness: There is no abdominal tenderness  Musculoskeletal:         General: Normal range of motion  Cervical back: Normal range of motion and neck supple  Right lower leg: No edema  Left lower leg: No edema  Skin:     General: Skin is warm and dry  Neurological:      General: No focal deficit present  Mental Status: She is alert and oriented to person, place, and time  Gait: Gait normal    Psychiatric:         Mood and Affect: Mood normal          Behavior: Behavior normal          Thought Content: Thought content normal          Judgment: Judgment normal        ======================================================   I have personally reviewed pertinent reports  I spent 60 minutes on the patient's office visit  This time was spent on the day of the visit  I had direct contact with the patient in the office on the day of the visit  Greater than 50% of the total time was spent obtaining a history, examining patient, answering all patient questions, arranging and discussing plan of care with patient as well as directly providing instructions  Additional time then spent on orders and office chart  Portions of the record may have been created with voice recognition software  Occasional wrong word or \"sound a like\" substitutions may have occurred due to the inherent limitations of voice recognition software   Read the chart " carefully and recognize, using context, where substitutions have occurred      SIGNATURES:   Elisa Goodman MD

## 2023-05-23 ENCOUNTER — TELEPHONE (OUTPATIENT)
Dept: FAMILY MEDICINE CLINIC | Facility: CLINIC | Age: 48
End: 2023-05-23

## 2023-05-25 ENCOUNTER — OFFICE VISIT (OUTPATIENT)
Dept: CARDIOLOGY CLINIC | Facility: CLINIC | Age: 48
End: 2023-05-25

## 2023-05-25 VITALS
HEIGHT: 69 IN | WEIGHT: 197.2 LBS | HEART RATE: 78 BPM | SYSTOLIC BLOOD PRESSURE: 112 MMHG | DIASTOLIC BLOOD PRESSURE: 68 MMHG | BODY MASS INDEX: 29.21 KG/M2

## 2023-05-25 DIAGNOSIS — E78.1 HYPERTRIGLYCERIDEMIA: ICD-10-CM

## 2023-05-25 DIAGNOSIS — E11.00 TYPE 2 DIABETES MELLITUS WITH HYPEROSMOLARITY WITHOUT COMA, UNSPECIFIED WHETHER LONG TERM INSULIN USE (HCC): ICD-10-CM

## 2023-05-25 DIAGNOSIS — Z95.5 HISTORY OF HEART ARTERY STENT: ICD-10-CM

## 2023-05-25 DIAGNOSIS — I25.2 HISTORY OF NON-ST ELEVATION MYOCARDIAL INFARCTION (NSTEMI): ICD-10-CM

## 2023-05-25 DIAGNOSIS — I25.10 CORONARY ARTERY DISEASE INVOLVING NATIVE CORONARY ARTERY OF NATIVE HEART WITHOUT ANGINA PECTORIS: Primary | ICD-10-CM

## 2023-05-25 DIAGNOSIS — I10 PRIMARY HYPERTENSION: ICD-10-CM

## 2023-05-25 PROBLEM — I21.4 NSTEMI (NON-ST ELEVATED MYOCARDIAL INFARCTION) (HCC): Status: RESOLVED | Noted: 2023-05-08 | Resolved: 2023-05-25

## 2023-05-25 NOTE — PROGRESS NOTES
[facility]  [address] [facility]   [facility]  [address]  Phone#  [phone]   Fax#  [phone]  *-*-*-*-*-*-*-*-*-*-*-*-*-*-*-*-*-*-*-*-*-*-*-*-*-*-*-*-*-*-*-*-*-*-*-*-*-*-*-*-*-*-*-*-*-*-*-*-*-*-*-*-*-*                                   Cardiology Follow Up      ENCOUNTER DATE: 23 5:31 PM  PATIENT NAME: Harshil Landaverde   : 1975    MRN: [id number]  AGE:47 y o  SEX: female  [address] Bruno Sol MD     PRIMARY CARE PHYSICIAN: Maya Ang PA-C    ACTIVE DIAGNOSIS THIS VISIT  1  Coronary artery disease involving native coronary artery of native heart without angina pectoris        2  Hypertriglyceridemia        3  History of non-ST elevation myocardial infarction (NSTEMI)        4  History of proximal LAD stent        5  Primary hypertension        6  Type 2 diabetes mellitus with hyperosmolarity without coma, unspecified whether long term insulin use ([facility] )          ACTIVE PROBLEM LIST  Patient Active Problem List   Diagnosis   • Hypertriglyceridemia   • Menopausal syndrome on hormone replacement therapy   • Type 2 diabetes mellitus (HCC)   • Gastroesophageal reflux disease without esophagitis   • Neuropathy   • Right foot pain   • Overweight (BMI 25 0-29  9)   • Vertigo   • Neck pain   • Headache   • Primary hypertension   • Coronary artery disease involving native coronary artery of native heart without angina pectoris   • History of non-ST elevation myocardial infarction (NSTEMI)   • History of proximal LAD stent       CARDIOLOGY SPECIALTY COMMENTS  2023-5/10/2023 [facility] patient hospitalized with NSTEMI, peak troponin 213  Other diagnoses include primary hypertension, headache, type 2 diabetes mellitus, hypertriglyceridemia    2023 echocardiogram precardiac catheterization: LVEF 54% with grade 1 diastolic dysfunction    GLS reduced at -12 7% anterior apical, anterior septal and apical, inferior apical, inferior lateral and apical hypokinesis  5/9/2023 cardiac catheterization: Proximal LAD before the first diagonal and septal  95% treated with BRUNO 35 mm x 26 mm to 14 sandip with residual 0% lesion  Distal circumflex 80% lesion in a small territory  RCA dominant with proximal 35%  INTERVAL HISTORY:      [employer]  was used for communication with the patient  We also talked with the  who accompanies her from a local [employer]  Patient denies cardiac symptoms  Patient denies chest discomfort or shortness of breath  Patient has no palpitations  Patient denies symptoms of dizziness, lightheadedness or near-syncope/syncope  Patient denies leg edema  Patient denies symptoms of orthopnea or paroxysmal nocturnal dyspnea  She is back on Brilinta taking 90 mg every 12 hours  She is taking aspirin 81 mg daily  She is taking rosuvastatin 20 mg daily    DISCUSSION/PLAN:          · We had a discussion the importance of not stopping the aspirin or Brilinta unless a cardiologist tells her to do so  Also that she needed to ensure the cardiologist knows that she had a recent stent placed  Told her that Brilinta and aspirin will be for minimum 1 year  · Return in 2 months      Lab Studies:    Lab Results   Component Value Date    CHOLESTEROL 268 (H) 05/08/2023    CHOLESTEROL 238 (H) 02/03/2023     Lab Results   Component Value Date    TRIG 1,091 (H) 05/08/2023    TRIG 373 (H) 02/03/2023     Lab Results   Component Value Date    HDL 36 (L) 05/08/2023    HDL 40 (L) 02/03/2023     Lab Results   Component Value Date    LDLCALC  05/08/2023      Comment:      Calculated LDL invalid, triglycerides >400 mg/dl    LDLCALC 123 02/03/2023     Lab Results   Component Value Date    LDLDIRECT 111 (H) 05/08/2023     Lab Results   Component Value Date    HGBA1C 8 0 (H) 05/08/2023      Lab Results   Component Value Date    BUN 9 05/10/2023    BUN 9 05/09/2023    BUN 16 05/08/2023     05/10/2023     05/09/2023     05/08/2023    CO2 21 05/10/2023    CO2 23 05/09/2023    CO2 22 05/08/2023    CREATININE 0 61 05/10/2023    CREATININE 0 50 (L) 05/09/2023    CREATININE 0 71 05/08/2023    EGFR 108 05/10/2023    EGFR 115 05/09/2023    EGFR 101 05/08/2023    K 3 7 05/10/2023    K 3 5 05/09/2023    K 3 9 05/08/2023    SODIUM 137 05/10/2023    SODIUM 138 05/09/2023    SODIUM 136 05/08/2023     Lab Results   Component Value Date    HCT 35 8 05/10/2023    HCT 33 7 (L) 05/09/2023    HCT 35 7 05/08/2023    HGB 11 7 05/10/2023    HGB 11 2 (L) 05/09/2023    HGB 12 0 05/08/2023    MCH 28 4 05/10/2023    MCH 29 2 05/09/2023    MCH 28 7 05/08/2023    MCHC 32 7 05/10/2023    MCHC 33 2 05/09/2023    MCHC 33 6 05/08/2023    MCV 87 05/10/2023    MCV 88 05/09/2023    MCV 85 05/08/2023     05/10/2023     05/09/2023     05/08/2023    WBC 5 34 05/10/2023    WBC 5 70 05/09/2023    WBC 7 52 05/08/2023      Lab Results   Component Value Date    ALKPHOS 58 02/03/2023    ALT 17 02/03/2023    AST 20 02/03/2023    CALCIUM 8 6 05/10/2023    CALCIUM 8 4 05/09/2023    CALCIUM 9 6 05/08/2023    MG 1 9 05/10/2023    MG 1 9 05/09/2023     No results found for this visit on 05/25/23  Current Outpatient Medications:   •  Alcohol Swabs (Pharmacist Choice Alcohol) PADS, Use in the morning, Disp: 100 each, Rfl: 3  •  aspirin 81 mg chewable tablet, Chew 1 tablet (81 mg total) daily Do not start before May 11, 2023 , Disp: 90 tablet, Rfl: 0  •  Blood Glucose Monitoring Suppl (OneTouch Verio Reflect) w/Device KIT, Check blood sugars once daily  Please substitute with appropriate alternative as covered by patient's insurance   Dx: E11 65, Disp: 1 kit, Rfl: 0  •  DULoxetine (CYMBALTA) 30 mg delayed release capsule, , Disp: , Rfl:   •  ergocalciferol (VITAMIN D2) 50,000 units, Take 1 capsule (50,000 Units total) by mouth once a week, Disp: 12 capsule, Rfl: 1  •  fenofibrate 160 MG tablet, Take 1 tablet by mouth in the morning, Disp: , Rfl:   •  glucose blood test strip, Use as instructed, Disp: 100 strip, Rfl: 3  •  losartan (COZAAR) 50 mg tablet, Take 1 tablet (50 mg total) by mouth daily, Disp: 90 tablet, Rfl: 0  •  metFORMIN (GLUCOPHAGE) 1000 MG tablet, Take 1 tablet (1,000 mg total) by mouth 2 (two) times a day with meals, Disp: 60 tablet, Rfl: 5  •  metoprolol succinate (TOPROL-XL) 25 mg 24 hr tablet, Take 1 tablet (25 mg total) by mouth daily at bedtime, Disp: 90 tablet, Rfl: 0  •  OneTouch Delica Lancets 69N MISC, Check blood sugars once daily  Please substitute with appropriate alternative as covered by patient's insurance   Dx: E11 65, Disp: 100 each, Rfl: 3  •  rosuvastatin (CRESTOR) 20 MG tablet, Take 1 tablet (20 mg total) by mouth daily, Disp: 30 tablet, Rfl: 11  •  ticagrelor (Brilinta) 90 MG, Take 1 tablet (90 mg total) by mouth every 12 (twelve) hours, Disp: 60 tablet, Rfl: 11  •  dulaglutide (Trulicity) 4 55 EK/5 2ST injection, Inject 0 5 mL (0 75 mg total) under the skin every 7 days, Disp: 4 mL, Rfl: 1  •  estradiol (VIVELLE-DOT) 0 1 MG/24HR, Place 1 patch on the skin 2 (two) times a week, Disp: 8 patch, Rfl: 11  No Known Allergies    Past Medical History:   Diagnosis Date   • Coronary artery disease 05/2023   • Diabetes mellitus (HCC)    • GERD (gastroesophageal reflux disease)    • Hypertension    • NSTEMI (non-ST elevated myocardial infarction) ([facility] ) 05/2023   • Vertigo      Social History     Socioeconomic History   • Marital status: /Civil Union     Spouse name: Not on file   • Number of children: Not on file   • Years of education: Not on file   • Highest education level: Not on file   Occupational History   • Not on file   Tobacco Use   • Smoking status: Never   • Smokeless tobacco: Never   Vaping Use   • Vaping Use: Never used   Substance and Sexual Activity   • Alcohol use: Never   • Drug use: Never   • Sexual activity: Not on file   Other Topics Concern   • Not on file   Social History Narrative   • Not on file     Social "Determinants of Health     Financial Resource Strain: Not on file   Food Insecurity: Not on file   Transportation Needs: Not on file   Physical Activity: Not on file   Stress: Not on file   Social Connections: Not on file   Intimate Partner Violence: Not on file   Housing Stability: Not on file      History reviewed  No pertinent family history  Past Surgical History:   Procedure Laterality Date   • CARDIAC CATHETERIZATION N/A 2023    Procedure: Cardiac catheterization;  Surgeon: Anibal Shahid DO;  Location: [facility] CARD[facility]; Service: Cardiology   • CARDIAC CATHETERIZATION N/A 2023    Procedure: Cardiac Coronary Angiogram;  Surgeon: Anibal Shahid DO;  Location: [facility]ARD[facility]; Service: Cardiology   • CARDIAC CATHETERIZATION N/A 2023    Procedure: Cardiac pci;  Surgeon: Anibal Shahid DO;  Location: [facility]ARDI[facility]; Service: Cardiology   •  SECTION       and    • HYSTERECTOMY W/ SALPINGO-OOPHERECTOMY Bilateral     in [address]       PREVIOUS WEIGHTS:   Wt Readings from Last 10 Encounters:   23 89 4 kg (197 lb 3 2 oz)   23 89 9 kg (198 lb 3 2 oz)   23 94 8 kg (209 lb)   23 90 kg (198 lb 6 4 oz)   23 91 kg (200 lb 9 6 oz)   23 89 2 kg (196 lb 9 6 oz)        Review of Systems:  Review of Systems   Respiratory: Negative for cough, choking, chest tightness, shortness of breath and wheezing  Cardiovascular: Negative for chest pain, palpitations and leg swelling  Musculoskeletal: Negative for gait problem  Skin: Negative for rash  Neurological: Negative for dizziness, tremors, syncope, weakness, light-headedness, numbness and headaches  Psychiatric/Behavioral: Negative for agitation and behavioral problems  The patient is not hyperactive          Physical Exam:  /68 (BP Location: Right arm, Patient Position: Sitting, Cuff Size: Adult)   Pulse 78   Ht 5' 9\" (1 753 m)   Wt 89 4 kg (197 lb 3 2 oz)   " BMI 29 12 kg/m²     Physical Exam  Constitutional:       General: She is not in acute distress  Appearance: She is well-developed  HENT:      Head: Normocephalic and atraumatic  Neck:      Thyroid: No thyromegaly  Vascular: No carotid bruit or JVD  Trachea: No tracheal deviation  Cardiovascular:      Rate and Rhythm: Normal rate and regular rhythm  Pulses: Normal pulses  Heart sounds: Normal heart sounds  No murmur heard  No friction rub  No gallop  Pulmonary:      Effort: Pulmonary effort is normal  No respiratory distress  Breath sounds: Normal breath sounds  No wheezing, rhonchi or rales  Chest:      Chest wall: No tenderness  Abdominal:      General: Bowel sounds are normal  There is no distension  Palpations: Abdomen is soft  Tenderness: There is no abdominal tenderness  Musculoskeletal:         General: Normal range of motion  Cervical back: Normal range of motion and neck supple  Right lower leg: No edema  Left lower leg: No edema  Skin:     General: Skin is warm and dry  Neurological:      General: No focal deficit present  Mental Status: She is alert and oriented to person, place, and time  Gait: Gait normal    Psychiatric:         Mood and Affect: Mood normal          Behavior: Behavior normal          Thought Content: Thought content normal          Judgment: Judgment normal          ======================================================  Imaging:   I have personally reviewed pertinent reports  I spent 30 minutes on the patient's office visit  This time was spent on the day of the visit  I had direct contact with the patient in the office on the day of the visit  Greater than 50% of the total time was spent obtaining a history, examining patient, answering all patient questions, arranging and discussing plan of care with patient as well as directly providing instructions    Additional time then spent on orders and "office chart  Portions of the record may have been created with voice recognition software  Occasional wrong word or \"sound a like\" substitutions may have occurred due to the inherent limitations of voice recognition software  Read the chart carefully and recognize, using context, where substitutions have occurred      SIGNATURES:   Gil Rojas MD   "

## 2023-06-06 ENCOUNTER — OFFICE VISIT (OUTPATIENT)
Dept: FAMILY MEDICINE CLINIC | Facility: CLINIC | Age: 48
End: 2023-06-06
Payer: COMMERCIAL

## 2023-06-06 VITALS
OXYGEN SATURATION: 96 % | BODY MASS INDEX: 30.36 KG/M2 | WEIGHT: 205 LBS | DIASTOLIC BLOOD PRESSURE: 72 MMHG | RESPIRATION RATE: 20 BRPM | HEART RATE: 72 BPM | SYSTOLIC BLOOD PRESSURE: 123 MMHG | TEMPERATURE: 97.3 F | HEIGHT: 69 IN

## 2023-06-06 DIAGNOSIS — R42 VERTIGO: ICD-10-CM

## 2023-06-06 DIAGNOSIS — I25.2 HISTORY OF NON-ST ELEVATION MYOCARDIAL INFARCTION (NSTEMI): Primary | ICD-10-CM

## 2023-06-06 DIAGNOSIS — E78.1 HYPERTRIGLYCERIDEMIA: ICD-10-CM

## 2023-06-06 DIAGNOSIS — E11.9 TYPE 2 DIABETES MELLITUS WITHOUT COMPLICATION, WITHOUT LONG-TERM CURRENT USE OF INSULIN (HCC): ICD-10-CM

## 2023-06-06 DIAGNOSIS — K21.9 GASTROESOPHAGEAL REFLUX DISEASE WITHOUT ESOPHAGITIS: ICD-10-CM

## 2023-06-06 DIAGNOSIS — Z79.890 MENOPAUSAL SYNDROME ON HORMONE REPLACEMENT THERAPY: ICD-10-CM

## 2023-06-06 DIAGNOSIS — N95.1 MENOPAUSAL SYNDROME ON HORMONE REPLACEMENT THERAPY: ICD-10-CM

## 2023-06-06 DIAGNOSIS — I25.10 CORONARY ARTERY DISEASE INVOLVING NATIVE CORONARY ARTERY OF NATIVE HEART WITHOUT ANGINA PECTORIS: ICD-10-CM

## 2023-06-06 PROCEDURE — 99214 OFFICE O/P EST MOD 30 MIN: CPT | Performed by: PHYSICIAN ASSISTANT

## 2023-06-06 RX ORDER — FAMOTIDINE 20 MG/1
20 TABLET, FILM COATED ORAL 2 TIMES DAILY PRN
COMMUNITY
Start: 2023-06-05 | End: 2023-06-06 | Stop reason: SDUPTHER

## 2023-06-06 RX ORDER — FAMOTIDINE 20 MG/1
20 TABLET, FILM COATED ORAL 2 TIMES DAILY PRN
Qty: 60 TABLET | Refills: 5 | Status: SHIPPED | OUTPATIENT
Start: 2023-06-06

## 2023-06-06 RX ORDER — MECLIZINE HCL 12.5 MG/1
12.5 TABLET ORAL EVERY 8 HOURS SCHEDULED
Qty: 30 TABLET | Refills: 1 | Status: SHIPPED | OUTPATIENT
Start: 2023-06-06

## 2023-06-06 NOTE — ASSESSMENT & PLAN NOTE
Not well-controlled but improved since starting Trulicity  Continue same dose once weekly, caution with risk of pancreatitis with known high triglycerides  Continue max dose Metformin  Gave samples for Jardiance 10mg today x 2 weeks, LOT [id number] EXP May 2025  Follow-up in 2 weeks with blood sugar trend and send to pharmacy if tolerated     Lab Results   Component Value Date    HGBA1C 8 0 (H) 05/08/2023

## 2023-06-06 NOTE — PROGRESS NOTES
Name: Jacki Pinzon      : 1975      MRN: [id number]  Encounter Provider: Lilian Valenzuela PA-C  Encounter Date: 2023   Encounter department: [facility]     1  History of non-ST elevation myocardial infarction (NSTEMI)  Assessment & Plan:  Reviewed hospital admission and discharge from  to 5/10  Discussed risk factors including uncontrolled mixed HLD and use of estrogen and uncontrolled diabetes  Strong family hx of cardiac death with loss of her brother last year age 62s  Continue DAPT with stent placed  2  Coronary artery disease involving native coronary artery of native heart without angina pectoris  Assessment & Plan: With hx of stent due to 95% occlusion of LAD sudden unexpected NSTEMI in 2023, reviewed recent hospitalization notes from 2023  Reviewed cardiac cath and most recent cardiology consult note in 2023  Continue DAPT x 1 year and rosuvastatin  Consider titrate dose and add Vascepa  Discussed risk of estrogen with hx of NSTEMI, discontinue today and referred to GYN  3  Type 2 diabetes mellitus without complication, without long-term current use of insulin ([facility] )  Assessment & Plan:  Not well-controlled but improved since starting Trulicity  Continue same dose once weekly, caution with risk of pancreatitis with known high triglycerides  Continue max dose Metformin  Gave samples for Jardiance 10mg today x 2 weeks, LOT [id number] EXP May 2025  Follow-up in 2 weeks with blood sugar trend and send to pharmacy if tolerated  Lab Results   Component Value Date    HGBA1C 8 0 (H) 2023       Orders:  -     dulaglutide (Trulicity) 6 97 MR/2 2DW injection; Inject 0 5 mL (0 75 mg total) under the skin every 7 days  -     Lipid panel; Future; Expected date: 2023  -     HEMOGLOBIN A1C W/ EAG ESTIMATION; Future; Expected date: 2023  -     Comprehensive metabolic panel;  Future; Expected date: 07/06/2023  -     CBC and differential; Future; Expected date: 07/06/2023    4  Menopausal syndrome on hormone replacement therapy  Assessment & Plan:  History of total hysterectomy in 2011  Previously controlled hot flashes on estrogen patches but brand was changed and not as helpful  Will discontinue entirely today due to risk with recent NSTEMI  Failed OTC [employer] supplement, will trial black cohosh instead and consider switch Cymbalta to Venlafaxine  Referred to GYN as patient did not schedule as previously recommended  Orders:  -     Black Cohosh Root 450 MG CAPS; Take 1 capsule (450 mg total) by mouth in the morning  -     Ambulatory Referral to Gynecology; Future    5  Gastroesophageal reflux disease without esophagitis  Assessment & Plan:  Refilled famotidine as needed, continue avoid dietary triggers  Orders:  -     famotidine (PEPCID) 20 mg tablet; Take 1 tablet (20 mg total) by mouth 2 (two) times a day as needed for indigestion or heartburn    6  Vertigo  Assessment & Plan:  Improved with meclizine, continue as needed  Orders:  -     meclizine (ANTIVERT) 12 5 MG tablet; Take 1 tablet (12 5 mg total) by mouth every 8 (eight) hours    7  Hypertriglyceridemia  Assessment & Plan:  Lab Results   Component Value Date    CHOLESTEROL 268 (H) 05/08/2023    CHOLESTEROL 238 (H) 02/03/2023     Lab Results   Component Value Date    HDL 36 (L) 05/08/2023    HDL 40 (L) 02/03/2023     Lab Results   Component Value Date    TRIG 1,091 (H) 05/08/2023    TRIG 373 (H) 02/03/2023     Lab Results   Component Value Date    [place] [address] 02/03/2023     Very high trigs, previously on atorvastatin and fenofibrate  Switched to rosuvastatin and consider add Vascepa, follow-up with cardiology at the end of the month  Discussed importance of avoiding carbs and maintaining low fat diet  Orders:  -     Lipid panel;  Future; Expected date: 07/06/2023           Mohsen Jiménez is a 52 y o  female with a h/o diabetes, HLD, HTN, menopause who presents as a same day walk-in patient after recent NSTEMI s/p stent for CAD  Her estrogen patches are round instead of previously rectangular shape and aren't working as well  She has intolerable hot flashes mainly at night  She also has been having episodes of positional dizziness worse with rapid head movements which resolved previously with meclizine but was discontinued with recent hospitalization  She just returned to work today and did well, no chest pain or SOB  Random bruising in lower legs  Taking DAPT  Her sugars have been ranging in the 130s in the mornings without eating for the first 3 days after Trulicity injection  The next few days she has 160s-170s  Not sure how her triglycerides rapidly increased  She has not noticed a difference on Cymbalta for neuropathy and has no history of depression  Famotidine was stopped during recently hospitalization as well and that was helping as needed for acid reflux  No smoking or alcohol use  History was conducted in English without the use of   Review of Systems   Constitutional: Negative for fever and unexpected weight change  Respiratory: Negative for shortness of breath  Cardiovascular: Negative for chest pain, palpitations and leg swelling  Skin: Positive for color change (bruising)  Neurological: Positive for dizziness  Negative for light-headedness and headaches  Current Outpatient Medications on File Prior to Visit   Medication Sig   • Alcohol Swabs (Pharmacist Choice Alcohol) PADS Use in the morning   • aspirin 81 mg chewable tablet Chew 1 tablet (81 mg total) daily Do not start before May 11, 2023  • Blood Glucose Monitoring Suppl (OneTouch Verio Reflect) w/Device KIT Check blood sugars once daily  Please substitute with appropriate alternative as covered by patient's insurance   Dx: E11 65   • DULoxetine (CYMBALTA) 30 mg delayed release capsule    • Empagliflozin (JARDIANCE) 10 MG TABS tablet Take 10 "mg by mouth every morning   • ergocalciferol (VITAMIN D2) 50,000 units Take 1 capsule (50,000 Units total) by mouth once a week   • fenofibrate 160 MG tablet Take 1 tablet by mouth in the morning   • glucose blood test strip Use as instructed   • losartan (COZAAR) 50 mg tablet Take 1 tablet (50 mg total) by mouth daily   • metFORMIN (GLUCOPHAGE) 1000 MG tablet Take 1 tablet (1,000 mg total) by mouth 2 (two) times a day with meals   • metoprolol succinate (TOPROL-XL) 25 mg 24 hr tablet Take 1 tablet (25 mg total) by mouth daily at bedtime   • OneTouch Delica Lancets 13N MISC Check blood sugars once daily  Please substitute with appropriate alternative as covered by patient's insurance  Dx: E11 65   • rosuvastatin (CRESTOR) 20 MG tablet Take 1 tablet (20 mg total) by mouth daily   • ticagrelor (Brilinta) 90 MG Take 1 tablet (90 mg total) by mouth every 12 (twelve) hours   • [DISCONTINUED] dulaglutide (Trulicity) 5 98 OH/7 2HI injection Inject 0 5 mL (0 75 mg total) under the skin every 7 days   • [DISCONTINUED] estradiol (VIVELLE-DOT) 0 1 MG/24HR Place 1 patch on the skin 2 (two) times a week   • [DISCONTINUED] famotidine (PEPCID) 20 mg tablet Take 20 mg by mouth 2 (two) times a day as needed       Objective     /72 (BP Location: Left arm, Patient Position: Sitting, Cuff Size: Standard)   Pulse 72   Temp (!) 97 3 °F (36 3 °C) (Temporal)   Resp 20   Ht 5' 9\" (1 753 m)   Wt 93 kg (205 lb)   SpO2 96%   BMI 30 27 kg/m²     Physical Exam  Vitals and nursing note reviewed  Constitutional:       Appearance: Normal appearance  HENT:      Head: Normocephalic and atraumatic  Cardiovascular:      Rate and Rhythm: Normal rate and regular rhythm  Pulses: Normal pulses  Heart sounds: Normal heart sounds  Pulmonary:      Effort: Pulmonary effort is normal       Breath sounds: Normal breath sounds  Neurological:      Mental Status: She is alert and oriented to person, place, and time   Mental status is " at baseline         Sven Aguilera PA-C

## 2023-06-07 ENCOUNTER — TELEPHONE (OUTPATIENT)
Dept: FAMILY MEDICINE CLINIC | Facility: CLINIC | Age: 48
End: 2023-06-07

## 2023-06-07 NOTE — ASSESSMENT & PLAN NOTE
History of total hysterectomy in 2011  Previously controlled hot flashes on estrogen patches but brand was changed and not as helpful  Will discontinue entirely today due to risk with recent NSTEMI  Failed OTC [employer] supplement, will trial black cohosh instead and consider switch Cymbalta to Venlafaxine  Referred to GYN as patient did not schedule as previously recommended

## 2023-06-07 NOTE — ASSESSMENT & PLAN NOTE
Lab Results   Component Value Date    CHOLESTEROL 268 (H) 05/08/2023    CHOLESTEROL 238 (H) 02/03/2023     Lab Results   Component Value Date    HDL 36 (L) 05/08/2023    HDL 40 (L) 02/03/2023     Lab Results   Component Value Date    TRIG 1,091 (H) 05/08/2023    TRIG 373 (H) 02/03/2023     Lab Results   Component Value Date    [place] 198 02/03/2023     Very high trigs, previously on atorvastatin and fenofibrate  Switched to rosuvastatin and consider add Vascepa, follow-up with cardiology at the end of the month  Discussed importance of avoiding carbs and maintaining low fat diet

## 2023-06-07 NOTE — ASSESSMENT & PLAN NOTE
Reviewed hospital admission and discharge from 5/8 to 5/10  Discussed risk factors including uncontrolled mixed HLD and use of estrogen and uncontrolled diabetes  Strong family hx of cardiac death with loss of her brother last year age 62s  Continue DAPT with stent placed

## 2023-06-07 NOTE — ASSESSMENT & PLAN NOTE
With hx of stent due to 95% occlusion of LAD sudden unexpected NSTEMI in 5/2023, reviewed recent hospitalization notes from 5/2023  Reviewed cardiac cath and most recent cardiology consult note in 5/2023  Continue DAPT x 1 year and rosuvastatin  Consider titrate dose and add Vascepa  Discussed risk of estrogen with hx of NSTEMI, discontinue today and referred to GYN

## 2023-06-09 ENCOUNTER — TELEPHONE (OUTPATIENT)
Dept: FAMILY MEDICINE CLINIC | Facility: CLINIC | Age: 48
End: 2023-06-09

## 2023-06-09 DIAGNOSIS — E11.9 TYPE 2 DIABETES MELLITUS WITHOUT COMPLICATION, UNSPECIFIED WHETHER LONG TERM INSULIN USE (HCC): Primary | ICD-10-CM

## 2023-06-09 NOTE — TELEPHONE ENCOUNTER
Patient came to office stating insurance won't cover black cohosh, recommended patient purchase over the counter   She is embarrassed at work due sweat, also with low libido and not interested in having marital relations in past 2 years which did not improve with patches, has not scheduled with gyn yet, will discuss with cardiology the estrogen patches and risk v benefit

## 2023-06-23 ENCOUNTER — TELEPHONE (OUTPATIENT)
Dept: CARDIOLOGY CLINIC | Facility: CLINIC | Age: 48
End: 2023-06-23

## 2023-06-23 NOTE — TELEPHONE ENCOUNTER
Patient stopped in office stating has no refills on her Brilinta  Phone call to [facility]  They state Brinita script patient brought to office was written by a hospital physician Dr Ivanna Ferreira and discontinued by Dr Ivanna Ferreira   They have no record of Script written by Dr Wilton Moreno 5/19/23  Requested script be reordered   Brilinta 90mg #60  One q 12 hours  x11    Reminded patient of July 19 appointment

## 2023-07-10 ENCOUNTER — HOSPITAL ENCOUNTER (EMERGENCY)
Facility: HOSPITAL | Age: 48
Discharge: HOME | End: 2023-07-10
Attending: EMERGENCY MEDICINE
Payer: OTHER MISCELLANEOUS

## 2023-07-10 VITALS
WEIGHT: 196 LBS | TEMPERATURE: 97.3 F | HEART RATE: 72 BPM | OXYGEN SATURATION: 99 % | SYSTOLIC BLOOD PRESSURE: 105 MMHG | BODY MASS INDEX: 28.94 KG/M2 | RESPIRATION RATE: 20 BRPM | DIASTOLIC BLOOD PRESSURE: 70 MMHG

## 2023-07-10 DIAGNOSIS — S61.210A LACERATION OF RIGHT INDEX FINGER, FOREIGN BODY PRESENCE UNSPECIFIED, NAIL DAMAGE STATUS UNSPECIFIED, INITIAL ENCOUNTER: Primary | ICD-10-CM

## 2023-07-10 PROCEDURE — 90715 TDAP VACCINE 7 YRS/> IM: CPT

## 2023-07-10 RX ORDER — LIDOCAINE HYDROCHLORIDE 10 MG/ML
5 INJECTION, SOLUTION EPIDURAL; INFILTRATION; INTRACAUDAL; PERINEURAL ONCE
Status: COMPLETED | OUTPATIENT
Start: 2023-07-10 | End: 2023-07-10

## 2023-07-10 RX ADMIN — TETANUS TOXOID, REDUCED DIPHTHERIA TOXOID AND ACELLULAR PERTUSSIS VACCINE, ADSORBED 0.5 ML: 5; 2.5; 8; 8; 2.5 SUSPENSION INTRAMUSCULAR at 12:58

## 2023-07-10 RX ADMIN — LIDOCAINE HYDROCHLORIDE 5 ML: 10 INJECTION, SOLUTION EPIDURAL; INFILTRATION; INTRACAUDAL; PERINEURAL at 12:58

## 2023-07-10 NOTE — ED PROVIDER NOTES
History  Chief Complaint   Patient presents with   • Finger Laceration     R  index finger laceration after cutting if with a knife up in the cafe. Patient is a 60-year-old female coming in for evaluation of laceration to her right index finger while working. Is in no acute distress, not sure when her last tetanus was. Normal sensation, normal RoM      Finger Laceration  Location:  Finger  Finger laceration location:  R index finger  Length:  2cm  Depth: Through dermis  Time since incident:  30 minutes  Laceration mechanism:  Knife  Foreign body present:  No foreign bodies  Tetanus status:  Unknown  Associated symptoms: no numbness and no redness        Prior to Admission Medications   Prescriptions Last Dose Informant Patient Reported? Taking? Alcohol Swabs (Pharmacist Choice Alcohol) PADS   No No   Sig: Use in the morning   Black Cohosh Root 450 MG CAPS   No No   Sig: Take 1 capsule (450 mg total) by mouth in the morning   Blood Glucose Monitoring Suppl (OneTouch Verio Reflect) w/Device KIT   No No   Sig: Check blood sugars once daily. Please substitute with appropriate alternative as covered by patient's insurance. Dx: E11.65   DULoxetine (CYMBALTA) 30 mg delayed release capsule   Yes No   Empagliflozin 25 MG TABS   No No   Sig: Take 1 tablet (25 mg total) by mouth daily   OneTouch Delica Lancets 23A MISC   No No   Sig: Check blood sugars once daily. Please substitute with appropriate alternative as covered by patient's insurance. Dx: E11.65   aspirin 81 mg chewable tablet   No No   Sig: Chew 1 tablet (81 mg total) daily Do not start before May 11, 2023.    dulaglutide (Trulicity) 2.74 IY/9.4RO injection   No No   Sig: Inject 0.5 mL (0.75 mg total) under the skin every 7 days   ergocalciferol (VITAMIN D2) 50,000 units   No No   Sig: Take 1 capsule (50,000 Units total) by mouth once a week   famotidine (PEPCID) 20 mg tablet   No No   Sig: Take 1 tablet (20 mg total) by mouth 2 (two) times a day as needed for indigestion or heartburn   fenofibrate 160 MG tablet   Yes No   Sig: Take 1 tablet by mouth in the morning   glucose blood test strip   No No   Sig: Use as instructed   losartan (COZAAR) 50 mg tablet   No No   Sig: Take 1 tablet (50 mg total) by mouth daily   meclizine (ANTIVERT) 12.5 MG tablet   No No   Sig: Take 1 tablet (12.5 mg total) by mouth every 8 (eight) hours   metFORMIN (GLUCOPHAGE) 1000 MG tablet   No No   Sig: Take 1 tablet (1,000 mg total) by mouth 2 (two) times a day with meals   metoprolol succinate (TOPROL-XL) 25 mg 24 hr tablet   No No   Sig: Take 1 tablet (25 mg total) by mouth daily at bedtime   rosuvastatin (CRESTOR) 20 MG tablet   No No   Sig: Take 1 tablet (20 mg total) by mouth daily   ticagrelor (Brilinta) 90 MG   No No   Sig: Take 1 tablet (90 mg total) by mouth every 12 (twelve) hours      Facility-Administered Medications: None       Past Medical History:   Diagnosis Date   • Coronary artery disease 2023   • Diabetes mellitus ([address])    • GERD (gastroesophageal reflux disease)    • Hypertension    • NSTEMI (non-ST elevated myocardial infarction) ([address]) 2023   • Vertigo        Past Surgical History:   Procedure Laterality Date   • CARDIAC CATHETERIZATION N/A 2023    Procedure: Cardiac catheterization;  Surgeon: Radu Kruse DO;  Location: [facility]; Service: Cardiology   • CARDIAC CATHETERIZATION N/A 2023    Procedure: Cardiac Coronary Angiogram;  Surgeon: Radu Kruse DO;  Location: [facility]; Service: Cardiology   • CARDIAC CATHETERIZATION N/A 2023    Procedure: Cardiac pci;  Surgeon: Radu Kruse DO;  Location: [facility]; Service: Cardiology   •  SECTION       and    • HYSTERECTOMY W/ SALPINGO-OOPHERECTOMY Bilateral     in [facility]       History reviewed. No pertinent family history. I have reviewed and agree with the history as documented.     E-Cigarette/Vaping   • E-Cigarette Use Never User      E-Cigarette/Vaping Substances     Social History     Tobacco Use   • Smoking status: Never   • Smokeless tobacco: Never   Vaping Use   • Vaping Use: Never used   Substance Use Topics   • Alcohol use: Never   • Drug use: Never       Review of Systems   Musculoskeletal: Negative for arthralgias and joint swelling. Skin: Positive for wound. Physical Exam  Physical Exam  Vitals reviewed. Constitutional:       Appearance: Normal appearance. She is normal weight. HENT:      Head: Normocephalic and atraumatic. Right Ear: External ear normal.      Left Ear: External ear normal.      Nose: Nose normal.   Eyes:      Conjunctiva/sclera: Conjunctivae normal.   Cardiovascular:      Rate and Rhythm: Normal rate. Pulmonary:      Effort: Pulmonary effort is normal.   Musculoskeletal:         General: Signs of injury present. Normal range of motion. Cervical back: Normal range of motion. Comments: Distal phalanx of right index, palmar surface   Skin:     General: Skin is warm and dry. Neurological:      Mental Status: She is alert.          Vital Signs  ED Triage Vitals [07/10/23 1248]   Temperature Pulse Respirations Blood Pressure SpO2   (!) 97.3 °F (36.3 °C) 72 20 105/70 99 %      Temp Source Heart Rate Source Patient Position - Orthostatic VS BP Location FiO2 (%)   Tympanic Monitor Sitting Left arm --      Pain Score       --           Vitals:    07/10/23 1248   BP: 105/70   Pulse: 72   Patient Position - Orthostatic VS: Sitting         Visual Acuity      ED Medications  Medications   tetanus-diphtheria-acellular pertussis (BOOSTRIX) IM injection 0.5 mL (0.5 mL Intramuscular Given 7/10/23 1258)   lidocaine (PF) (XYLOCAINE-MPF) 1 % injection 5 mL (5 mL Infiltration Given 7/10/23 1258)       Diagnostic Studies  Results Reviewed     None                 No orders to display              Procedures  Universal Protocol:  Patient identity confirmed: verbally with patient    Laceration repair    Date/Time: 7/10/2023 6:19 PM    Performed by: Karly Luis PA-C  Authorized by: Karly Luis PA-C  Laceration length: 2 cm  Anesthesia: local infiltration and digital block    Anesthesia:  Local Anesthetic: lidocaine 1% without epinephrine  Anesthetic total: 3 mL      Procedure Details:  Irrigation solution: tap water  Irrigation method: tap  Skin closure: Ethilon (5-0)  Number of sutures: 3  Technique: simple  Approximation difficulty: simple               ED Course                                             Medical Decision Making  Patient comes in with a laceration to right index finger. Laceration was closed to emergency room, patient was discharged back to work. Tetanus updated    Risk  Prescription drug management. Disposition  Final diagnoses:   Laceration of right index finger, foreign body presence unspecified, nail damage status unspecified, initial encounter     Time reflects when diagnosis was documented in both MDM as applicable and the Disposition within this note     Time User Action Codes Description Comment    7/10/2023  1:18 PM Katie Lerma Add [V15.598B] Laceration of right index finger, foreign body presence unspecified, nail damage status unspecified, initial encounter       ED Disposition     ED Disposition   Discharge    Condition   Stable    Date/Time   Mon Jul 10, 2023  1:18 PM    [address]low discharge to home/self care.                Follow-up Information     Follow up With Specialties Details Why Contact Info Additional Information    Robe Turk PA-C Physician Assistant, Family Medicine   [address] [facility]   [address]  [address] [id number]  [address] [place] [address] [address] [facility] Emergency Department Emergency Medicine  As needed, If symptoms worsen [address] [phone] [address] [facility] Emergency Department          Discharge Medication List as of 7/10/2023  1:24 PM      CONTINUE these medications which have NOT CHANGED    Details   Alcohol Swabs (Pharmacist Choice Alcohol) PADS Use in the morning, Starting Wed 3/22/2023, Normal      aspirin 81 mg chewable tablet Chew 1 tablet (81 mg total) daily Do not start before May 11, 2023., Starting Thu 5/11/2023, Until Wed 8/9/2023, Normal      Black Cohosh Root 450 MG CAPS Take 1 capsule (450 mg total) by mouth in the morning, Starting Tue 6/6/2023, Normal      Blood Glucose Monitoring Suppl (OneTouch Verio Reflect) w/Device KIT Check blood sugars once daily. Please substitute with appropriate alternative as covered by patient's insurance.  Dx: E11.65, Normal      dulaglutide (Trulicity) 2.30 PX/7.1NY injection Inject 0.5 mL (0.75 mg total) under the skin every 7 days, Starting Tue 6/6/2023, Normal      DULoxetine (CYMBALTA) 30 mg delayed release capsule Historical Med      Empagliflozin 25 MG TABS Take 1 tablet (25 mg total) by mouth daily, Starting Fri 6/9/2023, Until Wed 12/6/2023, Normal      ergocalciferol (VITAMIN D2) 50,000 units Take 1 capsule (50,000 Units total) by mouth once a week, Starting Wed 3/22/2023, Normal      famotidine (PEPCID) 20 mg tablet Take 1 tablet (20 mg total) by mouth 2 (two) times a day as needed for indigestion or heartburn, Starting Tue 6/6/2023, Normal      fenofibrate 160 MG tablet Take 1 tablet by mouth in the morning, Starting Thu 3/2/2023, Historical Med      glucose blood test strip Use as instructed, Normal      losartan (COZAAR) 50 mg tablet Take 1 tablet (50 mg total) by mouth daily, Starting Wed 5/10/2023, Until Tue 8/8/2023, Normal      meclizine (ANTIVERT) 12.5 MG tablet Take 1 tablet (12.5 mg total) by mouth every 8 (eight) hours, Starting Tue 6/6/2023, Normal      metFORMIN (GLUCOPHAGE) 1000 MG tablet Take 1 tablet (1,000 mg total) by mouth 2 (two) times a day with meals, Starting Thu 2/2/2023, Normal      metoprolol succinate (TOPROL-XL) 25 mg 24 hr tablet Take 1 tablet (25 mg total) by mouth daily at bedtime, Starting Wed 5/10/2023, Until Tue 8/8/2023, Normal      OneTouch Delica Lancets 11W MISC Check blood sugars once daily. Please substitute with appropriate alternative as covered by patient's insurance. Dx: E11.65, Normal      rosuvastatin (CRESTOR) 20 MG tablet Take 1 tablet (20 mg total) by mouth daily, Starting Fri 5/19/2023, Until Mon 5/13/2024, Normal      ticagrelor (Brilinta) 90 MG Take 1 tablet (90 mg total) by mouth every 12 (twelve) hours, Starting Fri 5/19/2023, Until Mon 5/13/2024, Normal             No discharge procedures on file.     PDMP Review     None          ED Provider  Electronically Signed by           Mine Beebe PA-C  07/10/23 6605

## 2023-07-10 NOTE — Clinical Note
Pauly rae Erika Benton was seen and treated in our emergency department on 7/10/2023. No restrictions            Diagnosis:     Jena  . She may return on this date: 07/11/2023         If you have any questions or concerns, please don't hesitate to call.       Jenae Crowder PA-C    ______________________________           _______________          _______________  Hospital Representative                              Date                                Time

## 2023-07-10 NOTE — Clinical Note
Ayaan rae Karla Juarez was seen and treated in our emergency department on 7/10/2023. No restrictions            Diagnosis:     Jena  . She may return on this date: 07/11/2023         If you have any questions or concerns, please don't hesitate to call.       Nichol Fink PA-C    ______________________________           _______________          _______________  Hospital Representative                              Date                                Time

## 2023-07-10 NOTE — Clinical Note
Nellie Fontana was seen and treated in our emergency department on 7/10/2023. Diagnosis:     Nereyda Odom  may return to work on return date. She may return on this date: 07/13/2023         If you have any questions or concerns, please don't hesitate to call.       Osiel Suggs MD    ______________________________           _______________          _______________  Hospital Representative                              Date                                Time

## 2023-07-10 NOTE — Clinical Note
Burton Carrel Jeannett January de Dakota Whiting was seen and treated in our emergency department on 7/10/2023. No restrictions            Diagnosis:     Jena  . She may return on this date: 07/11/2023         If you have any questions or concerns, please don't hesitate to call.       Sonam Brown PA-C    ______________________________           _______________          _______________  Hospital Representative                              Date                                Time

## 2023-07-11 ENCOUNTER — TELEPHONE (OUTPATIENT)
Dept: CARDIOLOGY CLINIC | Facility: CLINIC | Age: 48
End: 2023-07-11

## 2023-07-11 NOTE — TELEPHONE ENCOUNTER
Did return call to Divine Restrepo to see what her connection with patient is because not in as emergency contact but states she assists patient with speaking English and said if prefer to speak to patient will need Serbian interp.

## 2023-07-11 NOTE — TELEPHONE ENCOUNTER
Phone call from a friend Divine Lauro) who states that patient  Injured finger yesterday and had a lot of bleeding (pt is on [place]) and is stating the patient is concerned and showed her that she has bruising on legs/small spots largest size of a quarter. Is asking if needs to come to see Dr Aashish Mcintosh to evaluate. Please advise.

## 2023-07-12 DIAGNOSIS — T14.8XXA BRUISING: Primary | ICD-10-CM

## 2023-07-12 DIAGNOSIS — G62.9 NEUROPATHY: ICD-10-CM

## 2023-07-12 NOTE — TELEPHONE ENCOUNTER
Returned call to Jacquelin Hernandez and GARETH for patient to obtain ordered labs. Also reached out to patient with assistance of interp.with cryacom reached VM.

## 2023-07-13 NOTE — TELEPHONE ENCOUNTER
Used cryacom and reached out to Daughter # listed in chart which was listed as her spouse. Previous number in chart was not that of patient had interp verify patient number and also updated with correct number. Through interp  Daughter understands patient to get ordered labs by Dr Evelyn Zamarripa for bruising.

## 2023-07-15 ENCOUNTER — APPOINTMENT (OUTPATIENT)
Dept: LAB | Facility: HOSPITAL | Age: 48
End: 2023-07-15
Payer: COMMERCIAL

## 2023-07-15 DIAGNOSIS — E11.9 TYPE 2 DIABETES MELLITUS WITHOUT COMPLICATION, WITHOUT LONG-TERM CURRENT USE OF INSULIN (HCC): ICD-10-CM

## 2023-07-15 DIAGNOSIS — E78.1 HYPERTRIGLYCERIDEMIA: ICD-10-CM

## 2023-07-15 LAB
ALBUMIN SERPL BCP-MCNC: 4.6 G/DL (ref 3.5–5)
ALP SERPL-CCNC: 50 U/L (ref 34–104)
ALT SERPL W P-5'-P-CCNC: 15 U/L (ref 7–52)
ANION GAP SERPL CALCULATED.3IONS-SCNC: 11 MMOL/L
AST SERPL W P-5'-P-CCNC: 15 U/L (ref 13–39)
BASOPHILS # BLD AUTO: 0.04 THOUSANDS/ÂΜL (ref 0–0.1)
BASOPHILS NFR BLD AUTO: 1 % (ref 0–1)
BILIRUB SERPL-MCNC: 0.51 MG/DL (ref 0.2–1)
BUN SERPL-MCNC: 18 MG/DL (ref 5–25)
CALCIUM SERPL-MCNC: 9.3 MG/DL (ref 8.4–10.2)
CHLORIDE SERPL-SCNC: 106 MMOL/L (ref 96–108)
CHOLEST SERPL-MCNC: 115 MG/DL
CO2 SERPL-SCNC: 21 MMOL/L (ref 21–32)
CREAT SERPL-MCNC: 0.87 MG/DL (ref 0.6–1.3)
EOSINOPHIL # BLD AUTO: 0.27 THOUSAND/ÂΜL (ref 0–0.61)
EOSINOPHIL NFR BLD AUTO: 4 % (ref 0–6)
ERYTHROCYTE [DISTWIDTH] IN BLOOD BY AUTOMATED COUNT: 13.6 % (ref 11.6–15.1)
EST. AVERAGE GLUCOSE BLD GHB EST-MCNC: 177 MG/DL
GFR SERPL CREATININE-BSD FRML MDRD: 79 ML/MIN/1.73SQ M
GLUCOSE P FAST SERPL-MCNC: 155 MG/DL (ref 65–99)
HBA1C MFR BLD: 7.8 %
HCT VFR BLD AUTO: 39.9 % (ref 34.8–46.1)
HDLC SERPL-MCNC: 34 MG/DL
HGB BLD-MCNC: 12.5 G/DL (ref 11.5–15.4)
IMM GRANULOCYTES # BLD AUTO: 0.01 THOUSAND/UL (ref 0–0.2)
IMM GRANULOCYTES NFR BLD AUTO: 0 % (ref 0–2)
LDLC SERPL CALC-MCNC: 6 MG/DL (ref 0–100)
LYMPHOCYTES # BLD AUTO: 2.58 THOUSANDS/ÂΜL (ref 0.6–4.47)
LYMPHOCYTES NFR BLD AUTO: 38 % (ref 14–44)
MCH RBC QN AUTO: 27.8 PG (ref 26.8–34.3)
MCHC RBC AUTO-ENTMCNC: 31.3 G/DL (ref 31.4–37.4)
MCV RBC AUTO: 89 FL (ref 82–98)
MONOCYTES # BLD AUTO: 0.34 THOUSAND/ÂΜL (ref 0.17–1.22)
MONOCYTES NFR BLD AUTO: 5 % (ref 4–12)
NEUTROPHILS # BLD AUTO: 3.57 THOUSANDS/ÂΜL (ref 1.85–7.62)
NEUTS SEG NFR BLD AUTO: 52 % (ref 43–75)
NONHDLC SERPL-MCNC: 81 MG/DL
NRBC BLD AUTO-RTO: 0 /100 WBCS
PLATELET # BLD AUTO: 250 THOUSANDS/UL (ref 149–390)
PMV BLD AUTO: 11.4 FL (ref 8.9–12.7)
POTASSIUM SERPL-SCNC: 4 MMOL/L (ref 3.5–5.3)
PROT SERPL-MCNC: 7 G/DL (ref 6.4–8.4)
RBC # BLD AUTO: 4.49 MILLION/UL (ref 3.81–5.12)
SODIUM SERPL-SCNC: 138 MMOL/L (ref 135–147)
TRIGL SERPL-MCNC: 377 MG/DL
WBC # BLD AUTO: 6.81 THOUSAND/UL (ref 4.31–10.16)

## 2023-07-15 PROCEDURE — 80053 COMPREHEN METABOLIC PANEL: CPT

## 2023-07-15 PROCEDURE — 80061 LIPID PANEL: CPT

## 2023-07-15 PROCEDURE — 83036 HEMOGLOBIN GLYCOSYLATED A1C: CPT

## 2023-07-15 PROCEDURE — 36415 COLL VENOUS BLD VENIPUNCTURE: CPT

## 2023-07-15 PROCEDURE — 85025 COMPLETE CBC W/AUTO DIFF WBC: CPT

## 2023-07-19 ENCOUNTER — OFFICE VISIT (OUTPATIENT)
Dept: CARDIOLOGY CLINIC | Facility: CLINIC | Age: 48
End: 2023-07-19
Payer: COMMERCIAL

## 2023-07-19 VITALS
SYSTOLIC BLOOD PRESSURE: 95 MMHG | HEART RATE: 68 BPM | BODY MASS INDEX: 28.83 KG/M2 | WEIGHT: 195.2 LBS | DIASTOLIC BLOOD PRESSURE: 70 MMHG

## 2023-07-19 DIAGNOSIS — I25.10 CORONARY ARTERY DISEASE INVOLVING NATIVE CORONARY ARTERY OF NATIVE HEART WITHOUT ANGINA PECTORIS: Primary | ICD-10-CM

## 2023-07-19 DIAGNOSIS — I10 PRIMARY HYPERTENSION: ICD-10-CM

## 2023-07-19 DIAGNOSIS — Z95.5 HISTORY OF HEART ARTERY STENT: ICD-10-CM

## 2023-07-19 DIAGNOSIS — E78.1 HYPERTRIGLYCERIDEMIA: ICD-10-CM

## 2023-07-19 DIAGNOSIS — I25.2 HISTORY OF NON-ST ELEVATION MYOCARDIAL INFARCTION (NSTEMI): ICD-10-CM

## 2023-07-19 DIAGNOSIS — E11.00 TYPE 2 DIABETES MELLITUS WITH HYPEROSMOLARITY WITHOUT COMA, UNSPECIFIED WHETHER LONG TERM INSULIN USE (HCC): ICD-10-CM

## 2023-07-19 PROCEDURE — 99214 OFFICE O/P EST MOD 30 MIN: CPT | Performed by: INTERNAL MEDICINE

## 2023-07-19 RX ORDER — ICOSAPENT ETHYL 1000 MG/1
2 CAPSULE ORAL 2 TIMES DAILY
Qty: 120 CAPSULE | Refills: 11 | Status: SHIPPED | OUTPATIENT
Start: 2023-07-19

## 2023-07-19 NOTE — PROGRESS NOTES
[facility]  [address] [facility] [address] [place] [address]  Phone#  [phone]   Fax#  [phone]  *-*-*-*-*-*-*-*-*-*-*-*-*-*-*-*-*-*-*-*-*-*-*-*-*-*-*-*-*-*-*-*-*-*-*-*-*-*-*-*-*-*-*-*-*-*-*-*-*-*-*-*-*-*                                   Cardiology Follow Up      ENCOUNTER DATE: 23 2:40 PM  PATIENT NAME: Curlee Bloch   : 1975    MRN: [id number]  AGE:48 y.o. SEX: female  [facility] MD Josie     PRIMARY CARE PHYSICIAN: Marcos Rodriguez PA-C    ACTIVE DIAGNOSIS THIS VISIT  1. Coronary artery disease involving native coronary artery of native heart without angina pectoris        2. Hypertriglyceridemia  Icosapent Ethyl 1 g CAPS      3. History of non-ST elevation myocardial infarction (NSTEMI)        4. History of proximal LAD stent        5. Primary hypertension        6. Type 2 diabetes mellitus with hyperosmolarity without coma, unspecified whether long term insulin use ([address])          ACTIVE PROBLEM LIST  Patient Active Problem List   Diagnosis   • Hypertriglyceridemia   • Menopausal syndrome on hormone replacement therapy   • Type 2 diabetes mellitus (HCC)   • Gastroesophageal reflux disease without esophagitis   • Neuropathy   • Right foot pain   • Overweight (BMI 25.0-29. 9)   • Vertigo   • Neck pain   • Headache   • Primary hypertension   • Coronary artery disease involving native coronary artery of native heart without angina pectoris   • History of non-ST elevation myocardial infarction (NSTEMI)   • History of proximal LAD stent       CARDIOLOGY SPECIALTY COMMENTS  2023-5/10/2023 [facility] patient hospitalized with NSTEMI, peak troponin 213. Other diagnoses include primary hypertension, headache, type 2 diabetes mellitus, hypertriglyceridemia    2023 echocardiogram precardiac catheterization: LVEF 54% with grade 1 diastolic dysfunction.   GLS reduced at -12.7% anterior apical, anterior septal and apical, inferior apical, inferior lateral and apical hypokinesis. 5/9/2023 cardiac catheterization: Proximal LAD before the first diagonal and septal  95% treated with BRUNO 35 mm x 26 mm to 14 sandip with residual 0% lesion. Distal circumflex 80% lesion in a small territory. RCA dominant with proximal 35%. INTERVAL HISTORY:        Patient was very concerned about the bruising of her skin. She thought it was a circulation problem. Explained to her that it was secondary to the medication that she is taking which she needs. The medication and activates the platelets and the platelets in your blood is what prevents you from bruising. After 1 year since her stent is placed the aspirin will be stopped and the Brilinta will be reduced to 60 mg twice a day. At that point her breathing should get less. She needs to just put up with the bruising for now to prevent her from having a serious heart attack or even death. Reviewed with her the changes in her cholesterol. Her triglycerides are high but they are much improved from what they used to be. Recommended that seeing her PCP and having him adjust her blood sugar medications may improve her blood sugar control. This would result in her triglycerides being lower.   Also stressed to her the importance of diet and reducing the carbohydrates and sugars in her diet to obtain better blood sugar control    DISCUSSION/PLAN:          · Begin Vascepa 2 capsules twice a day  · Once patient starts Vascepa would decrease her rosuvastatin to 5 mg daily  · If patient gets good triglyceride control, will consider stopping fenofibrate  · Return in 3 months    Lab Studies:    Lab Results   Component Value Date    CHOLESTEROL 115 07/15/2023    CHOLESTEROL 268 (H) 05/08/2023    CHOLESTEROL 238 (H) 02/03/2023     Lab Results   Component Value Date    TRIG 377 (H) 07/15/2023    TRIG 1,091 (H) 05/08/2023    TRIG 373 (H) 02/03/2023     Lab Results   Component Value Date    HDL 34 (L) 07/15/2023    HDL 36 (L) 05/08/2023    HDL 40 (L) 02/03/2023     Lab Results   Component Value Date    LDLCALC 6 07/15/2023    [address]  05/08/2023      Comment:      Calculated LDL invalid, triglycerides >400 mg/dl    LDLCALC 123 02/03/2023       Lab Results   Component Value Date    LDLDIRECT 111 (H) 05/08/2023     Lab Results   Component Value Date    HGBA1C 7.8 (H) 07/15/2023      Lab Results   Component Value Date    EGFR 79 07/15/2023    EGFR 108 05/10/2023    EGFR 115 05/09/2023    SODIUM 138 07/15/2023    SODIUM 137 05/10/2023    SODIUM 138 05/09/2023    K 4.0 07/15/2023    K 3.7 05/10/2023    K 3.5 05/09/2023     07/15/2023     05/10/2023     05/09/2023    CO2 21 07/15/2023    CO2 21 05/10/2023    CO2 23 05/09/2023    BUN 18 07/15/2023    BUN 9 05/10/2023    BUN 9 05/09/2023    CREATININE 0.87 07/15/2023    CREATININE 0.61 05/10/2023    CREATININE 0.50 (L) 05/09/2023     Lab Results   Component Value Date    WBC 6.81 07/15/2023    WBC 5.34 05/10/2023    WBC 5.70 05/09/2023    HGB 12.5 07/15/2023    HGB 11.7 05/10/2023    HGB 11.2 (L) 05/09/2023    HCT 39.9 07/15/2023    HCT 35.8 05/10/2023    HCT 33.7 (L) 05/09/2023    MCV 89 07/15/2023    MCV 87 05/10/2023    MCV 88 05/09/2023    MCH 27.8 07/15/2023    MCH 28.4 05/10/2023    MCH 29.2 05/09/2023    MCHC 31.3 (L) 07/15/2023    MCHC 32.7 05/10/2023    MCHC 33.2 05/09/2023     07/15/2023     05/10/2023     05/09/2023      Lab Results   Component Value Date    CALCIUM 9.3 07/15/2023    CALCIUM 8.6 05/10/2023    CALCIUM 8.4 05/09/2023    AST 15 07/15/2023    AST 20 02/03/2023    ALT 15 07/15/2023    ALT 17 02/03/2023    ALKPHOS 50 07/15/2023    ALKPHOS 58 02/03/2023    MG 1.9 05/10/2023    MG 1.9 05/09/2023     No results found for this visit on 07/19/23.       Current Outpatient Medications:   •  Alcohol Swabs (Pharmacist Choice Alcohol) PADS, Use in the morning, Disp: 100 each, Rfl: 3  •  aspirin 81 mg chewable tablet, Chew 1 tablet (81 mg total) daily Do not start before May 11, 2023., Disp: 90 tablet, Rfl: 0  •  Black Cohosh Root 450 MG CAPS, Take 1 capsule (450 mg total) by mouth in the morning, Disp: 90 capsule, Rfl: 1  •  Blood Glucose Monitoring Suppl (OneTouch Verio Reflect) w/Device KIT, Check blood sugars once daily. Please substitute with appropriate alternative as covered by patient's insurance. Dx: E11.65, Disp: 1 kit, Rfl: 0  •  dulaglutide (Trulicity) 3.66 DF/8.4CK injection, Inject 0.5 mL (0.75 mg total) under the skin every 7 days, Disp: 4 mL, Rfl: 1  •  DULoxetine (CYMBALTA) 30 mg delayed release capsule, , Disp: , Rfl:   •  ergocalciferol (VITAMIN D2) 50,000 units, Take 1 capsule (50,000 Units total) by mouth once a week, Disp: 12 capsule, Rfl: 1  •  famotidine (PEPCID) 20 mg tablet, Take 1 tablet (20 mg total) by mouth 2 (two) times a day as needed for indigestion or heartburn, Disp: 60 tablet, Rfl: 5  •  fenofibrate 160 MG tablet, Take 1 tablet by mouth in the morning, Disp: , Rfl:   •  glucose blood test strip, Use as instructed, Disp: 100 strip, Rfl: 3  •  Icosapent Ethyl 1 g CAPS, Take 2 capsules (2 g total) by mouth 2 (two) times a day, Disp: 120 capsule, Rfl: 11  •  losartan (COZAAR) 50 mg tablet, Take 1 tablet (50 mg total) by mouth daily, Disp: 90 tablet, Rfl: 0  •  meclizine (ANTIVERT) 12.5 MG tablet, Take 1 tablet (12.5 mg total) by mouth every 8 (eight) hours, Disp: 30 tablet, Rfl: 1  •  metFORMIN (GLUCOPHAGE) 1000 MG tablet, Take 1 tablet (1,000 mg total) by mouth 2 (two) times a day with meals, Disp: 60 tablet, Rfl: 5  •  metoprolol succinate (TOPROL-XL) 25 mg 24 hr tablet, Take 1 tablet (25 mg total) by mouth daily at bedtime, Disp: 90 tablet, Rfl: 0  •  OneTouch Delica Lancets 17G MISC, Check blood sugars once daily. Please substitute with appropriate alternative as covered by patient's insurance.  Dx: E11.65, Disp: 100 each, Rfl: 3  •  rosuvastatin (CRESTOR) 20 MG tablet, Take 1 tablet (20 mg total) by mouth daily, Disp: 30 tablet, Rfl: 11  •  ticagrelor (Brilinta) 90 MG, Take 1 tablet (90 mg total) by mouth every 12 (twelve) hours, Disp: 60 tablet, Rfl: 11  •  Empagliflozin 25 MG TABS, Take 1 tablet (25 mg total) by mouth daily (Patient not taking: Reported on 7/19/2023), Disp: 30 tablet, Rfl: 5  No Known Allergies    Past Medical History:   Diagnosis Date   • Coronary artery disease 05/2023   • Diabetes mellitus (HCC)    • GERD (gastroesophageal reflux disease)    • Hypertension    • NSTEMI (non-ST elevated myocardial infarction) ([address] [facility]) 05/2023   • Vertigo      Social History     Socioeconomic History   • Marital status: /Civil Union     Spouse name: Not on file   • Number of children: Not on file   • Years of education: Not on file   • Highest education level: Not on file   Occupational History   • Not on file   Tobacco Use   • Smoking status: Never   • Smokeless tobacco: Never   Vaping Use   • Vaping Use: Never used   Substance and Sexual Activity   • Alcohol use: Never   • Drug use: Never   • Sexual activity: Not on file   Other Topics Concern   • Not on file   Social History Narrative   • Not on file     Social Determinants of Health     Financial Resource Strain: Not on file   Food Insecurity: Not on file   Transportation Needs: Not on file   Physical Activity: Not on file   Stress: Not on file   Social Connections: Not on file   Intimate Partner Violence: Not on file   Housing Stability: Not on file      History reviewed. No pertinent family history. Past Surgical History:   Procedure Laterality Date   • CARDIAC CATHETERIZATION N/A 5/9/2023    Procedure: Cardiac catheterization;  Surgeon: Sindy Barraza DO;  Location: [facility]; Service: Cardiology   • CARDIAC CATHETERIZATION N/A 5/9/2023    Procedure: Cardiac Coronary Angiogram;  Surgeon: Sindy Barraza DO;  Location: [facility];   Service: Cardiology   • CARDIAC CATHETERIZATION N/A 5/9/2023    Procedure: Cardiac pci;  Surgeon: Heidy Martinez DO;  Location: [facility]; Service: Cardiology   •  SECTION       and    • HYSTERECTOMY W/ SALPINGO-OOPHERECTOMY [facility]     [facility]       PREVIOUS WEIGHTS:   Wt Readings from Last 10 Encounters:   23 88.5 kg (195 lb 3.2 oz)   07/10/23 88.9 kg (196 lb)   23 93 kg (205 lb)   23 89.4 kg (197 lb 3.2 oz)   23 89.9 kg (198 lb 3.2 oz)   23 94.8 kg (209 lb)   23 90 kg (198 lb 6.4 oz)   23 91 kg (200 lb 9.6 oz)   23 89.2 kg (196 lb 9.6 oz)        Review of Systems:  Review of Systems   Respiratory: Negative for cough, choking, chest tightness, shortness of breath and wheezing. Cardiovascular: Negative for chest pain, palpitations and leg swelling. Musculoskeletal: Negative for gait problem. Skin: Negative for rash. Bruising   Neurological: Negative for dizziness, tremors, syncope, weakness, light-headedness, numbness and headaches. Psychiatric/Behavioral: Negative for agitation and behavioral problems. The patient is not hyperactive. Physical Exam:  BP 95/70 (BP Location: Left arm, Patient Position: Sitting, Cuff Size: Large)   Pulse 68   Wt 88.5 kg (195 lb 3.2 oz)   BMI 28.83 kg/m²     Physical Exam  Constitutional:       General: She is not in acute distress. Appearance: She is well-developed. HENT:      Head: Normocephalic and atraumatic. Neck:      Thyroid: No thyromegaly. Vascular: No carotid bruit or JVD. Trachea: No tracheal deviation. Cardiovascular:      Rate and Rhythm: Normal rate and regular rhythm. Pulses: Normal pulses. Heart sounds: Normal heart sounds. No murmur heard. No friction rub. No gallop. Pulmonary:      Effort: Pulmonary effort is normal. No respiratory distress. Breath sounds: Normal breath sounds. No wheezing, rhonchi or rales. Chest:      Chest wall: No tenderness.    Abdominal:      General: Bowel sounds are normal. There is no distension. Palpations: Abdomen is soft. Tenderness: There is no abdominal tenderness. Musculoskeletal:         General: Normal range of motion. Cervical back: Normal range of motion and neck supple. Right lower leg: No edema. Left lower leg: No edema. Skin:     General: Skin is warm and dry. Neurological:      General: No focal deficit present. Mental Status: She is alert and oriented to person, place, and time. Gait: Gait normal.   Psychiatric:         Mood and Affect: Mood normal.         Behavior: Behavior normal.         Thought Content: Thought content normal.         Judgment: Judgment normal.       ======================================================  Imaging:   I have personally reviewed pertinent reports. I spent 30 minutes on the patient's office visit. This time was spent on the day of the visit. I had direct contact with the patient in the office on the day of the visit. Greater than 50% of the total time was spent obtaining a history, examining patient, answering all patient questions, arranging and discussing plan of care with patient as well as directly providing instructions. Additional time then spent on orders and office chart. Portions of the record may have been created with voice recognition software. Occasional wrong word or "sound a like" substitutions may have occurred due to the inherent limitations of voice recognition software. Read the chart carefully and recognize, using context, where substitutions have occurred.     SIGNATURES:   Tosin Chawla MD

## 2023-07-24 DIAGNOSIS — E11.69 TYPE 2 DIABETES MELLITUS WITH OTHER SPECIFIED COMPLICATION, UNSPECIFIED WHETHER LONG TERM INSULIN USE (HCC): ICD-10-CM

## 2023-07-24 DIAGNOSIS — Z98.61 S/P PTCA (PERCUTANEOUS TRANSLUMINAL CORONARY ANGIOPLASTY): ICD-10-CM

## 2023-08-01 DIAGNOSIS — I21.4 NSTEMI (NON-ST ELEVATED MYOCARDIAL INFARCTION) (HCC): ICD-10-CM

## 2023-08-01 DIAGNOSIS — E11.9 TYPE 2 DIABETES MELLITUS WITHOUT COMPLICATION, WITHOUT LONG-TERM CURRENT USE OF INSULIN (HCC): ICD-10-CM

## 2023-08-01 RX ORDER — ASPIRIN 81 MG/1
81 TABLET, CHEWABLE ORAL DAILY
Qty: 90 TABLET | Refills: 2 | Status: SHIPPED | OUTPATIENT
Start: 2023-08-01 | End: 2023-10-30

## 2023-08-01 RX ORDER — LOSARTAN POTASSIUM 50 MG/1
50 TABLET ORAL DAILY
Qty: 90 TABLET | Refills: 2 | Status: SHIPPED | OUTPATIENT
Start: 2023-08-01 | End: 2023-10-30

## 2023-08-17 DIAGNOSIS — I21.4 NSTEMI (NON-ST ELEVATED MYOCARDIAL INFARCTION) (HCC): ICD-10-CM

## 2023-08-17 DIAGNOSIS — R42 VERTIGO: ICD-10-CM

## 2023-08-17 RX ORDER — METOPROLOL SUCCINATE 25 MG/1
25 TABLET, EXTENDED RELEASE ORAL
Qty: 90 TABLET | Refills: 1 | Status: SHIPPED | OUTPATIENT
Start: 2023-08-17 | End: 2024-02-13

## 2023-08-17 RX ORDER — MECLIZINE HCL 12.5 MG/1
12.5 TABLET ORAL EVERY 8 HOURS
Qty: 30 TABLET | Refills: 0 | Status: SHIPPED | OUTPATIENT
Start: 2023-08-17

## 2023-08-21 NOTE — PROGRESS NOTES
Patient is primarily Kazakh-speaking. Jearlean Holter at bedside to assist with translation as needed    Assessment/Plan:      Diagnoses and all orders for this visit:    Menopause  -     Ambulatory Referral to Obstetrics / Gynecology; Future  -     Estradiol-Progesterone 1-100 MG CAPS; Take 1 tablet by mouth daily        - reviewed options for symptom management including supplementations, SSRIs and HRT   -Reviewed with patient biggest concern is history of MI/CAD. With concern estrogen may increase risk for heart attack. Reviewed with patient multiple studies have shown decrease atherosclerotic disease in women on HRT. She is already diagnosed so studies have shown slight increase in possible heart attack. Patient verbalized understanding aware of risks desires to start HRT  -Rx Bijuva 1 tablet daily. #30 with 4 refills. Written information provided. Signs and symptoms to report reviewed  -Referral placed to Dr. Dave Khan for follow-up/management of HRT    RTO annual exam or sooner as needed  Subjective:     Patient ID: Marianne Tuttle is a 50 y.o. female. HPI  here to discuss menopausal symptoms. LMP prior to hysterectomy. Hysterectomy in  for heavy vaginal bleeding. symptoms include hot flashes, decreased libido, difficulty sleeping. Patient was on Vivelle dot doing well. Had NSTEMI 2023. Has been doing well. Was told to stop Vivelle-Dot after MI. Was then told to try phytostrogens is taking black cohosh without any improvement. Denies alleviating factors. Aggravating factors include being stressed, environment. Is interested in restarting HRT. Medical history significant for CAD, MI, DAX, HTN and vertigo. Patient is a non-smoker    Last pap smear prior to hysterectomy  Last mammogram 22 birads 1  CRC screening    Review of Systems   Constitutional: Negative for chills, fatigue and fever. Respiratory: Negative. Cardiovascular: Negative.     Genitourinary: Negative. Objective:  /70 (BP Location: Left arm)   Ht 5' 9" (1.753 m)   Wt 88.5 kg (195 lb)   BMI 28.80 kg/m²      Physical Exam  Vitals reviewed. Constitutional:       Appearance: Normal appearance. Cardiovascular:      Rate and Rhythm: Normal rate and regular rhythm. Heart sounds: Normal heart sounds. Pulmonary:      Effort: Pulmonary effort is normal.      Breath sounds: Normal breath sounds. Neurological:      Mental Status: She is alert and oriented to person, place, and time.    Psychiatric:         Mood and Affect: Mood normal.         Behavior: Behavior normal.

## 2023-08-22 ENCOUNTER — OFFICE VISIT (OUTPATIENT)
Dept: OBGYN CLINIC | Facility: MEDICAL CENTER | Age: 48
End: 2023-08-22
Payer: COMMERCIAL

## 2023-08-22 VITALS
HEIGHT: 69 IN | WEIGHT: 195 LBS | BODY MASS INDEX: 28.88 KG/M2 | DIASTOLIC BLOOD PRESSURE: 70 MMHG | SYSTOLIC BLOOD PRESSURE: 100 MMHG

## 2023-08-22 DIAGNOSIS — Z78.0 MENOPAUSE: Primary | ICD-10-CM

## 2023-08-22 PROCEDURE — 99203 OFFICE O/P NEW LOW 30 MIN: CPT | Performed by: NURSE PRACTITIONER

## 2023-09-06 ENCOUNTER — OFFICE VISIT (OUTPATIENT)
Dept: FAMILY MEDICINE CLINIC | Facility: CLINIC | Age: 48
End: 2023-09-06
Payer: COMMERCIAL

## 2023-09-06 VITALS
WEIGHT: 198.4 LBS | HEART RATE: 72 BPM | TEMPERATURE: 98 F | OXYGEN SATURATION: 100 % | BODY MASS INDEX: 29.38 KG/M2 | DIASTOLIC BLOOD PRESSURE: 64 MMHG | HEIGHT: 69 IN | SYSTOLIC BLOOD PRESSURE: 105 MMHG | RESPIRATION RATE: 17 BRPM

## 2023-09-06 DIAGNOSIS — Z12.31 SCREENING MAMMOGRAM FOR BREAST CANCER: ICD-10-CM

## 2023-09-06 DIAGNOSIS — I10 PRIMARY HYPERTENSION: ICD-10-CM

## 2023-09-06 DIAGNOSIS — M25.562 ACUTE PAIN OF LEFT KNEE: ICD-10-CM

## 2023-09-06 DIAGNOSIS — E11.9 TYPE 2 DIABETES MELLITUS WITHOUT COMPLICATION, UNSPECIFIED WHETHER LONG TERM INSULIN USE (HCC): Primary | ICD-10-CM

## 2023-09-06 DIAGNOSIS — E66.3 OVERWEIGHT (BMI 25.0-29.9): ICD-10-CM

## 2023-09-06 DIAGNOSIS — I25.10 CORONARY ARTERY DISEASE INVOLVING NATIVE CORONARY ARTERY OF NATIVE HEART WITHOUT ANGINA PECTORIS: ICD-10-CM

## 2023-09-06 DIAGNOSIS — N95.1 MENOPAUSAL SYNDROME ON HORMONE REPLACEMENT THERAPY: ICD-10-CM

## 2023-09-06 DIAGNOSIS — Z79.890 MENOPAUSAL SYNDROME ON HORMONE REPLACEMENT THERAPY: ICD-10-CM

## 2023-09-06 PROCEDURE — 99214 OFFICE O/P EST MOD 30 MIN: CPT | Performed by: PHYSICIAN ASSISTANT

## 2023-09-06 RX ORDER — DULAGLUTIDE 1.5 MG/.5ML
1.5 INJECTION, SOLUTION SUBCUTANEOUS
Qty: 6 ML | Refills: 1 | Status: SHIPPED | OUTPATIENT
Start: 2023-09-06 | End: 2024-03-04

## 2023-09-06 NOTE — ASSESSMENT & PLAN NOTE
Improving but still not at goal. 170 this morning. 140 postprandial. Previously given samples for Jardiance and dose was increased to 25mg. Patient did not know the names of medications she was taking and was discontinued in error. Continue same max dose Jardiance 25mg. Will increase Trulicity to 2.1BF once weekly due to still with elevated fasting sugars in the morning. Continue max dose metformin BID. Follow-up in 3 months for repeat A1c.    Lab Results   Component Value Date    HGBA1C 7.8 (H) 07/15/2023

## 2023-09-06 NOTE — ASSESSMENT & PLAN NOTE
Significant hot flashes impairing sleep. Previously on estrogen patches, discontinued due to NSTEMI and change in brand that was not as effective. Failed black cohosh, started on HRT per GYN last week, needs prior auth, continue Cymbalta 30mg.

## 2023-09-06 NOTE — PROGRESS NOTES
Name: Doc Ann      : 1975      MRN: [id number]  Encounter Provider: Claudette Gibbons, PA-C  Encounter Date: 2023   Encounter department: [address] [place] [address]     1. Type 2 diabetes mellitus without complication, unspecified whether long term insulin use (HCC)  Assessment & Plan:  Improving but still not at goal. 170 this morning. 140 postprandial. Previously given samples for Jardiance and dose was increased to 25mg. Patient did not know the names of medications she was taking and was discontinued in error. Continue same max dose Jardiance 25mg. Will increase Trulicity to 5.3MH once weekly due to still with elevated fasting sugars in the morning. Continue max dose metformin BID. Follow-up in 3 months for repeat A1c. Lab Results   Component Value Date    HGBA1C 7.8 (H) 07/15/2023       Orders:  -     dulaglutide (Trulicity) 1.5 VG/3.5FN injection; Inject 0.5 mL (1.5 mg total) under the skin every 7 days    2. Primary hypertension  Assessment & Plan:  Well-controlled on losartan 50mg and metoprolol 25mg. Continue same. 3. Overweight (BMI 25.0-29. 9)  Assessment & Plan:  BMI Counseling: Body mass index is 29.3 kg/m². The BMI is above normal. Nutrition recommendations include reducing portion sizes, decreasing overall calorie intake, 3-5 servings of fruits/vegetables daily and reducing fast food intake. Exercise recommendations include exercising 3-5 times per week and strength training exercises. 4. Coronary artery disease involving native coronary artery of native heart without angina pectoris  Assessment & Plan:  Hx of stent, continue DAPT with Brilinta and aspirin. Follow-up cardiology as scheduled this month. Was started on vascepa, continue same dose rosuvastatin 20mg, considering decrease dose. With very low LDL on high potency statin but high trigs.      Lab Results   Component Value Date    CHOLESTEROL 115 07/15/2023 CHOLESTEROL 268 (H) 05/08/2023    CHOLESTEROL 238 (H) 02/03/2023     Lab Results   Component Value Date    HDL 34 (L) 07/15/2023    HDL 36 (L) 05/08/2023    HDL 40 (L) 02/03/2023     Lab Results   Component Value Date    TRIG 377 (H) 07/15/2023    TRIG 1,091 (H) 05/08/2023    TRIG 373 (H) 02/03/2023     Lab Results   Component Value Date    [address] 07/15/2023    [address] 198 02/03/2023     Lab Results   Component Value Date    LDLCALC 6 07/15/2023           5. Acute pain of left knee  Comments:  x1 week, recommend quad strengthening exercises, start tylenol as needed, avoid NSAIDs due to taking DAPT and GERD, follow-up if no improvement    6. Menopausal syndrome on hormone replacement therapy  Assessment & Plan:  Significant hot flashes impairing sleep. Previously on estrogen patches, discontinued due to NSTEMI and change in brand that was not as effective. Failed black cohosh, started on HRT per GYN last week, needs prior auth, continue Cymbalta 30mg. 7. Screening mammogram for breast cancer  -     Mammo screening bilateral w 3d & cad; Future; Expected date: 09/06/2023           Jamey Benavides is a 52 y.o. female with a h/o diabetes, HLD, HTN, menopause, hx NSTEMI s/p stent for CAD. She continues to have intolerable hot flashes mainly at night and during the day as well. Random bruising in lower legs that she is concerned about taking DAPT. Already saw cardiology regarding this who provided reassurance. She saw GYN last week who started her on pills for HRT but was not given in pharmacy due to needing more information for insurance. She has cardiology appointment later this month. She checks her sugars frequently. Recently ranging 170s in the morning. Will drop to 110s before bed. No hypoglycemic episodes. For the past week, she has had L knee pain which hurts more to go up stairs. Using lidocaine cream with some minimal improvement. No inciting injury or event. No smoking or alcohol use.     History was conducted in Lithuanian without the use of . Review of Systems   Constitutional: Negative for fever and unexpected weight change. Respiratory: Negative for shortness of breath. Cardiovascular: Negative for chest pain. Musculoskeletal: Positive for arthralgias. Negative for back pain, gait problem, joint swelling and myalgias. Current Outpatient Medications on File Prior to Visit   Medication Sig   • Alcohol Swabs (Pharmacist Choice Alcohol) PADS Use in the morning   • aspirin 81 mg chewable tablet Chew 1 tablet (81 mg total) daily   • Blood Glucose Monitoring Suppl (OneTouch Verio Reflect) w/Device KIT Check blood sugars once daily. Please substitute with appropriate alternative as covered by patient's insurance. Dx: E11.65   • DULoxetine (CYMBALTA) 30 mg delayed release capsule    • Empagliflozin 25 MG TABS Take 25 mg by mouth every morning   • ergocalciferol (VITAMIN D2) 50,000 units Take 1 capsule (50,000 Units total) by mouth once a week   • famotidine (PEPCID) 20 mg tablet Take 1 tablet (20 mg total) by mouth 2 (two) times a day as needed for indigestion or heartburn   • glucose blood test strip Use as instructed   • losartan (COZAAR) 50 mg tablet Take 1 tablet (50 mg total) by mouth daily   • meclizine (ANTIVERT) 12.5 MG tablet TAKE ONE TABLET BY MOUTH EVERY 8 HOURS   • metFORMIN (GLUCOPHAGE) 1000 MG tablet Take 1 tablet (1,000 mg total) by mouth 2 (two) times a day with meals   • metoprolol succinate (TOPROL-XL) 25 mg 24 hr tablet Take 1 tablet (25 mg total) by mouth daily at bedtime   • OneTouch Delica Lancets 13U MISC Check blood sugars once daily. Please substitute with appropriate alternative as covered by patient's insurance.  Dx: E11.65   • rosuvastatin (CRESTOR) 20 MG tablet Take 1 tablet (20 mg total) by mouth daily   • ticagrelor (Brilinta) 90 MG Take 1 tablet (90 mg total) by mouth every 12 (twelve) hours   • [DISCONTINUED] Black Cohosh Root 450 MG CAPS Take 1 capsule (450 mg total) by mouth in the morning   • [DISCONTINUED] dulaglutide (Trulicity) 4.39 EY/8.0TC injection Inject 0.5 mL (0.75 mg total) under the skin every 7 days   • Estradiol-Progesterone 1-100 MG CAPS Take 1 tablet by mouth daily (Patient not taking: Reported on 9/6/2023)       Objective     /64 (BP Location: Left arm, Patient Position: Sitting, Cuff Size: Standard)   Pulse 72   Temp 98 °F (36.7 °C) (Tympanic)   Resp 17   Ht 5' 9" (1.753 m)   Wt 90 kg (198 lb 6.4 oz)   SpO2 100%   BMI 29.30 kg/m²     Physical Exam  Vitals and nursing note reviewed. Constitutional:       Appearance: Normal appearance. HENT:      Head: Normocephalic and atraumatic. Cardiovascular:      Rate and Rhythm: Normal rate and regular rhythm. Pulses: Normal pulses. Heart sounds: Normal heart sounds. Pulmonary:      Effort: Pulmonary effort is normal.      Breath sounds: Normal breath sounds. Musculoskeletal:      Right knee: No bony tenderness. Normal range of motion. No tenderness. Instability Tests: Anterior drawer test negative. Posterior drawer test negative. Anterior Lachman test negative. Medial Jose R test negative and lateral Jose R test negative. Left knee: No bony tenderness. Normal range of motion. No tenderness. Instability Tests: Anterior drawer test negative. Posterior drawer test negative. Anterior Lachman test negative. Medial Jose R test negative and lateral Jose R test negative. Legs:    Neurological:      Mental Status: She is alert and oriented to person, place, and time. Mental status is at baseline.        Neal Spivey PA-C

## 2023-09-06 NOTE — ASSESSMENT & PLAN NOTE
BMI Counseling: Body mass index is 29.3 kg/m². The BMI is above normal. Nutrition recommendations include reducing portion sizes, decreasing overall calorie intake, 3-5 servings of fruits/vegetables daily and reducing fast food intake. Exercise recommendations include exercising 3-5 times per week and strength training exercises.

## 2023-09-06 NOTE — ASSESSMENT & PLAN NOTE
Hx of stent, continue DAPT with Brilinta and aspirin. Follow-up cardiology as scheduled this month. Was started on vascepa, continue same dose rosuvastatin 20mg, considering decrease dose. With very low LDL on high potency statin but high trigs.      Lab Results   Component Value Date    CHOLESTEROL 115 07/15/2023    CHOLESTEROL 268 (H) 05/08/2023    CHOLESTEROL 238 (H) 02/03/2023     Lab Results   Component Value Date    HDL 34 (L) 07/15/2023    HDL 36 (L) 05/08/2023    HDL 40 (L) 02/03/2023     Lab Results   Component Value Date    TRIG 377 (H) 07/15/2023    TRIG 1,091 (H) 05/08/2023    TRIG 373 (H) 02/03/2023     Lab Results   Component Value Date    [address] 81 07/15/2023    [address] 198 02/03/2023     Lab Results   Component Value Date    LDLCALC 6 07/15/2023

## 2023-09-07 ENCOUNTER — TELEPHONE (OUTPATIENT)
Dept: OBGYN CLINIC | Facility: MEDICAL CENTER | Age: 48
End: 2023-09-07

## 2023-09-07 NOTE — TELEPHONE ENCOUNTER
----- Message from [address] sent at 9/7/2023  8:54 AM EDT -----  Regarding: FW: HRT not covered  Can we work on PA for the medication please. Thank you   ----- Message -----  From: Nicolas Shelton PA-C  Sent: 9/6/2023   5:20 PM EDT  To: DOROTEO Smart  Subject: HRT not covered                                  Samy Lund,    I saw Marianne Tuttle ([birth year]) for follow-up today and she recently saw you last week for menopausal symptoms for which you started her on Bijuva 1 tablet daily. She was previously on estrogen patches. I wanted to let you know that the Gaviota [place] was not covered by insurance and it seems like it requires prior auth, she has significant hot flashes and tried contacting you office with no response. She would like alternative or for prior auth done as needed to be resolved as soon as possible. I appreciate your consult in advance.      Nicolas Shelton PA-C

## 2023-09-11 NOTE — TELEPHONE ENCOUNTER
Caity Peters was denied by insurance. Please see reason:  "Denied completely because:  we were not able to establish medical necessity based on the information submitted by your doctor. The request does not meet our rules for estrogens. To meet our rules, you must try two of the following preferred drugs on the [facility] Preferred Drug List (PDL).   • Angeliq Tablet, Climara Pro Patch, Combipatch, Delestrogen Vial, Depo-Estradiol Vial, Elestrin Gel, Estradiol Patch (Once-Weekly), Estradiol Patch (Twice-Weekly), Estradiol Tablet, Estradiol Vaginal Cream, Estradiol Vaginal Tablet, Estradiol Valerate Vial, Estring Vaginal Ring, Femring Vaginal Ring, Fyavolv Tablet, Jinteli Tablet, Norethindrone-Ethinyl Estradiol Tablet (generic Femhrt Tablet), Premarin Tablet, Premarin Vaginal Cream, Premphase Tablet, Prempro Tablet, Vagifem Vaginal Tablet, Yuvafem Vaginal Insert"

## 2023-09-12 NOTE — TELEPHONE ENCOUNTER
Called patient but unable to leave message due to vm not being set up. Provider recommends patient waits for her appointment with specialist given recent heart attack. We can provide a list of supplements she can try to help with the hot flashes in the meantime.

## 2023-09-14 ENCOUNTER — TELEPHONE (OUTPATIENT)
Dept: OBGYN CLINIC | Facility: MEDICAL CENTER | Age: 48
End: 2023-09-14

## 2023-09-14 DIAGNOSIS — E78.1 HYPERTRIGLYCERIDEMIA: ICD-10-CM

## 2023-09-14 DIAGNOSIS — N95.9 MENOPAUSAL AND POSTMENOPAUSAL DISORDER: Primary | ICD-10-CM

## 2023-09-14 DIAGNOSIS — E11.00 TYPE 2 DIABETES MELLITUS WITH HYPEROSMOLARITY WITHOUT COMA, UNSPECIFIED WHETHER LONG TERM INSULIN USE (HCC): Primary | ICD-10-CM

## 2023-09-14 RX ORDER — ROSUVASTATIN CALCIUM 20 MG/1
20 TABLET, COATED ORAL DAILY
Qty: 90 TABLET | Refills: 3 | Status: SHIPPED | OUTPATIENT
Start: 2023-09-14 | End: 2024-09-08

## 2023-09-14 RX ORDER — NORETHINDRONE ACETATE AND ETHINYL ESTRADIOL .5; 2.5 MG/1; UG/1
1 TABLET ORAL DAILY
Qty: 30 TABLET | Refills: 4 | Status: SHIPPED | OUTPATIENT
Start: 2023-09-14

## 2023-09-14 NOTE — TELEPHONE ENCOUNTER
Pt called office the medication the Ashely prescribe her wasn't approved by her insurance, she wants to know if there is something else she can take or Ashely recommends.   Please advise

## 2023-09-22 ENCOUNTER — OFFICE VISIT (OUTPATIENT)
Dept: CARDIOLOGY CLINIC | Facility: CLINIC | Age: 48
End: 2023-09-22
Payer: COMMERCIAL

## 2023-09-22 VITALS
DIASTOLIC BLOOD PRESSURE: 68 MMHG | WEIGHT: 190.2 LBS | SYSTOLIC BLOOD PRESSURE: 114 MMHG | HEART RATE: 80 BPM | BODY MASS INDEX: 28.09 KG/M2

## 2023-09-22 DIAGNOSIS — I25.10 CORONARY ARTERY DISEASE INVOLVING NATIVE CORONARY ARTERY OF NATIVE HEART WITHOUT ANGINA PECTORIS: Primary | ICD-10-CM

## 2023-09-22 DIAGNOSIS — E11.9 TYPE 2 DIABETES MELLITUS WITHOUT COMPLICATION, WITHOUT LONG-TERM CURRENT USE OF INSULIN (HCC): ICD-10-CM

## 2023-09-22 DIAGNOSIS — E11.00 TYPE 2 DIABETES MELLITUS WITH HYPEROSMOLARITY WITHOUT COMA, UNSPECIFIED WHETHER LONG TERM INSULIN USE (HCC): ICD-10-CM

## 2023-09-22 DIAGNOSIS — E78.1 HYPERTRIGLYCERIDEMIA: ICD-10-CM

## 2023-09-22 DIAGNOSIS — I21.4 NSTEMI (NON-ST ELEVATED MYOCARDIAL INFARCTION) (HCC): ICD-10-CM

## 2023-09-22 DIAGNOSIS — I25.2 HISTORY OF NON-ST ELEVATION MYOCARDIAL INFARCTION (NSTEMI): ICD-10-CM

## 2023-09-22 DIAGNOSIS — Z98.61 S/P PTCA (PERCUTANEOUS TRANSLUMINAL CORONARY ANGIOPLASTY): ICD-10-CM

## 2023-09-22 DIAGNOSIS — I10 PRIMARY HYPERTENSION: ICD-10-CM

## 2023-09-22 DIAGNOSIS — Z95.5 HISTORY OF HEART ARTERY STENT: ICD-10-CM

## 2023-09-22 PROCEDURE — 99214 OFFICE O/P EST MOD 30 MIN: CPT | Performed by: INTERNAL MEDICINE

## 2023-09-22 RX ORDER — ASPIRIN 81 MG/1
81 TABLET, CHEWABLE ORAL DAILY
Qty: 90 TABLET | Refills: 3 | Status: SHIPPED | OUTPATIENT
Start: 2023-09-22 | End: 2023-12-21

## 2023-09-22 NOTE — PROGRESS NOTES
[facility]  [address] [facility] [address] [facility] [address]  Phone#  [phone]   Fax#  [phone]  *-*-*-*-*-*-*-*-*-*-*-*-*-*-*-*-*-*-*-*-*-*-*-*-*-*-*-*-*-*-*-*-*-*-*-*-*-*-*-*-*-*-*-*-*-*-*-*-*-*-*-*-*-*                                   Cardiology Follow Up      ENCOUNTER DATE: 23 12:39 PM  PATIENT NAME: Ender Neely   : 1975    MRN: [id number]  AGE:48 y.o. SEX: female  [address], [place]     PRIMARY CARE PHYSICIAN: Lucho Smith PA-C    ACTIVE DIAGNOSIS THIS VISIT  1. Coronary artery disease involving native coronary artery of native heart without angina pectoris        2. Hypertriglyceridemia        3. Primary hypertension        4. History of non-ST elevation myocardial infarction (NSTEMI)        5. History of proximal LAD stent        6. Type 2 diabetes mellitus with hyperosmolarity without coma, unspecified whether long term insulin use ([address])        7. S/P PTCA (percutaneous transluminal coronary angioplasty)  ticagrelor (Brilinta) 90 MG      8. Type 2 diabetes mellitus without complication, without long-term current use of insulin ([address])        9. NSTEMI (non-ST elevated myocardial infarction) ([address])  aspirin 81 mg chewable tablet        ACTIVE PROBLEM LIST  Patient Active Problem List   Diagnosis   • Hypertriglyceridemia   • Menopausal syndrome on hormone replacement therapy   • Type 2 diabetes mellitus (HCC)   • Gastroesophageal reflux disease without esophagitis   • Neuropathy   • Right foot pain   • Overweight (BMI 25.0-29. 9)   • Vertigo   • Neck pain   • Headache   • Primary hypertension   • Coronary artery disease involving native coronary artery of native heart without angina pectoris   • History of non-ST elevation myocardial infarction (NSTEMI)   • History of proximal LAD stent       CARDIOLOGY SPECIALTY COMMENTS  2023-5/10/2023 [facility] patient hospitalized with NSTEMI, peak troponin 213.   Other diagnoses include primary hypertension, headache, type 2 diabetes mellitus, hypertriglyceridemia    5/9/2023 echocardiogram precardiac catheterization: LVEF 54% with grade 1 diastolic dysfunction. GLS reduced at -12.7% anterior apical, anterior septal and apical, inferior apical, inferior lateral and apical hypokinesis. 5/9/2023 cardiac catheterization: Proximal LAD before the first diagonal and septal  95% treated with BRUNO 35 mm x 26 mm to 14 sandip with residual 0% lesion. Distal circumflex 80% lesion in a small territory. RCA dominant with proximal 35%. INTERVAL HISTORY:        Patient with prior PCI in May 2023 with stent of the proximal LAD. She states that she is taking her Brilinta and aspirin. She denies cardiac symptoms. Patient denies chest discomfort or shortness of breath. Patient has no palpitations. Patient denies symptoms of dizziness, lightheadedness or near-syncope/syncope. Patient denies leg edema. Patient denies symptoms of orthopnea or paroxysmal nocturnal dyspnea. She asked for refills for her Brilinta. However, I had just filled it in July with a month at a time and 11 refills. I told her to take the bottle into her pharmacy and ask for a refill. However she states that she threw the bilateral out. So not to have confusion, I sent new refills for Brilinta and aspirin to her pharmacy I told her, she needs to keep the bottle when it is empty and take it into the pharmacy and ask for a refill      DISCUSSION/PLAN:          Return in 3 months.     Lab Studies:    Lab Results   Component Value Date    CHOLESTEROL 115 07/15/2023    CHOLESTEROL 268 (H) 05/08/2023    CHOLESTEROL 238 (H) 02/03/2023     Lab Results   Component Value Date    TRIG 377 (H) 07/15/2023    TRIG 1,091 (H) 05/08/2023    TRIG 373 (H) 02/03/2023     Lab Results   Component Value Date    HDL 34 (L) 07/15/2023    HDL 36 (L) 05/08/2023    HDL 40 (L) 02/03/2023     Lab Results   Component Value Date    LDLCALC 6 07/15/2023 [facility]  05/08/2023      Comment:      Calculated LDL invalid, triglycerides >400 mg/dl    LDLCALC 123 02/03/2023       Lab Results   Component Value Date    LDLDIRECT 111 (H) 05/08/2023       Lab Results   Component Value Date    HGBA1C 7.8 (H) 07/15/2023      Lab Results   Component Value Date    EGFR 79 07/15/2023    EGFR 108 05/10/2023    EGFR 115 05/09/2023    SODIUM 138 07/15/2023    SODIUM 137 05/10/2023    SODIUM 138 05/09/2023    K 4.0 07/15/2023    K 3.7 05/10/2023    K 3.5 05/09/2023     07/15/2023     05/10/2023     05/09/2023    CO2 21 07/15/2023    CO2 21 05/10/2023    CO2 23 05/09/2023    BUN 18 07/15/2023    BUN 9 05/10/2023    BUN 9 05/09/2023    CREATININE 0.87 07/15/2023    CREATININE 0.61 05/10/2023    CREATININE 0.50 (L) 05/09/2023     Lab Results   Component Value Date    WBC 6.81 07/15/2023    WBC 5.34 05/10/2023    WBC 5.70 05/09/2023    HGB 12.5 07/15/2023    HGB 11.7 05/10/2023    HGB 11.2 (L) 05/09/2023    HCT 39.9 07/15/2023    HCT 35.8 05/10/2023    HCT 33.7 (L) 05/09/2023    MCV 89 07/15/2023    MCV 87 05/10/2023    MCV 88 05/09/2023    MCH 27.8 07/15/2023    MCH 28.4 05/10/2023    MCH 29.2 05/09/2023    MCHC 31.3 (L) 07/15/2023    MCHC 32.7 05/10/2023    MCHC 33.2 05/09/2023     07/15/2023     05/10/2023     05/09/2023      Lab Results   Component Value Date    CALCIUM 9.3 07/15/2023    CALCIUM 8.6 05/10/2023    CALCIUM 8.4 05/09/2023    AST 15 07/15/2023    AST 20 02/03/2023    ALT 15 07/15/2023    ALT 17 02/03/2023    ALKPHOS 50 07/15/2023    ALKPHOS 58 02/03/2023    MG 1.9 05/10/2023    MG 1.9 05/09/2023     No results found for this visit on 09/22/23.       Current Outpatient Medications:   •  Alcohol Swabs (Pharmacist Choice Alcohol) PADS, Use in the morning, Disp: 100 each, Rfl: 3  •  aspirin 81 mg chewable tablet, Chew 1 tablet (81 mg total) daily, Disp: 90 tablet, Rfl: 3  •  Blood Glucose Monitoring Suppl (OneTouch Verio Reflect) w/Device KIT, Check blood sugars once daily. Please substitute with appropriate alternative as covered by patient's insurance. Dx: E11.65, Disp: 1 kit, Rfl: 0  •  dulaglutide (Trulicity) 1.5 NS/8.0ER injection, Inject 0.5 mL (1.5 mg total) under the skin every 7 days, Disp: 6 mL, Rfl: 1  •  DULoxetine (CYMBALTA) 30 mg delayed release capsule, , Disp: , Rfl:   •  Empagliflozin 25 MG TABS, Take 1 tablet (25 mg total) by mouth every morning, Disp: 90 tablet, Rfl: 1  •  ergocalciferol (VITAMIN D2) 50,000 units, Take 1 capsule (50,000 Units total) by mouth once a week, Disp: 12 capsule, Rfl: 1  •  famotidine (PEPCID) 20 mg tablet, Take 1 tablet (20 mg total) by mouth 2 (two) times a day as needed for indigestion or heartburn, Disp: 60 tablet, Rfl: 5  •  glucose blood test strip, Use as instructed, Disp: 100 strip, Rfl: 3  •  losartan (COZAAR) 50 mg tablet, Take 1 tablet (50 mg total) by mouth daily, Disp: 90 tablet, Rfl: 2  •  meclizine (ANTIVERT) 12.5 MG tablet, TAKE ONE TABLET BY MOUTH EVERY 8 HOURS, Disp: 30 tablet, Rfl: 0  •  metFORMIN (GLUCOPHAGE) 1000 MG tablet, Take 1 tablet (1,000 mg total) by mouth 2 (two) times a day with meals, Disp: 60 tablet, Rfl: 5  •  metoprolol succinate (TOPROL-XL) 25 mg 24 hr tablet, Take 1 tablet (25 mg total) by mouth daily at bedtime, Disp: 90 tablet, Rfl: 1  •  norethindrone-ethinyl estradiol (Femhrt) 0.5-2.5 MG-MCG per tablet, Take 1 tablet by mouth daily, Disp: 30 tablet, Rfl: 4  •  OneTouch Delica Lancets 42M MISC, Check blood sugars once daily. Please substitute with appropriate alternative as covered by patient's insurance.  Dx: E11.65, Disp: 100 each, Rfl: 3  •  rosuvastatin (CRESTOR) 20 MG tablet, Take 1 tablet (20 mg total) by mouth daily, Disp: 90 tablet, Rfl: 3  •  ticagrelor (Brilinta) 90 MG, Take 1 tablet (90 mg total) by mouth every 12 (twelve) hours, Disp: 60 tablet, Rfl: 11  No Known Allergies    Past Medical History:   Diagnosis Date   • Coronary artery disease 05/2023   • Diabetes mellitus (HCC)    • GERD (gastroesophageal reflux disease)    • Hypertension    • NSTEMI (non-ST elevated myocardial infarction) ([address]) 2023   • Vertigo      Social History     Socioeconomic History   • Marital status: /Civil Union     Spouse name: Not on file   • Number of children: Not on file   • Years of education: Not on file   • Highest education level: Not on file   Occupational History   • Not on file   Tobacco Use   • Smoking status: Never   • Smokeless tobacco: Never   Vaping Use   • Vaping Use: Never used   Substance and Sexual Activity   • Alcohol use: Never   • Drug use: Never   • Sexual activity: Not Currently     Partners: Male     Birth control/protection: Female Sterilization   Other Topics Concern   • Not on file   Social History Narrative   • Not on file     Social Determinants of Health     Financial Resource Strain: Not on file   Food Insecurity: Not on file   Transportation Needs: Not on file   Physical Activity: Not on file   Stress: Not on file   Social Connections: Not on file   Intimate Partner Violence: Not on file   Housing Stability: Not on file      History reviewed. No pertinent family history. Past Surgical History:   Procedure Laterality Date   • CARDIAC CATHETERIZATION N/A 2023    Procedure: Cardiac catheterization;  Surgeon: Courtney Lee DO;  Location: [facility]; Service: Cardiology   • CARDIAC CATHETERIZATION N/A 2023    Procedure: Cardiac Coronary Angiogram;  Surgeon: Courtney Lee DO;  Location: [facility]; Service: Cardiology   • CARDIAC CATHETERIZATION N/A 2023    Procedure: Cardiac pci;  Surgeon: Courtney Lee DO;  Location: [facility];   Service: Cardiology   •  SECTION       and    • HYSTERECTOMY W/ SALPINGO-OOPHERECTOMY Bilateral     in [facility]       PREVIOUS WEIGHTS:   Wt Readings from Last 10 Encounters:   23 86.3 kg (190 lb 3.2 oz)   23 90 kg (198 lb 6.4 oz)   08/22/23 88.5 kg (195 lb)   07/24/23 89 kg (196 lb 1.6 oz)   07/19/23 88.5 kg (195 lb 3.2 oz)   07/10/23 88.9 kg (196 lb)   06/06/23 93 kg (205 lb)   05/25/23 89.4 kg (197 lb 3.2 oz)   05/19/23 89.9 kg (198 lb 3.2 oz)   05/09/23 94.8 kg (209 lb)        Review of Systems:  Review of Systems   Respiratory: Negative for cough, choking, chest tightness, shortness of breath and wheezing. Cardiovascular: Negative for chest pain, palpitations and leg swelling. Musculoskeletal: Negative for gait problem. Skin: Negative for rash. Neurological: Negative for dizziness, tremors, syncope, weakness, light-headedness, numbness and headaches. Psychiatric/Behavioral: Negative for agitation and behavioral problems. The patient is not hyperactive. Physical Exam:  /68 (BP Location: Right arm, Patient Position: Sitting, Cuff Size: Large)   Pulse 80   Wt 86.3 kg (190 lb 3.2 oz)   BMI 28.09 kg/m²     Physical Exam  Constitutional:       General: She is not in acute distress. Appearance: She is well-developed. HENT:      Head: Normocephalic and atraumatic. Neck:      Thyroid: No thyromegaly. Vascular: No carotid bruit or JVD. Trachea: No tracheal deviation. Cardiovascular:      Rate and Rhythm: Normal rate and regular rhythm. Pulses: Normal pulses. Heart sounds: Normal heart sounds. No murmur heard. No friction rub. No gallop. Pulmonary:      Effort: Pulmonary effort is normal. No respiratory distress. Breath sounds: Normal breath sounds. No wheezing, rhonchi or rales. Chest:      Chest wall: No tenderness. Musculoskeletal:         General: Normal range of motion. Cervical back: Normal range of motion and neck supple. Right lower leg: No edema. Left lower leg: No edema. Skin:     General: Skin is warm and dry. Neurological:      General: No focal deficit present. Mental Status: She is alert and oriented to person, place, and time. Psychiatric:         Mood and Affect: Mood normal.         Behavior: Behavior normal.         Thought Content: Thought content normal.         Judgment: Judgment normal.         ======================================================  Imaging:   I have personally reviewed pertinent reports. I spent 30 minutes on the patient's office visit. This time was spent on the day of the visit. I had direct contact with the patient in the office on the day of the visit. Greater than 50% of the total time was spent obtaining a history, examining patient, answering all patient questions, arranging and discussing plan of care with patient as well as directly providing instructions. Additional time then spent on orders and office chart. Portions of the record may have been created with voice recognition software. Occasional wrong word or "sound a like" substitutions may have occurred due to the inherent limitations of voice recognition software. Read the chart carefully and recognize, using context, where substitutions have occurred.     SIGNATURES:   Luis Elaine MD

## 2023-11-15 DIAGNOSIS — R42 VERTIGO: ICD-10-CM

## 2023-11-15 RX ORDER — MECLIZINE HCL 12.5 MG/1
12.5 TABLET ORAL EVERY 8 HOURS
Qty: 30 TABLET | Refills: 0 | Status: SHIPPED | OUTPATIENT
Start: 2023-11-15

## 2023-12-06 ENCOUNTER — OFFICE VISIT (OUTPATIENT)
Dept: FAMILY MEDICINE CLINIC | Facility: CLINIC | Age: 48
End: 2023-12-06
Payer: COMMERCIAL

## 2023-12-06 VITALS
RESPIRATION RATE: 20 BRPM | OXYGEN SATURATION: 98 % | TEMPERATURE: 97.8 F | WEIGHT: 190.2 LBS | BODY MASS INDEX: 28.17 KG/M2 | SYSTOLIC BLOOD PRESSURE: 108 MMHG | DIASTOLIC BLOOD PRESSURE: 64 MMHG | HEART RATE: 79 BPM | HEIGHT: 69 IN

## 2023-12-06 DIAGNOSIS — E11.9 TYPE 2 DIABETES MELLITUS WITHOUT COMPLICATION, WITHOUT LONG-TERM CURRENT USE OF INSULIN (HCC): Primary | ICD-10-CM

## 2023-12-06 DIAGNOSIS — M54.2 NECK PAIN: ICD-10-CM

## 2023-12-06 DIAGNOSIS — K21.9 GASTROESOPHAGEAL REFLUX DISEASE WITHOUT ESOPHAGITIS: ICD-10-CM

## 2023-12-06 DIAGNOSIS — Z79.890 MENOPAUSAL SYNDROME ON HORMONE REPLACEMENT THERAPY: ICD-10-CM

## 2023-12-06 DIAGNOSIS — N95.1 MENOPAUSAL SYNDROME ON HORMONE REPLACEMENT THERAPY: ICD-10-CM

## 2023-12-06 DIAGNOSIS — I25.10 CORONARY ARTERY DISEASE INVOLVING NATIVE CORONARY ARTERY OF NATIVE HEART WITHOUT ANGINA PECTORIS: ICD-10-CM

## 2023-12-06 LAB — SL AMB POCT HEMOGLOBIN AIC: 7.8 (ref ?–6.5)

## 2023-12-06 PROCEDURE — 83036 HEMOGLOBIN GLYCOSYLATED A1C: CPT | Performed by: PHYSICIAN ASSISTANT

## 2023-12-06 PROCEDURE — 99214 OFFICE O/P EST MOD 30 MIN: CPT | Performed by: PHYSICIAN ASSISTANT

## 2023-12-06 RX ORDER — METOPROLOL SUCCINATE 25 MG/1
25 TABLET, EXTENDED RELEASE ORAL
Qty: 90 TABLET | Refills: 1 | Status: SHIPPED | OUTPATIENT
Start: 2023-12-06 | End: 2024-06-03

## 2023-12-06 RX ORDER — DULOXETIN HYDROCHLORIDE 30 MG/1
30 CAPSULE, DELAYED RELEASE ORAL DAILY
Qty: 90 CAPSULE | Refills: 1 | Status: SHIPPED | OUTPATIENT
Start: 2023-12-06

## 2023-12-06 RX ORDER — FAMOTIDINE 20 MG/1
20 TABLET, FILM COATED ORAL 2 TIMES DAILY PRN
Qty: 60 TABLET | Refills: 5 | Status: SHIPPED | OUTPATIENT
Start: 2023-12-06

## 2023-12-06 RX ORDER — DULAGLUTIDE 3 MG/.5ML
3 INJECTION, SOLUTION SUBCUTANEOUS
Qty: 6 ML | Refills: 1 | Status: SHIPPED | OUTPATIENT
Start: 2023-12-06 | End: 2024-06-03

## 2023-12-06 RX ORDER — METHOCARBAMOL 500 MG/1
500 TABLET, FILM COATED ORAL 2 TIMES DAILY PRN
Qty: 60 TABLET | Refills: 0 | Status: SHIPPED | OUTPATIENT
Start: 2023-12-06

## 2023-12-06 RX ORDER — LANCETS 33 GAUGE
EACH MISCELLANEOUS
Qty: 100 EACH | Refills: 3 | Status: SHIPPED | OUTPATIENT
Start: 2023-12-06

## 2023-12-06 NOTE — ASSESSMENT & PLAN NOTE
Significant hot flashes from the neck up, previously on estrogen patches with improvement was discontinued due to NSTEMI and brand change which was ineffective, started on oral HRT per GYN but required prior authorization and was sent alternative, patient was not aware of alternative sent and did not  from pharmacy. Patient to check with pharmacy and follow-up with GYN.

## 2023-12-06 NOTE — ASSESSMENT & PLAN NOTE
Still not well controlled, stable from previous. Increase dose of Trulicity to 3 mg once weekly. With poor appetite due to Trulicity. 8 pound weight loss since last visit. Continue same dose of Jardiance 25 mg and metformin max dose. Follow-up in 3 months pending tolerance of Trulicity increased dose.   Lab Results   Component Value Date    HGBA1C 7.8 (H) 07/15/2023

## 2023-12-06 NOTE — PROGRESS NOTES
Name: Ender Neely      : 1975      MRN: [id number]  Encounter Provider: Lucho Smith PA-C  Encounter Date: 2023   Encounter department: [address] [place] [address]     1. Type 2 diabetes mellitus without complication, without long-term current use of insulin ([facility])  Assessment & Plan:  Still not well controlled, stable from previous. Increase dose of Trulicity to 3 mg once weekly. With poor appetite due to Trulicity. 8 pound weight loss since last visit. Continue same dose of Jardiance 25 mg and metformin max dose. Follow-up in 3 months pending tolerance of Trulicity increased dose. Lab Results   Component Value Date    HGBA1C 7.8 (H) 07/15/2023       Orders:  -     POCT hemoglobin A1c  -     IRIS Diabetic eye exam  -     Comprehensive metabolic panel; Future  -     Albumin / creatinine urine ratio; Future  -     dulaglutide (Trulicity) 3 VU/2.7QG injection; Inject 0.5 mL (3 mg total) under the skin every 7 days  -     OneTouch Delica Lancets 31C MISC; Check blood sugars once daily. Please substitute with appropriate alternative as covered by patient's insurance. Dx: E11.65  -     glucose blood test strip; Use as instructed  -     Empagliflozin 25 MG TABS; Take 1 tablet (25 mg total) by mouth every morning  -     metFORMIN (GLUCOPHAGE) 1000 MG tablet; Take 1 tablet (1,000 mg total) by mouth 2 (two) times a day with meals    2.  Coronary artery disease involving native coronary artery of native heart without angina pectoris  Assessment & Plan:  Lab Results   Component Value Date    CHOLESTEROL 115 07/15/2023    CHOLESTEROL 268 (H) 2023    CHOLESTEROL 238 (H) 2023     Lab Results   Component Value Date    HDL 34 (L) 07/15/2023    HDL 36 (L) 2023    HDL 40 (L) 2023     Lab Results   Component Value Date    TRIG 377 (H) 07/15/2023    TRIG 1,091 (H) 2023    TRIG 373 (H) 2023     Lab Results   Component Value Date    [address] 81 07/15/2023    [address] 198 02/03/2023     Lab Results   Component Value Date    [address] 6 07/15/2023     With stent, continue DAPT, reviewed last cardiology consult note in 9/2023. Repeat lipid panel. Orders:  -     Lipid panel; Future  -     metoprolol succinate (TOPROL-XL) 25 mg 24 hr tablet; Take 1 tablet (25 mg total) by mouth daily at bedtime    3. Gastroesophageal reflux disease without esophagitis  Assessment & Plan:  Continue famotidine as needed. Orders:  -     famotidine (PEPCID) 20 mg tablet; Take 1 tablet (20 mg total) by mouth 2 (two) times a day as needed for indigestion or heartburn    4. Menopausal syndrome on hormone replacement therapy  Assessment & Plan:  Significant hot flashes from the neck up, previously on estrogen patches with improvement was discontinued due to NSTEMI and brand change which was ineffective, started on oral HRT per GYN but required prior authorization and was sent alternative, patient was not aware of alternative sent and did not  from pharmacy. Patient to check with pharmacy and follow-up with GYN. Orders:  -     DULoxetine (CYMBALTA) 30 mg delayed release capsule; Take 1 capsule (30 mg total) by mouth daily    5. Neck pain  Assessment & Plan:  Continue Robaxin as needed. Refilled today. Orders:  -     methocarbamol (ROBAXIN) 500 mg tablet; Take 1 tablet (500 mg total) by mouth 2 (two) times a day as needed for muscle spasms [place] 1 tableta dos veces al jose si necesita para dolor muscular           Subjective      Sarah Milligan is a 50 y.o. female with a h/o diabetes, HLD, HTN, menopause, hx NSTEMI s/p stent for CAD. She continues to have intolerable hot flashes mainly at night and during the day as well from neck up. Recently ranging 170s in the morning. Will drop to 110s after meals. No hypoglycemic episodes. Never got the alternative HRT med from GYN. Would like to switch to this office for GYN due to location.  No appetite for meat.    History was conducted in [place] without the use of . Review of Systems   Constitutional:  Negative for chills, fever and unexpected weight change. Respiratory:  Negative for shortness of breath. Cardiovascular:  Negative for chest pain. Endocrine: Positive for heat intolerance. Genitourinary:  Negative for frequency, menstrual problem, pelvic pain and vaginal bleeding. Current Outpatient Medications on File Prior to Visit   Medication Sig   • Alcohol Swabs (Pharmacist Choice Alcohol) PADS Use in the morning   • aspirin 81 mg chewable tablet Chew 1 tablet (81 mg total) daily   • Blood Glucose Monitoring Suppl (OneTouch Verio Reflect) w/Device KIT Check blood sugars once daily. Please substitute with appropriate alternative as covered by patient's insurance.  Dx: E11.65   • ergocalciferol (VITAMIN D2) 50,000 units Take 1 capsule (50,000 Units total) by mouth once a week   • meclizine (ANTIVERT) 12.5 MG tablet TAKE ONE TABLET BY MOUTH EVERY 8 HOURS   • norethindrone-ethinyl estradiol (Femhrt) 0.5-2.5 MG-MCG per tablet Take 1 tablet by mouth daily   • rosuvastatin (CRESTOR) 20 MG tablet Take 1 tablet (20 mg total) by mouth daily   • ticagrelor (Brilinta) 90 MG Take 1 tablet (90 mg total) by mouth every 12 (twelve) hours   • [DISCONTINUED] dulaglutide (Trulicity) 1.5 AV/8.0NW injection Inject 0.5 mL (1.5 mg total) under the skin every 7 days   • [DISCONTINUED] DULoxetine (CYMBALTA) 30 mg delayed release capsule    • [DISCONTINUED] Empagliflozin 25 MG TABS Take 1 tablet (25 mg total) by mouth every morning   • [DISCONTINUED] famotidine (PEPCID) 20 mg tablet Take 1 tablet (20 mg total) by mouth 2 (two) times a day as needed for indigestion or heartburn   • [DISCONTINUED] glucose blood test strip Use as instructed   • [DISCONTINUED] metFORMIN (GLUCOPHAGE) 1000 MG tablet Take 1 tablet (1,000 mg total) by mouth 2 (two) times a day with meals   • [DISCONTINUED] metoprolol succinate (TOPROL-XL) 25 mg 24 hr tablet Take 1 tablet (25 mg total) by mouth daily at bedtime   • [DISCONTINUED] OneTouch Delica Lancets 76K MISC Check blood sugars once daily. Please substitute with appropriate alternative as covered by patient's insurance. Dx: E11.65   • losartan (COZAAR) 50 mg tablet Take 1 tablet (50 mg total) by mouth daily       Objective     /64 (BP Location: Left arm, Patient Position: Sitting, Cuff Size: Standard)   Pulse 79   Temp 97.8 °F (36.6 °C) (Temporal)   Resp 20   Ht 5' 9" (1.753 m)   Wt 86.3 kg (190 lb 3.2 oz)   SpO2 98%   BMI 28.09 kg/m²     Physical Exam  Vitals and nursing note reviewed. Constitutional:       Appearance: Normal appearance. Comments: Facial sweating and flushing sudden onset with hot flash   HENT:      Head: Normocephalic and atraumatic. Cardiovascular:      Rate and Rhythm: Normal rate and regular rhythm. Pulses: Normal pulses. Heart sounds: Normal heart sounds. Pulmonary:      Effort: Pulmonary effort is normal.      Breath sounds: Normal breath sounds. Neurological:      Mental Status: She is alert and oriented to person, place, and time. Mental status is at baseline.        Satnam Velasco PA-C

## 2023-12-06 NOTE — ASSESSMENT & PLAN NOTE
Lab Results   Component Value Date    CHOLESTEROL 115 07/15/2023    CHOLESTEROL 268 (H) 05/08/2023    CHOLESTEROL 238 (H) 02/03/2023     Lab Results   Component Value Date    HDL 34 (L) 07/15/2023    HDL 36 (L) 05/08/2023    HDL 40 (L) 02/03/2023     Lab Results   Component Value Date    TRIG 377 (H) 07/15/2023    TRIG 1,091 (H) 05/08/2023    TRIG 373 (H) 02/03/2023     Lab Results   Component Value Date    [address] 07/15/2023    [address] 198 02/03/2023     Lab Results   Component Value Date    LDLCALC 6 07/15/2023     With stent, continue DAPT, reviewed last cardiology consult note in 9/2023. Repeat lipid panel.

## 2024-02-12 ENCOUNTER — TELEPHONE (OUTPATIENT)
Dept: CARDIOLOGY CLINIC | Facility: CLINIC | Age: 49
End: 2024-02-12

## 2024-02-12 NOTE — TELEPHONE ENCOUNTER
Phone call to patient.  Spoke with son.  Per Dr. Pruitt would like her to get blood work done Tues or Wed fasting. Prior to her Wed afternoon appointment.    Patient and son understood instructions.

## 2024-02-12 NOTE — TELEPHONE ENCOUNTER
----- Message from Harshil Pruitt MD sent at 2/11/2024  6:23 AM EST -----  Please call patient and tell her to have her blood work ordered in December by Dr. Bill done either today or tomorrow.  It needs to be fasting so maybe she needs it done tomorrow.  However she has an appointment to see me on Wednesday and I need the results prior to seeing her especially the lipid panel.  She could even have them done early Wednesday morning as long as it was early since her appointment is not until 4:20 PM.  Her last triglycerides were high and if they continue to be high, she needs a change in her medication.  I cannot make a change without recent values.  Her last results were in July.    Thanks.  Harshil

## 2024-02-14 ENCOUNTER — APPOINTMENT (OUTPATIENT)
Dept: LAB | Facility: HOSPITAL | Age: 49
End: 2024-02-14
Payer: COMMERCIAL

## 2024-02-14 ENCOUNTER — OFFICE VISIT (OUTPATIENT)
Dept: CARDIOLOGY CLINIC | Facility: CLINIC | Age: 49
End: 2024-02-14
Payer: COMMERCIAL

## 2024-02-14 VITALS
SYSTOLIC BLOOD PRESSURE: 110 MMHG | DIASTOLIC BLOOD PRESSURE: 80 MMHG | WEIGHT: 196 LBS | BODY MASS INDEX: 28.94 KG/M2 | HEART RATE: 64 BPM

## 2024-02-14 DIAGNOSIS — E11.9 TYPE 2 DIABETES MELLITUS WITHOUT COMPLICATION, WITHOUT LONG-TERM CURRENT USE OF INSULIN (HCC): ICD-10-CM

## 2024-02-14 DIAGNOSIS — I25.10 CORONARY ARTERY DISEASE INVOLVING NATIVE CORONARY ARTERY OF NATIVE HEART WITHOUT ANGINA PECTORIS: Primary | ICD-10-CM

## 2024-02-14 DIAGNOSIS — E78.1 HYPERTRIGLYCERIDEMIA: ICD-10-CM

## 2024-02-14 DIAGNOSIS — I10 PRIMARY HYPERTENSION: ICD-10-CM

## 2024-02-14 DIAGNOSIS — Z95.5 HISTORY OF HEART ARTERY STENT: ICD-10-CM

## 2024-02-14 DIAGNOSIS — I25.2 HISTORY OF NON-ST ELEVATION MYOCARDIAL INFARCTION (NSTEMI): ICD-10-CM

## 2024-02-14 DIAGNOSIS — I25.10 CORONARY ARTERY DISEASE INVOLVING NATIVE CORONARY ARTERY OF NATIVE HEART WITHOUT ANGINA PECTORIS: ICD-10-CM

## 2024-02-14 DIAGNOSIS — E11.00 TYPE 2 DIABETES MELLITUS WITH HYPEROSMOLARITY WITHOUT COMA, UNSPECIFIED WHETHER LONG TERM INSULIN USE (HCC): ICD-10-CM

## 2024-02-14 LAB
ALBUMIN SERPL BCP-MCNC: 4.8 G/DL (ref 3.5–5)
ALP SERPL-CCNC: 42 U/L (ref 34–104)
ALT SERPL W P-5'-P-CCNC: 11 U/L (ref 7–52)
ANION GAP SERPL CALCULATED.3IONS-SCNC: 9 MMOL/L
AST SERPL W P-5'-P-CCNC: 11 U/L (ref 13–39)
BILIRUB SERPL-MCNC: 0.5 MG/DL (ref 0.2–1)
BUN SERPL-MCNC: 17 MG/DL (ref 5–25)
CALCIUM SERPL-MCNC: 9.8 MG/DL (ref 8.4–10.2)
CHLORIDE SERPL-SCNC: 107 MMOL/L (ref 96–108)
CHOLEST SERPL-MCNC: 161 MG/DL
CO2 SERPL-SCNC: 25 MMOL/L (ref 21–32)
CREAT SERPL-MCNC: 0.86 MG/DL (ref 0.6–1.3)
CREAT UR-MCNC: 68.6 MG/DL
GFR SERPL CREATININE-BSD FRML MDRD: 80 ML/MIN/1.73SQ M
GLUCOSE P FAST SERPL-MCNC: 149 MG/DL (ref 65–99)
HDLC SERPL-MCNC: 49 MG/DL
LDLC SERPL CALC-MCNC: 68 MG/DL (ref 0–100)
MICROALBUMIN UR-MCNC: 14.3 MG/L
MICROALBUMIN/CREAT 24H UR: 21 MG/G CREATININE (ref 0–30)
NONHDLC SERPL-MCNC: 112 MG/DL
POTASSIUM SERPL-SCNC: 4.6 MMOL/L (ref 3.5–5.3)
PROT SERPL-MCNC: 7.5 G/DL (ref 6.4–8.4)
SODIUM SERPL-SCNC: 141 MMOL/L (ref 135–147)
TRIGL SERPL-MCNC: 219 MG/DL

## 2024-02-14 PROCEDURE — 82570 ASSAY OF URINE CREATININE: CPT

## 2024-02-14 PROCEDURE — 82043 UR ALBUMIN QUANTITATIVE: CPT

## 2024-02-14 PROCEDURE — 80053 COMPREHEN METABOLIC PANEL: CPT

## 2024-02-14 PROCEDURE — 36415 COLL VENOUS BLD VENIPUNCTURE: CPT

## 2024-02-14 PROCEDURE — 99214 OFFICE O/P EST MOD 30 MIN: CPT | Performed by: INTERNAL MEDICINE

## 2024-02-14 PROCEDURE — 80061 LIPID PANEL: CPT

## 2024-02-14 NOTE — PROGRESS NOTES
[facility]  [address] [place] [address]  Phone#  [phone]   Fax#  [phone]  *-*-*-*-*-*-*-*-*-*-*-*-*-*-*-*-*-*-*-*-*-*-*-*-*-*-*-*-*-*-*-*-*-*-*-*-*-*-*-*-*-*-*-*-*-*-*-*-*-*-*-*-*-*                                   Cardiology Follow Up      ENCOUNTER DATE: 24 5:10 PM  PATIENT NAME: Jena Ching   : 1975    MRN: [id number]  AGE:48 y.o.      SEX: female  ENCOUNTER PROVIDER:Harshil Pruitt MD     PRIMARY CARE PHYSICIAN: Vandana Bill PA-C    ACTIVE DIAGNOSIS THIS VISIT  1. Coronary artery disease involving native coronary artery of native heart without angina pectoris        2. Hypertriglyceridemia        3. Type 2 diabetes mellitus with hyperosmolarity without coma, unspecified whether long term insulin use (HCC)        4. Primary hypertension        5. History of proximal LAD stent 2023        6. History of non-ST elevation myocardial infarction (NSTEMI)          ACTIVE PROBLEM LIST  Patient Active Problem List   Diagnosis    Hypertriglyceridemia    Menopausal syndrome on hormone replacement therapy    Type 2 diabetes mellitus (HCC)    Gastroesophageal reflux disease without esophagitis    Neuropathy    Right foot pain    Overweight (BMI 25.0-29.9)    Vertigo    Neck pain    Headache    Primary hypertension    Coronary artery disease involving native coronary artery of native heart without angina pectoris    History of non-ST elevation myocardial infarction (NSTEMI)    History of proximal LAD stent 2023       CARDIOLOGY SPECIALTY COMMENTS  2023-5/10/2023 [facility] patient hospitalized with NSTEMI, peak troponin 213.  Other diagnoses include primary hypertension, headache, type 2 diabetes mellitus, hypertriglyceridemia    2023 echocardiogram precardiac catheterization: LVEF 54% with grade 1 diastolic dysfunction.  GLS reduced at -12.7% anterior apical, anterior septal and apical, inferior apical, inferior lateral and apical  hypokinesis.    5/9/2023 cardiac catheterization: Proximal LAD before the first diagonal and septal  95% treated with BRUNO 35 mm x 26 mm to 14 sandip with residual 0% lesion.  Distal circumflex 80% lesion in a small territory.  RCA dominant with proximal 35%.    INTERVAL HISTORY:        Patient is a 48-year-old female immigrant who is now working for [employer].  Her English is still poor but every time I see her, it is improving.  She underwent a myocardial infarction on 5/9/2023 and had a stent placed in a 95% proximal LAD lesion.  Since then she has been pain-free.  She denies shortness of breath.  She feels good.    Her blood pressure is excellent at 110/80.  Her triglycerides have come down from 1091 to 219 with blood sugar control and exercise.  I have also encouraged her to lose weight and she had lost some but gained t it back over the holidays.  Her LDL could not originally be calculated.  However presently it is 68.  Her total cholesterol has dropped from 268  to  161.  Her cholesterol had been lower at 115 and I wonder whether she is sometimes forgetting her rosuvastatin    DISCUSSION/PLAN:          Continue losartan 50 mg daily  Continue metoprolol succinate 25 mg daily  Continue rosuvastatin 20 mg daily  Continue Brilinta 90 mg every 12 hours  Continue aspirin 81 mg daily  Return in May 2024  Reduce Brilinta to 60 mg every 12 hours after that visit and May  EKG on return    Lab Studies:    Lab Results   Component Value Date    CHOLESTEROL 161 02/14/2024    CHOLESTEROL 115 07/15/2023    CHOLESTEROL 268 (H) 05/08/2023     Lab Results   Component Value Date    TRIG 219 (H) 02/14/2024    TRIG 377 (H) 07/15/2023    TRIG 1,091 (H) 05/08/2023     Lab Results   Component Value Date    HDL 49 (L) 02/14/2024    HDL 34 (L) 07/15/2023    HDL 36 (L) 05/08/2023     Lab Results   Component Value Date    LDLCALC 68 02/14/2024    LDLCALC 6 07/15/2023    LDLCALC  05/08/2023      Comment:      Calculated LDL  invalid, triglycerides >400 mg/dl       Lab Results   Component Value Date    LDLDIRECT 111 (H) 05/08/2023     Lab Results   Component Value Date    HGBA1C 7.8 (A) 12/06/2023      Lab Results   Component Value Date    EGFR 80 02/14/2024    EGFR 79 07/15/2023    EGFR 108 05/10/2023    SODIUM 141 02/14/2024    SODIUM 138 07/15/2023    SODIUM 137 05/10/2023    K 4.6 02/14/2024    K 4.0 07/15/2023    K 3.7 05/10/2023     02/14/2024     07/15/2023     05/10/2023    CO2 25 02/14/2024    CO2 21 07/15/2023    CO2 21 05/10/2023    BUN 17 02/14/2024    BUN 18 07/15/2023    BUN 9 05/10/2023    CREATININE 0.86 02/14/2024    CREATININE 0.87 07/15/2023    CREATININE 0.61 05/10/2023     Lab Results   Component Value Date    WBC 6.81 07/15/2023    WBC 5.34 05/10/2023    WBC 5.70 05/09/2023    HGB 12.5 07/15/2023    HGB 11.7 05/10/2023    HGB 11.2 (L) 05/09/2023    HCT 39.9 07/15/2023    HCT 35.8 05/10/2023    HCT 33.7 (L) 05/09/2023    MCV 89 07/15/2023    MCV 87 05/10/2023    MCV 88 05/09/2023    MCH 27.8 07/15/2023    MCH 28.4 05/10/2023    MCH 29.2 05/09/2023    MCHC 31.3 (L) 07/15/2023    MCHC 32.7 05/10/2023    MCHC 33.2 05/09/2023     07/15/2023     05/10/2023     05/09/2023      Lab Results   Component Value Date    CALCIUM 9.8 02/14/2024    CALCIUM 9.3 07/15/2023    CALCIUM 8.6 05/10/2023    AST 11 (L) 02/14/2024    AST 15 07/15/2023    AST 20 02/03/2023    ALT 11 02/14/2024    ALT 15 07/15/2023    ALT 17 02/03/2023    ALKPHOS 42 02/14/2024    ALKPHOS 50 07/15/2023    ALKPHOS 58 02/03/2023    MG 1.9 05/10/2023    MG 1.9 05/09/2023     No results found for this visit on 02/14/24.      Current Outpatient Medications:     Alcohol Swabs (Pharmacist Choice Alcohol) PADS, Use in the morning, Disp: 100 each, Rfl: 3    aspirin 81 mg chewable tablet, Chew 1 tablet (81 mg total) daily, Disp: 90 tablet, Rfl: 3    Blood Glucose Monitoring Suppl (OneTouch Verio Reflect) w/Device KIT, Check blood  sugars once daily. Please substitute with appropriate alternative as covered by patient's insurance. Dx: E11.65, Disp: 1 kit, Rfl: 0    dulaglutide (Trulicity) 3 MG/0.5ML injection, Inject 0.5 mL (3 mg total) under the skin every 7 days, Disp: 6 mL, Rfl: 1    DULoxetine (CYMBALTA) 30 mg delayed release capsule, Take 1 capsule (30 mg total) by mouth daily, Disp: 90 capsule, Rfl: 1    Empagliflozin 25 MG TABS, Take 1 tablet (25 mg total) by mouth every morning, Disp: 90 tablet, Rfl: 1    ergocalciferol (VITAMIN D2) 50,000 units, Take 1 capsule (50,000 Units total) by mouth once a week, Disp: 12 capsule, Rfl: 1    famotidine (PEPCID) 20 mg tablet, Take 1 tablet (20 mg total) by mouth 2 (two) times a day as needed for indigestion or heartburn, Disp: 60 tablet, Rfl: 5    glucose blood test strip, Use as instructed, Disp: 100 strip, Rfl: 3    losartan (COZAAR) 50 mg tablet, Take 1 tablet (50 mg total) by mouth daily, Disp: 90 tablet, Rfl: 2    meclizine (ANTIVERT) 12.5 MG tablet, TAKE ONE TABLET BY MOUTH EVERY 8 HOURS, Disp: 30 tablet, Rfl: 0    metFORMIN (GLUCOPHAGE) 1000 MG tablet, Take 1 tablet (1,000 mg total) by mouth 2 (two) times a day with meals, Disp: 180 tablet, Rfl: 3    methocarbamol (ROBAXIN) 500 mg tablet, Take 1 tablet (500 mg total) by mouth 2 (two) times a day as needed for muscle spasms [place] 1 tableta dos veces al jose si necesita para dolor muscular, Disp: 60 tablet, Rfl: 0    metoprolol succinate (TOPROL-XL) 25 mg 24 hr tablet, Take 1 tablet (25 mg total) by mouth daily at bedtime, Disp: 90 tablet, Rfl: 1    norethindrone-ethinyl estradiol (Femhrt) 0.5-2.5 MG-MCG per tablet, Take 1 tablet by mouth daily, Disp: 30 tablet, Rfl: 4    OneTouch Delica Lancets 33G MISC, Check blood sugars once daily. Please substitute with appropriate alternative as covered by patient's insurance. Dx: E11.65, Disp: 100 each, Rfl: 3    rosuvastatin (CRESTOR) 20 MG tablet, Take 1 tablet (20 mg total) by mouth daily, Disp: 90  tablet, Rfl: 3    ticagrelor (Brilinta) 90 MG, Take 1 tablet (90 mg total) by mouth every 12 (twelve) hours, Disp: 60 tablet, Rfl: 11  No Known Allergies    Past Medical History:   Diagnosis Date    Coronary artery disease 2023    Diabetes mellitus (HCC)     GERD (gastroesophageal reflux disease)     Hypertension     NSTEMI (non-ST elevated myocardial infarction) ([facility]) 2023    Vertigo      Social History     Socioeconomic History    Marital status: /Civil Union     Spouse name: Not on file    Number of children: Not on file    Years of education: Not on file    Highest education level: Not on file   Occupational History    Not on file   Tobacco Use    Smoking status: Never    Smokeless tobacco: Never   Vaping Use    Vaping status: Never Used   Substance and Sexual Activity    Alcohol use: Never    Drug use: Never    Sexual activity: Not Currently     Partners: Male     Birth control/protection: Female Sterilization   Other Topics Concern    Not on file   Social History Narrative    Not on file     Social Determinants of Health     Financial Resource Strain: Not on file   Food Insecurity: Not on file   Transportation Needs: Not on file   Physical Activity: Not on file   Stress: Not on file   Social Connections: Not on file   Intimate Partner Violence: Not on file   Housing Stability: Not on file      No family history on file.  Past Surgical History:   Procedure Laterality Date    CARDIAC CATHETERIZATION N/A 2023    Procedure: Cardiac catheterization;  Surgeon: Ian Carlson DO;  Location: [facility];  Service: Cardiology    CARDIAC CATHETERIZATION N/A 2023    Procedure: Cardiac Coronary Angiogram;  Surgeon: Ian Carlson DO;  Location: [facility];  Service: Cardiology    CARDIAC CATHETERIZATION N/A 2023    Procedure: Cardiac pci;  Surgeon: Ian Carlson DO;  Location: [facility];  Service: Cardiology     SECTION       and      HYSTERECTOMY W/ SALPINGO-OOPHERECTOMY Bilateral     in [facility] 2011       PREVIOUS WEIGHTS:   Wt Readings from Last 10 Encounters:   02/14/24 88.9 kg (196 lb)   12/06/23 86.3 kg (190 lb 3.2 oz)   09/22/23 86.3 kg (190 lb 3.2 oz)   09/06/23 90 kg (198 lb 6.4 oz)   08/22/23 88.5 kg (195 lb)   07/24/23 89 kg (196 lb 1.6 oz)   07/19/23 88.5 kg (195 lb 3.2 oz)   07/10/23 88.9 kg (196 lb)   06/06/23 93 kg (205 lb)   05/25/23 89.4 kg (197 lb 3.2 oz)        Review of Systems:  Review of Systems   Respiratory:  Negative for cough, choking, chest tightness, shortness of breath and wheezing.    Cardiovascular:  Negative for chest pain, palpitations and leg swelling.   Musculoskeletal:  Negative for gait problem.   Skin:  Negative for rash.   Neurological:  Negative for dizziness, tremors, syncope, weakness, light-headedness, numbness and headaches.   Psychiatric/Behavioral:  Negative for agitation and behavioral problems. The patient is not hyperactive.        Physical Exam:  /80 (BP Location: Left arm, Patient Position: Sitting, Cuff Size: Adult)   Pulse 64   Wt 88.9 kg (196 lb)   BMI 28.94 kg/m²     Physical Exam  Constitutional:       General: She is not in acute distress.     Appearance: She is well-developed.   HENT:      Head: Normocephalic and atraumatic.   Neck:      Thyroid: No thyromegaly.      Vascular: No carotid bruit or JVD.      Trachea: No tracheal deviation.   Cardiovascular:      Rate and Rhythm: Normal rate and regular rhythm.      Pulses: Normal pulses.      Heart sounds: Normal heart sounds. No murmur heard.     No friction rub. No gallop.   Pulmonary:      Effort: Pulmonary effort is normal. No respiratory distress.      Breath sounds: Normal breath sounds. No wheezing, rhonchi or rales.   Chest:      Chest wall: No tenderness.   Musculoskeletal:         General: Normal range of motion.      Cervical back: Normal range of motion and neck supple.      Right lower leg: No edema.      Left lower  "leg: No edema.   Skin:     General: Skin is warm and dry.   Neurological:      General: No focal deficit present.      Mental Status: She is alert and oriented to person, place, and time.      Gait: Gait normal.   Psychiatric:         Mood and Affect: Mood normal.         Behavior: Behavior normal.         Thought Content: Thought content normal.         Judgment: Judgment normal.       ======================================================  Imaging:   I have personally reviewed pertinent reports.      Portions of the record may have been created with voice recognition software. Occasional wrong word or \"sound a like\" substitutions may have occurred due to the inherent limitations of voice recognition software. Read the chart carefully and recognize, using context, where substitutions have occurred.    SIGNATURES:   Harshil Pruitt MD   "

## 2024-04-09 DIAGNOSIS — R42 VERTIGO: ICD-10-CM

## 2024-04-17 RX ORDER — MECLIZINE HCL 12.5 MG/1
12.5 TABLET ORAL EVERY 8 HOURS
Qty: 30 TABLET | Refills: 0 | Status: SHIPPED | OUTPATIENT
Start: 2024-04-17

## 2024-05-07 DIAGNOSIS — E11.9 TYPE 2 DIABETES MELLITUS WITHOUT COMPLICATION, WITHOUT LONG-TERM CURRENT USE OF INSULIN (HCC): ICD-10-CM

## 2024-05-07 RX ORDER — DULAGLUTIDE 3 MG/.5ML
3 INJECTION, SOLUTION SUBCUTANEOUS
Qty: 6 ML | Refills: 1 | Status: SHIPPED | OUTPATIENT
Start: 2024-05-07 | End: 2024-11-03

## 2024-05-15 ENCOUNTER — OFFICE VISIT (OUTPATIENT)
Dept: FAMILY MEDICINE CLINIC | Facility: CLINIC | Age: 49
End: 2024-05-15
Payer: COMMERCIAL

## 2024-05-15 VITALS
SYSTOLIC BLOOD PRESSURE: 114 MMHG | OXYGEN SATURATION: 98 % | BODY MASS INDEX: 29.56 KG/M2 | RESPIRATION RATE: 17 BRPM | TEMPERATURE: 97.6 F | HEART RATE: 71 BPM | HEIGHT: 69 IN | WEIGHT: 199.6 LBS | DIASTOLIC BLOOD PRESSURE: 72 MMHG

## 2024-05-15 DIAGNOSIS — E78.1 HYPERTRIGLYCERIDEMIA: ICD-10-CM

## 2024-05-15 DIAGNOSIS — Z00.00 ANNUAL PHYSICAL EXAM: Primary | ICD-10-CM

## 2024-05-15 DIAGNOSIS — I25.10 CORONARY ARTERY DISEASE INVOLVING NATIVE CORONARY ARTERY OF NATIVE HEART WITHOUT ANGINA PECTORIS: ICD-10-CM

## 2024-05-15 DIAGNOSIS — I10 PRIMARY HYPERTENSION: ICD-10-CM

## 2024-05-15 DIAGNOSIS — Z12.11 SCREENING FOR COLON CANCER: ICD-10-CM

## 2024-05-15 DIAGNOSIS — E55.9 VITAMIN D DEFICIENCY: ICD-10-CM

## 2024-05-15 DIAGNOSIS — G62.9 NEUROPATHY: ICD-10-CM

## 2024-05-15 DIAGNOSIS — E11.9 TYPE 2 DIABETES MELLITUS WITHOUT COMPLICATION, WITHOUT LONG-TERM CURRENT USE OF INSULIN (HCC): ICD-10-CM

## 2024-05-15 LAB
LEFT EYE DIABETIC RETINOPATHY: NORMAL
LEFT EYE IMAGE QUALITY: NORMAL
LEFT EYE MACULAR EDEMA: NORMAL
LEFT EYE OTHER RETINOPATHY: NORMAL
RIGHT EYE DIABETIC RETINOPATHY: NORMAL
RIGHT EYE IMAGE QUALITY: NORMAL
RIGHT EYE MACULAR EDEMA: NORMAL
RIGHT EYE OTHER RETINOPATHY: NORMAL
SEVERITY (EYE EXAM): NORMAL

## 2024-05-15 PROCEDURE — 99214 OFFICE O/P EST MOD 30 MIN: CPT | Performed by: PHYSICIAN ASSISTANT

## 2024-05-15 PROCEDURE — 83036 HEMOGLOBIN GLYCOSYLATED A1C: CPT | Performed by: PHYSICIAN ASSISTANT

## 2024-05-15 PROCEDURE — 99396 PREV VISIT EST AGE 40-64: CPT | Performed by: PHYSICIAN ASSISTANT

## 2024-05-15 RX ORDER — ERGOCALCIFEROL 1.25 MG/1
50000 CAPSULE ORAL WEEKLY
Qty: 12 CAPSULE | Refills: 1 | Status: SHIPPED | OUTPATIENT
Start: 2024-05-15

## 2024-05-15 NOTE — PROGRESS NOTES
Adult Annual Physical  Name: Jena Ching      : 1975      MRN: [id number]  Encounter Provider: Vandana Bill PA-C  Encounter Date: 5/15/2024   Encounter department: [facility]    Assessment & Plan   1. Annual physical exam  2. Type 2 diabetes mellitus without complication, without long-term current use of insulin (HCC)  Assessment & Plan:  Lab Results   Component Value Date    HGBA1C 8.0 (A) 2024     Had been improving with Trulicity but no refills for 2 months due to insurance issue. Continue metformin max dose and Jardiance 25mg. Reordered Trulicity and sent PA request. Continue low carb diet.   Lab Results   Component Value Date    HGBA1C 7.8 (A) 2023     Orders:  -     POCT hemoglobin A1c  -     IRIS Diabetic eye exam  3. Neuropathy  Assessment & Plan:  R foot. Continue Cymbalta daily. Maintain glucose control.   Orders:  -     Vitamin B12; Future  -     CBC and differential; Future; Expected date: 05/15/2024  4. Primary hypertension  Assessment & Plan:  Well-controlled on losartan and metoprolol. Continue same dose. Recommend weight loss. Recommend DASH and low salt diet. Discussed importance of diet/exercise.     5. Coronary artery disease involving native coronary artery of native heart without angina pectoris  Assessment & Plan:  No chest pain. Reviewed last cardiology consult note in 2024. Hx of MI in 2023 and stent placed in 95% proximal LAD lesion. Continue DAPT.   6. Hypertriglyceridemia  Assessment & Plan:  Lab Results   Component Value Date    CHOLESTEROL 161 2024    CHOLESTEROL 115 07/15/2023    CHOLESTEROL 268 (H) 2023     Lab Results   Component Value Date    HDL 49 (L) 2024    HDL 34 (L) 07/15/2023    HDL 36 (L) 2023     Lab Results   Component Value Date    TRIG 219 (H) 2024    TRIG 377 (H) 07/15/2023    TRIG 1,091 (H) 2023     Lab Results   Component Value Date    NONHDLC 112  02/14/2024    NONHDLC 81 07/15/2023    NONHDLC 198 02/03/2023     Improved on rosuvastatin 20mg, takes every other day because does not like taking too many medications. Recommend restart daily dosing for better control.   7. Vitamin D deficiency  Assessment & Plan:  2/3/23  7:25 AM    Vit D, 25-Hydroxy  30.0 - 100.0 ng/mL 17.6 Low      Continue high dose vitamin D2 weekly for now. Repeat lab.   Orders:  -     ergocalciferol (VITAMIN D2) 50,000 units; Take 1 capsule (50,000 Units total) by mouth once a week  -     Vitamin D 25 hydroxy; Future; Expected date: 05/15/2024  8. Screening for colon cancer  -     Cologuard    Immunizations and preventive care screenings were discussed with patient today. Appropriate education was printed on patient's after visit summary.    Counseling:  Alcohol/drug use: discussed moderation in alcohol intake, the recommendations for healthy alcohol use, and avoidance of illicit drug use.  Dental Health: discussed importance of regular tooth brushing, flossing, and dental visits.  Injury prevention: discussed safety/seat belts, safety helmets, smoke detectors, carbon dioxide detectors, and smoking near bedding or upholstery.  Sexual health: discussed sexually transmitted diseases, partner selection, use of condoms, avoidance of unintended pregnancy, and contraceptive alternatives.  Exercise: the importance of regular exercise/physical activity was discussed. Recommend exercise 3-5 times per week for at least 30 minutes.          History of Present Illness   {Disappearing Hyperlinks I Encounters * My Last Note * Since Last Visit * History :66215}  Adult Annual Physical:  Patient presents for annual physical.     Diet and Physical Activity:  - Diet/Nutrition: well balanced diet.  - Exercise: walking.    Depression Screening:  - PHQ-2 Score: 0    General Health:  - Sleep: sleeps well.  - Hearing: normal hearing bilateral ears.  - Vision: no vision problems.  - Dental: no dental visits for > 1  "year.    /GYN Health:  - Follows with GYN: yes.   - Menopause: postmenopausal.   - History of STDs: no  - Contraception: menopause.      Advanced Care Planning:  - Has an advanced directive?: no    - Has a durable medical POA?: no    - ACP document given to patient?: no      Review of Systems   Constitutional:  Negative for chills and fever.   HENT:  Negative for ear pain and sore throat.    Eyes:  Negative for pain and visual disturbance.   Respiratory:  Negative for cough and shortness of breath.    Cardiovascular:  Negative for chest pain and palpitations.   Gastrointestinal:  Negative for abdominal pain and vomiting.   Genitourinary:  Negative for dysuria and hematuria.   Musculoskeletal:  Negative for arthralgias and back pain.   Skin:  Negative for color change and rash.   Neurological:  Negative for seizures and syncope.   All other systems reviewed and are negative.    Medical History Reviewed by provider this encounter:  Tobacco  Allergies  Meds  Problems  Med Hx  Surg Hx  Fam Hx         Objective   {Disappearing Hyperlinks   Review Vitals * Enter New Vitals * Results Review * Labs * Imaging * Cardiology * Procedures * Lung Cancer Screening :54155}  /72 (BP Location: Left arm, Patient Position: Sitting, Cuff Size: Standard)   Pulse 71   Temp 97.6 °F (36.4 °C) (Tympanic)   Resp 17   Ht 5' 9\" (1.753 m)   Wt 90.5 kg (199 lb 9.6 oz)   SpO2 98%   BMI 29.48 kg/m²     Physical Exam  Vitals and nursing note reviewed.   Constitutional:       General: She is not in acute distress.     Appearance: She is well-developed.   HENT:      Head: Normocephalic and atraumatic.      Right Ear: Tympanic membrane, ear canal and external ear normal.      Left Ear: Tympanic membrane, ear canal and external ear normal.   Eyes:      Extraocular Movements: Extraocular movements intact.      Conjunctiva/sclera: Conjunctivae normal.      Pupils: Pupils are equal, round, and reactive to light.   Cardiovascular:     "  Rate and Rhythm: Normal rate and regular rhythm.      Pulses: no weak pulses.           Dorsalis pedis pulses are 2+ on the right side and 2+ on the left side.        Posterior tibial pulses are 2+ on the right side and 2+ on the left side.      Heart sounds: No murmur heard.  Pulmonary:      Effort: Pulmonary effort is normal. No respiratory distress.      Breath sounds: Normal breath sounds.   Abdominal:      Palpations: Abdomen is soft.      Tenderness: There is no abdominal tenderness.   Musculoskeletal:         General: No swelling.      Cervical back: Neck supple.        Feet:    Feet:      Right foot:      Skin integrity: No ulcer, skin breakdown, erythema, warmth, callus or dry skin.      Left foot:      Skin integrity: No ulcer, skin breakdown, erythema, warmth, callus or dry skin.   Skin:     General: Skin is warm and dry.      Capillary Refill: Capillary refill takes less than 2 seconds.   Neurological:      Mental Status: She is alert.   Psychiatric:         Mood and Affect: Mood normal.     Diabetic Foot Exam    Patient's shoes and socks removed.    Right Foot/Ankle   Right Foot Inspection  Skin Exam: skin normal and skin intact. No dry skin, no warmth, no callus, no erythema, no maceration, no abnormal color, no pre-ulcer, no ulcer and no callus.     Toe Exam: ROM and strength within normal limits.     Sensory   Monofilament testing: diminished    Vascular  Capillary refills: < 3 seconds  The right DP pulse is 2+. The right PT pulse is 2+.     Left Foot/Ankle  Left Foot Inspection  Skin Exam: skin normal and skin intact. No dry skin, no warmth, no erythema, no maceration, normal color, no pre-ulcer, no ulcer and no callus.     Toe Exam: ROM and strength within normal limits.     Sensory   Monofilament testing: intact    Vascular  Capillary refills: < 3 seconds  The left DP pulse is 2+. The left PT pulse is 2+.     Assign Risk Category  No deformity present  No loss of protective sensation  No weak  pulses  Risk: 0        Administrative Statements {Disappearing Hyperlinks I  Level of Service * [facility]:90149}  I have spent a total time of 30 minutes on 05/16/24 In caring for this patient including Diagnostic results, Risks and benefits of tx options, Instructions for management, Patient and family education, Importance of tx compliance, Risk factor reductions, Impressions, Counseling / Coordination of care, Documenting in the medical record, Reviewing / ordering tests, medicine, procedures  , and Obtaining or reviewing history  .

## 2024-05-16 ENCOUNTER — TELEPHONE (OUTPATIENT)
Age: 49
End: 2024-05-16

## 2024-05-16 PROBLEM — E55.9 VITAMIN D DEFICIENCY: Status: ACTIVE | Noted: 2024-05-16

## 2024-05-16 LAB — SL AMB POCT HEMOGLOBIN AIC: 8 (ref ?–6.5)

## 2024-05-16 NOTE — ASSESSMENT & PLAN NOTE
Well-controlled on losartan and metoprolol. Continue same dose. Recommend weight loss. Recommend DASH and low salt diet. Discussed importance of diet/exercise.

## 2024-05-16 NOTE — ASSESSMENT & PLAN NOTE
Lab Results   Component Value Date    CHOLESTEROL 161 02/14/2024    CHOLESTEROL 115 07/15/2023    CHOLESTEROL 268 (H) 05/08/2023     Lab Results   Component Value Date    HDL 49 (L) 02/14/2024    HDL 34 (L) 07/15/2023    HDL 36 (L) 05/08/2023     Lab Results   Component Value Date    TRIG 219 (H) 02/14/2024    TRIG 377 (H) 07/15/2023    TRIG 1,091 (H) 05/08/2023     Lab Results   Component Value Date    NONHDLC 112 02/14/2024    NONHDLC 81 07/15/2023    NONHDLC 198 02/03/2023     Improved on rosuvastatin 20mg, takes every other day because does not like taking too many medications. Recommend restart daily dosing for better control.

## 2024-05-16 NOTE — ASSESSMENT & PLAN NOTE
2/3/23  7:25 AM    Vit D, 25-Hydroxy  30.0 - 100.0 ng/mL 17.6 Low      Continue high dose vitamin D2 weekly for now. Repeat lab.

## 2024-05-16 NOTE — ASSESSMENT & PLAN NOTE
Lab Results   Component Value Date    HGBA1C 8.0 (A) 05/16/2024     Had been improving with Trulicity but no refills for 2 months due to insurance issue. Continue metformin max dose and Jardiance 25mg. Reordered Trulicity and sent PA request. Continue low carb diet.   Lab Results   Component Value Date    HGBA1C 7.8 (A) 12/06/2023

## 2024-05-16 NOTE — ASSESSMENT & PLAN NOTE
No chest pain. Reviewed last cardiology consult note in 2/2024. Hx of MI in 5/2023 and stent placed in 95% proximal LAD lesion. Continue DAPT.

## 2024-05-21 ENCOUNTER — TELEPHONE (OUTPATIENT)
Dept: FAMILY MEDICINE CLINIC | Facility: CLINIC | Age: 49
End: 2024-05-21

## 2024-05-21 DIAGNOSIS — N95.1 MENOPAUSAL SYNDROME ON HORMONE REPLACEMENT THERAPY: ICD-10-CM

## 2024-05-21 DIAGNOSIS — Z79.890 MENOPAUSAL SYNDROME ON HORMONE REPLACEMENT THERAPY: ICD-10-CM

## 2024-05-21 DIAGNOSIS — E11.9 TYPE 2 DIABETES MELLITUS WITHOUT COMPLICATION, WITHOUT LONG-TERM CURRENT USE OF INSULIN (HCC): Primary | ICD-10-CM

## 2024-05-21 RX ORDER — DULOXETIN HYDROCHLORIDE 30 MG/1
30 CAPSULE, DELAYED RELEASE ORAL DAILY
Qty: 90 CAPSULE | Refills: 1 | Status: SHIPPED | OUTPATIENT
Start: 2024-05-21

## 2024-05-21 NOTE — TELEPHONE ENCOUNTER
Patient was to check lab for B12 for neuropathy and I sent Mounjaro 2.5mg once weekly instead of Trulicity - pending insurance coverage

## 2024-05-21 NOTE — TELEPHONE ENCOUNTER
Patient came to the office stating that she was supposed to get medication for the neuropathy and another medication other than trulicity that was stronger because trulicity is not covered. Please advise

## 2024-05-22 ENCOUNTER — TELEPHONE (OUTPATIENT)
Dept: FAMILY MEDICINE CLINIC | Facility: CLINIC | Age: 49
End: 2024-05-22

## 2024-05-28 ENCOUNTER — TELEPHONE (OUTPATIENT)
Dept: FAMILY MEDICINE CLINIC | Facility: CLINIC | Age: 49
End: 2024-05-28

## 2024-05-28 DIAGNOSIS — E11.65 TYPE 2 DIABETES MELLITUS WITH HYPERGLYCEMIA, WITHOUT LONG-TERM CURRENT USE OF INSULIN (HCC): Primary | ICD-10-CM

## 2024-05-28 RX ORDER — GLIPIZIDE 2.5 MG/1
2.5 TABLET, EXTENDED RELEASE ORAL
Qty: 60 TABLET | Refills: 0 | Status: SHIPPED | OUTPATIENT
Start: 2024-05-28

## 2024-05-28 NOTE — TELEPHONE ENCOUNTER
I called patient, she has been 3 months without Trulicity due to failed coverage and sent Mounjaro instead but no update with prior auth request, she is upset and sugars going up to 481 after simple soup, start glipizide 2.5mg prior to food 15-30 min within mealtime and consider starting insulin pending insurance coverage with Mounjaro

## 2024-05-29 NOTE — TELEPHONE ENCOUNTER
PA for Mounjaro 2.5mg     Submitted via    [x]CMM-KEY [id number]  [][employer]-Case ID #   []Faxed to plan   []Other website   []Phone call Case ID #     Office notes sent, clinical questions answered. Awaiting determination    Turnaround time for your insurance to make a decision on your Prior Authorization can take 7-21 business days.

## 2024-05-30 NOTE — TELEPHONE ENCOUNTER
PA for Mounjaro 2.5mg Approved     Date(s) approved 05/29/2024-05/29/2025    Case #    Patient advised by          [] [employer] Message  [x] Phone call   []LMOM  []L/M to call office as no active Communication consent on file  []Unable to leave detailed message as VM not approved on Communication consent       Pharmacy advised by    [x]Fax  []Phone call    Approval letter scanned into Media Yes

## 2024-07-05 DIAGNOSIS — E11.9 TYPE 2 DIABETES MELLITUS WITHOUT COMPLICATION, WITHOUT LONG-TERM CURRENT USE OF INSULIN (HCC): ICD-10-CM

## 2024-07-05 RX ORDER — TIRZEPATIDE 2.5 MG/.5ML
INJECTION, SOLUTION SUBCUTANEOUS
Qty: 2 ML | Refills: 0 | Status: SHIPPED | OUTPATIENT
Start: 2024-07-05

## 2024-07-09 DIAGNOSIS — E11.65 TYPE 2 DIABETES MELLITUS WITH HYPERGLYCEMIA, WITHOUT LONG-TERM CURRENT USE OF INSULIN (HCC): Primary | ICD-10-CM

## 2024-07-09 DIAGNOSIS — E11.65 TYPE 2 DIABETES MELLITUS WITH HYPERGLYCEMIA, WITHOUT LONG-TERM CURRENT USE OF INSULIN (HCC): ICD-10-CM

## 2024-07-09 RX ORDER — GLIPIZIDE 2.5 MG/1
2.5 TABLET, EXTENDED RELEASE ORAL
Qty: 60 TABLET | Refills: 0 | Status: SHIPPED | OUTPATIENT
Start: 2024-07-09

## 2024-08-13 DIAGNOSIS — R42 VERTIGO: ICD-10-CM

## 2024-08-13 RX ORDER — MECLIZINE HCL 12.5 MG 12.5 MG/1
12.5 TABLET ORAL EVERY 8 HOURS
Qty: 30 TABLET | Refills: 0 | Status: SHIPPED | OUTPATIENT
Start: 2024-08-13

## 2024-08-14 ENCOUNTER — OFFICE VISIT (OUTPATIENT)
Dept: FAMILY MEDICINE CLINIC | Facility: CLINIC | Age: 49
End: 2024-08-14
Payer: COMMERCIAL

## 2024-08-14 VITALS
DIASTOLIC BLOOD PRESSURE: 80 MMHG | HEART RATE: 75 BPM | OXYGEN SATURATION: 99 % | TEMPERATURE: 97.3 F | HEIGHT: 69 IN | WEIGHT: 195 LBS | SYSTOLIC BLOOD PRESSURE: 118 MMHG | BODY MASS INDEX: 28.88 KG/M2 | RESPIRATION RATE: 17 BRPM

## 2024-08-14 DIAGNOSIS — M54.50 ACUTE LEFT-SIDED LOW BACK PAIN WITHOUT SCIATICA: ICD-10-CM
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
## 2024-08-14 DIAGNOSIS — S69.92XA INJURY OF LEFT WRIST, INITIAL ENCOUNTER: ICD-10-CM

## 2024-08-14 DIAGNOSIS — S39.92XA INJURY OF LOW BACK, INITIAL ENCOUNTER: ICD-10-CM

## 2024-08-14 PROCEDURE — 99213 OFFICE O/P EST LOW 20 MIN: CPT

## 2024-08-14 RX ORDER — ACETAMINOPHEN 500 MG
1000 TABLET ORAL EVERY 8 HOURS
Qty: 126 TABLET | Refills: 0 | Status: SHIPPED | OUTPATIENT
Start: 2024-08-14 | End: 2024-09-04

## 2024-08-14 NOTE — ASSESSMENT & PLAN NOTE
Will order lumbar spine xr as well as left wrist XR for trauma evaluation. Will also begin therapy with high dose tylenol for pain control. Given pt's use of Brilinta ED precautions were given, not limited to: excessive bleeding, new onset leg weakness or changes in sensation, or worsening of bruising.

## 2024-08-14 NOTE — PROGRESS NOTES
Ambulatory Visit  Name: Jena Ching      : 1975      MRN: [id number]  Encounter Provider: Manjit Mayorga MD  Encounter Date: 2024   Encounter department: [facility]    Assessment & Plan   1. Fall, initial encounter  Assessment & Plan:  Will order lumbar spine xr as well as left wrist XR for trauma evaluation. Will also begin therapy with high dose tylenol for pain control. Given pt's use of Brilinta ED precautions were given, not limited to: excessive bleeding, new onset leg weakness or changes in sensation, or worsening of bruising.  Orders:  -     acetaminophen (TYLENOL) 500 mg tablet; Take 2 tablets (1,000 mg total) by mouth every 8 (eight) hours for 21 days  -     XR spine lumbar 2 or 3 views injury; Future; Expected date: 2024  -     XR wrist 3+ vw left; Future; Expected date: 2024       History of Present Illness     Pleasant 49-year-old female with history of CAD with proximal LAD stent, history of NSTEMI, hypertension, T2DM and GERD presenting to clinic for evaluation of low back pain and side pain after fall.  Patient states fall occurred on 2024 inside of her home.  She was walking on the stairs when she slipped and hit the entire left side of her body.  She is denying any head strike or loss of consciousness.  No preceding symptoms prior to fall and is accounting fall to slippery floors.  Since fall she has been having increased lower back pain that is worsened with movement and improved with rest.  This has been interfering with her ability to perform at work as she is moving constantly for 8 hours a day.  Patient is denying any new onset lower extremity weakness or numbness.  Denying red flag symptoms such as saddle anesthesia and/or fecal/urinary incontinence.  Patient has tried to control pain with lidocaine cream and Tylenol 250 mg this gave minimal relief.      Review of Systems   Constitutional:  Negative for  "chills and fever.   Respiratory:  Negative for cough and shortness of breath.    Cardiovascular:  Negative for chest pain.   Gastrointestinal:  Negative for abdominal pain, blood in stool, constipation, diarrhea, nausea and vomiting.   Genitourinary:  Negative for dysuria and hematuria.   Neurological:  Negative for dizziness, syncope, weakness, numbness and headaches.   Hematological:  Bruises/bleeds easily (on Brilinta).       Objective     /80 (BP Location: Left arm, Patient Position: Sitting, Cuff Size: Standard)   Pulse 75   Temp (!) 97.3 °F (36.3 °C) (Temporal)   Resp 17   Ht 5' 9\" (1.753 m)   Wt 88.5 kg (195 lb)   SpO2 99%   BMI 28.80 kg/m²     Physical Exam  Vitals and nursing note reviewed.   Constitutional:       General: She is not in acute distress.     Appearance: Normal appearance. She is not ill-appearing or toxic-appearing.   HENT:      Head: Normocephalic.   Eyes:      Conjunctiva/sclera: Conjunctivae normal.   Cardiovascular:      Rate and Rhythm: Normal rate and regular rhythm.      Pulses: Normal pulses.      Heart sounds: Normal heart sounds.   Pulmonary:      Effort: Pulmonary effort is normal.      Breath sounds: Normal breath sounds.   Musculoskeletal:      Right shoulder: Normal.      Left shoulder: Normal.      Right upper arm: Normal.      Left upper arm: Normal.      Right elbow: Normal.      Left elbow: Normal.      Right forearm: Normal.      Left forearm: Normal.      Right wrist: Normal. No swelling, deformity, effusion, lacerations, tenderness, bony tenderness, snuff box tenderness or crepitus. Normal range of motion. Normal pulse.      Left wrist: Tenderness (to palpation of lateral ulnar surface.) present. No swelling, deformity, effusion, lacerations, bony tenderness, snuff box tenderness or crepitus. Decreased range of motion. Normal pulse.      Right hand: Normal. No swelling, deformity, tenderness or bony tenderness. Normal range of motion. Normal strength. Normal " sensation.      Left hand: Normal. No swelling, deformity, tenderness or bony tenderness. Normal range of motion. Normal strength. Normal sensation.        Hands:       Cervical back: Normal, normal range of motion and neck supple. No swelling, edema, deformity, erythema, signs of trauma, lacerations, rigidity, spasms, torticollis, tenderness, bony tenderness or crepitus. No pain with movement. Normal range of motion.      Thoracic back: Signs of trauma (over bruised area) and tenderness present. No swelling, edema, deformity, lacerations, spasms or bony tenderness. Normal range of motion. No scoliosis.      Lumbar back: Signs of trauma and tenderness (over bruised area) present. No swelling, edema, deformity, lacerations, spasms or bony tenderness. Decreased range of motion (lateral flexion and rotation due to pain/discomfort.). Negative right straight leg raise test and negative left straight leg raise test. No scoliosis.        Back:    Skin:     General: Skin is warm and dry.      Capillary Refill: Capillary refill takes less than 2 seconds.   Neurological:      General: No focal deficit present.      Mental Status: She is alert.   Psychiatric:         Mood and Affect: Mood normal.         Behavior: Behavior normal.     Administrative Statements

## 2024-08-15 ENCOUNTER — HOSPITAL ENCOUNTER (OUTPATIENT)
Dept: RADIOLOGY | Facility: HOSPITAL | Age: 49
End: 2024-08-15
Payer: COMMERCIAL
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
## 2024-08-15 PROCEDURE — 72100 X-RAY EXAM L-S SPINE 2/3 VWS: CPT

## 2024-08-15 PROCEDURE — 73110 X-RAY EXAM OF WRIST: CPT

## 2024-09-18 ENCOUNTER — OFFICE VISIT (OUTPATIENT)
Dept: FAMILY MEDICINE CLINIC | Facility: CLINIC | Age: 49
End: 2024-09-18
Payer: COMMERCIAL

## 2024-09-18 VITALS
WEIGHT: 197.6 LBS | HEIGHT: 69 IN | SYSTOLIC BLOOD PRESSURE: 114 MMHG | DIASTOLIC BLOOD PRESSURE: 82 MMHG | BODY MASS INDEX: 29.27 KG/M2 | HEART RATE: 74 BPM | TEMPERATURE: 97.9 F | RESPIRATION RATE: 17 BRPM | OXYGEN SATURATION: 98 %

## 2024-09-18 DIAGNOSIS — I10 PRIMARY HYPERTENSION: ICD-10-CM

## 2024-09-18 DIAGNOSIS — E11.65 TYPE 2 DIABETES MELLITUS WITH HYPERGLYCEMIA, WITHOUT LONG-TERM CURRENT USE OF INSULIN (HCC): ICD-10-CM

## 2024-09-18 DIAGNOSIS — Z12.31 SCREENING MAMMOGRAM FOR BREAST CANCER: ICD-10-CM

## 2024-09-18 DIAGNOSIS — I25.10 CORONARY ARTERY DISEASE INVOLVING NATIVE CORONARY ARTERY OF NATIVE HEART WITHOUT ANGINA PECTORIS: ICD-10-CM

## 2024-09-18 DIAGNOSIS — E78.1 HYPERTRIGLYCERIDEMIA: ICD-10-CM

## 2024-09-18 DIAGNOSIS — Z98.61 S/P PTCA (PERCUTANEOUS TRANSLUMINAL CORONARY ANGIOPLASTY): ICD-10-CM
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
## 2024-09-18 DIAGNOSIS — Z23 ENCOUNTER FOR IMMUNIZATION: ICD-10-CM

## 2024-09-18 PROBLEM — R51.9 HEADACHE: Status: RESOLVED | Noted: 2023-05-08 | Resolved: 2024-09-18

## 2024-09-18 PROBLEM — M79.671 RIGHT FOOT PAIN: Status: RESOLVED | Noted: 2023-02-01 | Resolved: 2024-09-18

## 2024-09-18 PROBLEM — M54.2 NECK PAIN: Status: RESOLVED | Noted: 2023-02-28 | Resolved: 2024-09-18
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
## 2024-09-18 LAB — SL AMB POCT HEMOGLOBIN AIC: 7.9 (ref ?–6.5)

## 2024-09-18 PROCEDURE — 83036 HEMOGLOBIN GLYCOSYLATED A1C: CPT

## 2024-09-18 PROCEDURE — 90472 IMMUNIZATION ADMIN EACH ADD: CPT

## 2024-09-18 PROCEDURE — 90713 POLIOVIRUS IPV SC/IM: CPT

## 2024-09-18 PROCEDURE — 90673 RIV3 VACCINE NO PRESERV IM: CPT

## 2024-09-18 PROCEDURE — 90471 IMMUNIZATION ADMIN: CPT

## 2024-09-18 PROCEDURE — 99214 OFFICE O/P EST MOD 30 MIN: CPT

## 2024-09-18 RX ORDER — ROSUVASTATIN CALCIUM 20 MG/1
20 TABLET, COATED ORAL DAILY
Qty: 90 TABLET | Refills: 3 | Status: SHIPPED | OUTPATIENT
Start: 2024-09-18 | End: 2025-09-13

## 2024-09-18 RX ORDER — METOPROLOL SUCCINATE 25 MG/1
25 TABLET, EXTENDED RELEASE ORAL
Qty: 90 TABLET | Refills: 3 | Status: SHIPPED | OUTPATIENT
Start: 2024-09-18 | End: 2025-09-13

## 2024-09-18 RX ORDER — LOSARTAN POTASSIUM 50 MG/1
50 TABLET ORAL DAILY
Qty: 90 TABLET | Refills: 3 | Status: SHIPPED | OUTPATIENT
Start: 2024-09-18 | End: 2025-09-13

## 2024-09-18 NOTE — ASSESSMENT & PLAN NOTE
Lab Results   Component Value Date    HGBA1C 7.9 (A) 09/18/2024     A1c well-controlled.  Continue with metoprolol 1000 mg twice daily (patient using 1000 daily), empagliflozin 25 mg daily, glipizide 2.5 mg twice daily, and tirzepatide 5 mg weekly.  Patient up-to-date with diabetic screenings.    Orders:    POCT hemoglobin A1c    Comprehensive metabolic panel; Future    losartan (COZAAR) 50 mg tablet; Take 1 tablet (50 mg total) by mouth daily

## 2024-09-18 NOTE — PROGRESS NOTES
Ambulatory Visit  Name: Jena Ching      : 1975      MRN: [id number]  Encounter Provider: Manjit Mayorga MD  Encounter Date: 2024   Encounter department: [facility]    Assessment & Plan  Fall, subsequent encounter  Pain and discomfort have resolved.  Reviewed low back and left wrist x-rays.         Type 2 diabetes mellitus with hyperglycemia, without long-term current use of insulin ([facility])    Lab Results   Component Value Date    HGBA1C 7.9 (A) 2024     A1c well-controlled.  Continue with metoprolol 1000 mg twice daily (patient using 1000 daily), empagliflozin 25 mg daily, glipizide 2.5 mg twice daily, and tirzepatide 5 mg weekly.  Patient up-to-date with diabetic screenings.    Orders:    POCT hemoglobin A1c    Comprehensive metabolic panel; Future    losartan (COZAAR) 50 mg tablet; Take 1 tablet (50 mg total) by mouth daily    Primary hypertension  BP Readings from Last 3 Encounters:   24 114/82   24 118/80   05/15/24 114/72      BP well-controlled.  Continue losartan 50 mg daily and metoprolol succinate 25 mg at bedtime.    Orders:    losartan (COZAAR) 50 mg tablet; Take 1 tablet (50 mg total) by mouth daily    Coronary artery disease involving native coronary artery of native heart without angina pectoris  Stable.  Continue with metoprolol succinate 25 mg at bedtime, Brilinta 60 mg twice daily (changed by cardiology from 90 mg twice daily on 2024), ASA 81 mg daily, rosuvastatin 20 mg daily (LDL at goal).    Orders:    metoprolol succinate (TOPROL-XL) 25 mg 24 hr tablet; Take 1 tablet (25 mg total) by mouth daily at bedtime    S/P PTCA (percutaneous transluminal coronary angioplasty)    Orders:    ticagrelor (Brilinta) 60 MG; Take 1 tablet (60 mg total) by mouth every 12 (twelve) hours    Hypertriglyceridemia  Continue with rosuvastatin 20 mg daily.  Repeat lipid panel 2025    Orders:    rosuvastatin (CRESTOR)  "20 MG tablet; Take 1 tablet (20 mg total) by mouth daily    Encounter for immunization    Orders:    POLIOVIRUS VACCINE IPV SQ/IM    influenza vaccine, recombinant, PF, 0.5 mL IM (Flublok)    Screening mammogram for breast cancer  Reminded patient to complete mammogram.  Orders:    Mammo screening bilateral w 3d and cad; Future       History of Present Illness     Pleasant 49-year-old female with medical history significant for CAD with proximal LAD stent, HTN, GERD, T2DM presenting to clinic for follow-up after fall.  Overall patient states her symptoms have greatly improved.  She is now able to perform her duties at work without much issue.  She is endorsing good compliance to medication for chronic conditions.  Endorsing no concerns today.          Review of Systems   Constitutional:  Negative for chills and fever.   Respiratory:  Negative for cough and shortness of breath.    Cardiovascular:  Negative for chest pain.   Gastrointestinal:  Negative for abdominal pain, blood in stool, constipation, diarrhea, nausea and vomiting.   Genitourinary:  Negative for dysuria and hematuria.   Hematological:  Bruises/bleeds easily (Using Brilinta 90 mg twice daily.  Decreased on last cardiology visit.  EMR updated today).           Objective     /82 (BP Location: Left arm, Patient Position: Sitting, Cuff Size: Standard)   Pulse 74   Temp 97.9 °F (36.6 °C) (Temporal)   Resp 17   Ht 5' 9\" (1.753 m)   Wt 89.6 kg (197 lb 9.6 oz)   SpO2 98%   BMI 29.18 kg/m²     Physical Exam  Vitals and nursing note reviewed.   Constitutional:       Appearance: Normal appearance.   HENT:      Head: Normocephalic.   Eyes:      Conjunctiva/sclera: Conjunctivae normal.   Cardiovascular:      Rate and Rhythm: Normal rate and regular rhythm.      Pulses: Normal pulses.      Heart sounds: Normal heart sounds.   Pulmonary:      Effort: Pulmonary effort is normal.      Breath sounds: Normal breath sounds.   Musculoskeletal:         General: " Normal range of motion.      Cervical back: Neck supple.   Skin:     General: Skin is warm and dry.   Neurological:      General: No focal deficit present.      Mental Status: She is alert.   Psychiatric:         Mood and Affect: Mood normal.         Behavior: Behavior normal.

## 2024-09-18 NOTE — ASSESSMENT & PLAN NOTE
Stable.  Continue with metoprolol succinate 25 mg at bedtime, Brilinta 60 mg twice daily (changed by cardiology from 90 mg twice daily on 2/14/2024), ASA 81 mg daily, rosuvastatin 20 mg daily (LDL at goal).    Orders:    metoprolol succinate (TOPROL-XL) 25 mg 24 hr tablet; Take 1 tablet (25 mg total) by mouth daily at bedtime

## 2024-09-18 NOTE — ASSESSMENT & PLAN NOTE
Continue with rosuvastatin 20 mg daily.  Repeat lipid panel February 2025    Orders:    rosuvastatin (CRESTOR) 20 MG tablet; Take 1 tablet (20 mg total) by mouth daily

## 2024-09-18 NOTE — ASSESSMENT & PLAN NOTE
BP Readings from Last 3 Encounters:   09/18/24 114/82   08/14/24 118/80   05/15/24 114/72      BP well-controlled.  Continue losartan 50 mg daily and metoprolol succinate 25 mg at bedtime.    Orders:    losartan (COZAAR) 50 mg tablet; Take 1 tablet (50 mg total) by mouth daily

## 2024-10-14 ENCOUNTER — DOCUMENTATION (OUTPATIENT)
Dept: FAMILY MEDICINE CLINIC | Facility: CLINIC | Age: 49
End: 2024-10-14

## 2024-10-14 DIAGNOSIS — E11.65 TYPE 2 DIABETES MELLITUS WITH HYPERGLYCEMIA, WITHOUT LONG-TERM CURRENT USE OF INSULIN (HCC): Primary | ICD-10-CM

## 2024-10-14 DIAGNOSIS — E11.65 TYPE 2 DIABETES MELLITUS WITH HYPERGLYCEMIA, WITHOUT LONG-TERM CURRENT USE OF INSULIN (HCC): ICD-10-CM

## 2024-10-15 RX ORDER — GLIPIZIDE 2.5 MG/1
2.5 TABLET, EXTENDED RELEASE ORAL
Qty: 180 TABLET | Refills: 1 | Status: SHIPPED | OUTPATIENT
Start: 2024-10-15

## 2024-10-15 NOTE — ASSESSMENT & PLAN NOTE
Lab Results   Component Value Date    HGBA1C 7.9 (A) 09/18/2024   Deferred to PCP for management

## 2024-10-15 NOTE — ASSESSMENT & PLAN NOTE
5/15/2024 114/72  8/14/2024 118/80  9/18/2024 114/82    Losartan 50 mg daily  Metoprolol succinate 25 mg daily

## 2024-10-15 NOTE — ASSESSMENT & PLAN NOTE
5/8/2023-5/10/2023 [facility] patient hospitalized with NSTEMI, peak troponin 213.     5/9/2023 cardiac catheterization: Proximal LAD before the first diagonal and septal  95% treated with BRUNO 35 mm x 26 mm to 14 sandip with residual 0% lesion.  Distal circumflex 80% lesion in a small territory.  RCA dominant with proximal 35%.     5/9/2023 echocardiogram precardiac catheterization: LVEF 54% with grade 1 diastolic dysfunction.  GLS reduced at -12.7% anterior apical, anterior septal and apical, inferior apical, inferior lateral and apical hypokinesis.    Aspirin 81 mg daily   Brilinta 60 mg every 12 hours  Rosuvastatin 20 mg daily  Metoprolol succinate 25 mg daily

## 2024-10-15 NOTE — ASSESSMENT & PLAN NOTE
Lab Results   Component Value Date    CHOLESTEROL 161 02/14/2024     Lab Results   Component Value Date    TRIG 219 (H) 02/14/2024     Lab Results   Component Value Date    HDL 49 (L) 02/14/2024     Lab Results   Component Value Date    LDLCALC 68 02/14/2024     Lab Results   Component Value Date    LDLDIRECT 111 (H) 05/08/2023     Rosuvastatin 20 mg daily

## 2024-10-17 ENCOUNTER — OFFICE VISIT (OUTPATIENT)
Dept: CARDIOLOGY CLINIC | Facility: CLINIC | Age: 49
End: 2024-10-17
Payer: COMMERCIAL

## 2024-10-17 VITALS
BODY MASS INDEX: 29.27 KG/M2 | WEIGHT: 198.2 LBS | HEART RATE: 68 BPM | DIASTOLIC BLOOD PRESSURE: 70 MMHG | SYSTOLIC BLOOD PRESSURE: 110 MMHG

## 2024-10-17 DIAGNOSIS — E78.1 HYPERTRIGLYCERIDEMIA: ICD-10-CM

## 2024-10-17 DIAGNOSIS — I25.10 CORONARY ARTERY DISEASE INVOLVING NATIVE CORONARY ARTERY OF NATIVE HEART WITHOUT ANGINA PECTORIS: Primary | ICD-10-CM

## 2024-10-17 DIAGNOSIS — I10 PRIMARY HYPERTENSION: ICD-10-CM

## 2024-10-17 DIAGNOSIS — E11.65 TYPE 2 DIABETES MELLITUS WITH HYPERGLYCEMIA, WITHOUT LONG-TERM CURRENT USE OF INSULIN (HCC): ICD-10-CM

## 2024-10-17 PROCEDURE — 99214 OFFICE O/P EST MOD 30 MIN: CPT | Performed by: INTERNAL MEDICINE

## 2024-10-17 PROCEDURE — 93000 ELECTROCARDIOGRAM COMPLETE: CPT | Performed by: INTERNAL MEDICINE

## 2024-10-17 NOTE — PROGRESS NOTES
[facility]  [address] [place] [address]  Phone#  [phone]   Fax#  [phone]  *-*-*-*-*-*-*-*-*-*-*-*-*-*-*-*-*-*-*-*-*-*-*-*-*-*-*-*-*-*-*-*-*-*-*-*-*-*-*-*-*-*-*-*-*-*-*-*-*-*-*-*-*-*                                   Cardiology Follow Up      ENCOUNTER DATE: 10/17/24 5:04 PM  PATIENT NAME: Jena Ching   : 1975    MRN: [id number]  AGE:49 y.o.      SEX: female  ENCOUNTER PROVIDER:Harshil Pruitt MD     PRIMARY CARE PHYSICIAN: Vandana Bill PA-C    CARDIOLOGY SPECIALTY COMMENTS  2023-5/10/2023 [facility] patient hospitalized with NSTEMI, peak troponin 213.  Other diagnoses include primary hypertension, headache, type 2 diabetes mellitus, hypertriglyceridemia    2023 echocardiogram precardiac catheterization: LVEF 54% with grade 1 diastolic dysfunction.  GLS reduced at -12.7% anterior apical, anterior septal and apical, inferior apical, inferior lateral and apical hypokinesis.    2023 cardiac catheterization: Proximal LAD before the first diagonal and septal  95% treated with BRUNO 35 mm x 26 mm to 14 sandip with residual 0% lesion.  Distal circumflex 80% lesion in a small territory.  RCA dominant with proximal 35%.    ACTIVE PROBLEM LIST  Patient Active Problem List   Diagnosis    Hypertriglyceridemia    Menopausal syndrome on hormone replacement therapy    Type 2 diabetes mellitus with hyperglycemia, without long-term current use of insulin (HCC)    Gastroesophageal reflux disease without esophagitis    Neuropathy    Overweight (BMI 25.0-29.9)    Vertigo    Primary hypertension    Coronary artery disease involving native coronary artery of native heart without angina pectoris    History of non-ST elevation myocardial infarction (NSTEMI)    History of proximal LAD stent 2023    Vitamin D deficiency       ACTIVE DIAGNOSIS AND PLAN    1. Coronary artery disease involving native coronary artery of native heart without angina  pectoris  Assessment & Plan:  5/8/2023-5/10/2023 [facility] patient hospitalized with NSTEMI, peak troponin 213.     5/9/2023 cardiac catheterization: Proximal LAD before the first diagonal and septal  95% treated with BRUNO 35 mm x 26 mm to 14 sandip with residual 0% lesion.  Distal circumflex 80% lesion in a small territory.  RCA dominant with proximal 35%.     5/9/2023 echocardiogram precardiac catheterization: LVEF 54% with grade 1 diastolic dysfunction.  GLS reduced at -12.7% anterior apical, anterior septal and apical, inferior apical, inferior lateral and apical hypokinesis.    Aspirin 81 mg daily   Brilinta 60 mg every 12 hours  Rosuvastatin 20 mg daily  Metoprolol succinate 25 mg daily      2. Hypertriglyceridemia  Assessment & Plan:  Lab Results   Component Value Date    CHOLESTEROL 161 02/14/2024     Lab Results   Component Value Date    TRIG 219 (H) 02/14/2024     Lab Results   Component Value Date    HDL 49 (L) 02/14/2024     Lab Results   Component Value Date    LDLCALC 68 02/14/2024     Lab Results   Component Value Date    LDLDIRECT 111 (H) 05/08/2023     Rosuvastatin 20 mg daily  3. Primary hypertension  Assessment & Plan:  5/15/2024 114/72  8/14/2024 118/80  9/18/2024 114/82    Losartan 50 mg daily  Metoprolol succinate 25 mg daily    Orders:  -     POCT ECG  4. Type 2 diabetes mellitus with hyperglycemia, without long-term current use of insulin ([facility])  Assessment & Plan:    Lab Results   Component Value Date    HGBA1C 7.9 (A) 09/18/2024   Deferred to PCP for management     INTERVAL HISTORY:        Patient with a proximal LAD stent May 9, 2023 returns and has no cardiac complaints. She denies chest discomfort or shortness of breath.  She has no palpitations.  She denies symptoms of dizziness, lightheadedness or near-syncope/syncope.  She denies leg edema.  She denies symptoms of orthopnea or paroxysmal nocturnal dyspnea.    Her blood pressure is excellent at 110/70.      Her last cholesterol  in February demonstrated an LDL of 68 with mildly elevated triglycerides.    DISCUSSION/PLAN:          Continue rosuvastatin 20 mg daily  Continue metoprolol succinate 25 mg daily  Continue losartan 50 mg daily  Continue Brilinta 60 mg every 12 hours  Continue aspirin 81 mg daily  Return in May 2025  Stop Brilinta after return visit    Results for orders placed or performed in visit on 10/17/24   POCT ECG    Narrative    Normal sinus rhythm at a rate of 68 bpm.  Normal EKG.         Lab Studies:    Lab Results   Component Value Date    CHOLESTEROL 161 02/14/2024    CHOLESTEROL 115 07/15/2023    CHOLESTEROL 268 (H) 05/08/2023     Lab Results   Component Value Date    TRIG 219 (H) 02/14/2024    TRIG 377 (H) 07/15/2023    TRIG 1,091 (H) 05/08/2023     Lab Results   Component Value Date    HDL 49 (L) 02/14/2024    HDL 34 (L) 07/15/2023    HDL 36 (L) 05/08/2023     Lab Results   Component Value Date    LDLCALC 68 02/14/2024    LDLCALC 6 07/15/2023    LDLCALC  05/08/2023      Comment:      Calculated LDL invalid, triglycerides >400 mg/dl       Lab Results   Component Value Date    LDLDIRECT 111 (H) 05/08/2023         Lab Results   Component Value Date    HGBA1C 7.9 (A) 09/18/2024    HGBA1C 8.0 (A) 05/16/2024    HGBA1C 7.8 (A) 12/06/2023      Lab Results   Component Value Date    EGFR 80 02/14/2024    EGFR 79 07/15/2023    EGFR 108 05/10/2023    SODIUM 141 02/14/2024    SODIUM 138 07/15/2023    SODIUM 137 05/10/2023    K 4.6 02/14/2024    K 4.0 07/15/2023    K 3.7 05/10/2023     02/14/2024     07/15/2023     05/10/2023    CO2 25 02/14/2024    CO2 21 07/15/2023    CO2 21 05/10/2023    BUN 17 02/14/2024    BUN 18 07/15/2023    BUN 9 05/10/2023    CREATININE 0.86 02/14/2024    CREATININE 0.87 07/15/2023    CREATININE 0.61 05/10/2023    MG 1.9 05/10/2023    MG 1.9 05/09/2023     Lab Results   Component Value Date    WBC 6.81 07/15/2023    WBC 5.34 05/10/2023    WBC 5.70 05/09/2023    HGB 12.5 07/15/2023    HGB  11.7 05/10/2023    HGB 11.2 (L) 05/09/2023    HCT 39.9 07/15/2023    HCT 35.8 05/10/2023    HCT 33.7 (L) 05/09/2023    MCV 89 07/15/2023    MCV 87 05/10/2023    MCV 88 05/09/2023    MCH 27.8 07/15/2023    MCH 28.4 05/10/2023    MCH 29.2 05/09/2023    MCHC 31.3 (L) 07/15/2023    MCHC 32.7 05/10/2023    MCHC 33.2 05/09/2023     07/15/2023     05/10/2023     05/09/2023      Lab Results   Component Value Date    CALCIUM 9.8 02/14/2024    CALCIUM 9.3 07/15/2023    CALCIUM 8.6 05/10/2023    ALB 4.8 02/14/2024    ALB 4.6 07/15/2023    ALB 4.4 02/03/2023    TP 7.5 02/14/2024    TP 7.0 07/15/2023    TP 7.7 02/03/2023    AST 11 (L) 02/14/2024    AST 15 07/15/2023    AST 20 02/03/2023    ALT 11 02/14/2024    ALT 15 07/15/2023    ALT 17 02/03/2023    ALKPHOS 42 02/14/2024    ALKPHOS 50 07/15/2023    ALKPHOS 58 02/03/2023       Lab Results   Component Value Date    CRP <5.0 02/03/2023       Current Outpatient Medications:     Alcohol Swabs (Pharmacist Choice Alcohol) PADS, Use in the morning, Disp: 100 each, Rfl: 3    aspirin 81 mg chewable tablet, Chew 1 tablet (81 mg total) daily, Disp: 90 tablet, Rfl: 3    Blood Glucose Monitoring Suppl (OneTouch Verio Reflect) w/Device KIT, Check blood sugars once daily. Please substitute with appropriate alternative as covered by patient's insurance. Dx: E11.65, Disp: 1 kit, Rfl: 0    Empagliflozin 25 MG TABS, Take 1 tablet (25 mg total) by mouth every morning, Disp: 90 tablet, Rfl: 1    ergocalciferol (VITAMIN D2) 50,000 units, Take 1 capsule (50,000 Units total) by mouth once a week, Disp: 12 capsule, Rfl: 1    famotidine (PEPCID) 20 mg tablet, Take 1 tablet (20 mg total) by mouth 2 (two) times a day as needed for indigestion or heartburn, Disp: 60 tablet, Rfl: 5    glipiZIDE (GLUCOTROL XL) 2.5 mg 24 hr tablet, Take 1 tablet (2.5 mg total) by mouth 2 (two) times a day before meals, Disp: 180 tablet, Rfl: 1    glucose blood test strip, Use as instructed, Disp: 100  strip, Rfl: 3    losartan (COZAAR) 50 mg tablet, Take 1 tablet (50 mg total) by mouth daily, Disp: 90 tablet, Rfl: 3    meclizine (ANTIVERT) 12.5 MG tablet, Take 1 tablet (12.5 mg total) by mouth every 8 (eight) hours, Disp: 30 tablet, Rfl: 0    metFORMIN (GLUCOPHAGE) 1000 MG tablet, Take 1 tablet (1,000 mg total) by mouth 2 (two) times a day with meals, Disp: 180 tablet, Rfl: 3    methocarbamol (ROBAXIN) 500 mg tablet, Take 1 tablet (500 mg total) by mouth 2 (two) times a day as needed for muscle spasms [place] 1 tableta dos veces al jose si necesita para dolor muscular, Disp: 60 tablet, Rfl: 0    metoprolol succinate (TOPROL-XL) 25 mg 24 hr tablet, Take 1 tablet (25 mg total) by mouth daily at bedtime, Disp: 90 tablet, Rfl: 3    OneTouch Delica Lancets 33G MISC, Check blood sugars once daily. Please substitute with appropriate alternative as covered by patient's insurance. Dx: E11.65, Disp: 100 each, Rfl: 3    rosuvastatin (CRESTOR) 20 MG tablet, Take 1 tablet (20 mg total) by mouth daily, Disp: 90 tablet, Rfl: 3    ticagrelor (Brilinta) 60 MG, Take 1 tablet (60 mg total) by mouth every 12 (twelve) hours, Disp: 60 tablet, Rfl: 11    tirzepatide (Mounjaro) 5 MG/0.5ML, Inject 0.5 mL (5 mg total) under the skin every 7 days, Disp: 6 mL, Rfl: 1  No Known Allergies    Past Medical History:   Diagnosis Date    Coronary artery disease 05/2023    Diabetes mellitus (HCC)     Fall 08/14/2024    Bruising over left lateral back, left wrist and left gluteus.      GERD (gastroesophageal reflux disease)     Headache 05/08/2023    Hypertension     Neck pain 02/28/2023    NSTEMI (non-ST elevated myocardial infarction) (HCC) 05/2023    Right foot pain 02/01/2023    Vertigo      Social History     Socioeconomic History    Marital status: /Civil Union     Spouse name: Not on file    Number of children: Not on file    Years of education: Not on file    Highest education level: Not on file   Occupational History    Not on file    Tobacco Use    Smoking status: Never    Smokeless tobacco: Never   Vaping Use    Vaping status: Never Used   Substance and Sexual Activity    Alcohol use: Never    Drug use: Never    Sexual activity: Not Currently     Partners: Male     Birth control/protection: Female Sterilization   Other Topics Concern    Not on file   Social History Narrative    Not on file     Social Determinants of Health     Financial Resource Strain: Not on file   Food Insecurity: Not on file   Transportation Needs: Not on file   Physical Activity: Not on file   Stress: Not on file   Social Connections: Not on file   Intimate Partner Violence: Not on file   Housing Stability: Not on file      Family History   Problem Relation Age of Onset    Diabetes Mother     Heart attack Father      Past Surgical History:   Procedure Laterality Date    CARDIAC CATHETERIZATION N/A 2023    Procedure: Cardiac catheterization;  Surgeon: Ian Carlson DO;  Location: [facility];  Service: Cardiology    CARDIAC CATHETERIZATION N/A 2023    Procedure: Cardiac Coronary Angiogram;  Surgeon: Ian Carlson DO;  Location: [facility];  Service: Cardiology    CARDIAC CATHETERIZATION N/A 2023    Procedure: Cardiac pci;  Surgeon: Ian Carlson DO;  Location: [facility];  Service: Cardiology     SECTION       and     HYSTERECTOMY W/ SALPINGO-OOPHERECTOMY Bilateral     in [facility]        PREVIOUS WEIGHTS:   Wt Readings from Last 10 Encounters:   10/17/24 89.9 kg (198 lb 3.2 oz)   24 89.6 kg (197 lb 9.6 oz)   24 88.5 kg (195 lb)   05/15/24 90.5 kg (199 lb 9.6 oz)   24 88.9 kg (196 lb)   23 86.3 kg (190 lb 3.2 oz)   23 86.3 kg (190 lb 3.2 oz)   23 90 kg (198 lb 6.4 oz)   23 88.5 kg (195 lb)   23 89 kg (196 lb 1.6 oz)        Review of Systems:  Review of Systems   Respiratory:  Negative for cough, choking, chest tightness, shortness of breath and  wheezing.    Cardiovascular:  Negative for chest pain, palpitations and leg swelling.   Musculoskeletal:  Negative for gait problem.   Skin:  Negative for rash.   Neurological:  Negative for dizziness, tremors, syncope, weakness, light-headedness, numbness and headaches.   Psychiatric/Behavioral:  Negative for agitation and behavioral problems. The patient is not hyperactive.        Physical Exam:  /70 (BP Location: Left arm, Patient Position: Sitting, Cuff Size: Adult)   Pulse 68   Wt 89.9 kg (198 lb 3.2 oz)   BMI 29.27 kg/m²     Physical Exam  Constitutional:       General: She is not in acute distress.     Appearance: She is well-developed.   HENT:      Head: Normocephalic and atraumatic.   Neck:      Thyroid: No thyromegaly.      Vascular: No carotid bruit or JVD.      Trachea: No tracheal deviation.   Cardiovascular:      Rate and Rhythm: Normal rate and regular rhythm.      Pulses: Normal pulses.      Heart sounds: Normal heart sounds. No murmur heard.     No friction rub. No gallop.   Pulmonary:      Effort: Pulmonary effort is normal. No respiratory distress.      Breath sounds: Normal breath sounds. No wheezing, rhonchi or rales.   Chest:      Chest wall: No tenderness.   Musculoskeletal:         General: Normal range of motion.      Cervical back: Normal range of motion and neck supple.      Right lower leg: No edema.      Left lower leg: No edema.   Skin:     General: Skin is warm and dry.   Neurological:      General: No focal deficit present.      Mental Status: She is alert and oriented to person, place, and time.      Gait: Gait normal.   Psychiatric:         Mood and Affect: Mood normal.         Behavior: Behavior normal.         Thought Content: Thought content normal.         Judgment: Judgment normal.       ======================================================  Imaging:   Results Review Statement: No pertinent imaging studies reviewed.    Portions of the record may have been created with  "voice recognition software. Occasional wrong word or \"sound a like\" substitutions may have occurred due to the inherent limitations of voice recognition software. Read the chart carefully and recognize, using context, where substitutions have occurred.    SIGNATURES:   Harshil Pruitt MD   "

## 2024-10-18 DIAGNOSIS — E55.9 VITAMIN D DEFICIENCY: Primary | ICD-10-CM

## 2024-10-18 DIAGNOSIS — I10 PRIMARY HYPERTENSION: ICD-10-CM

## 2024-11-21 DIAGNOSIS — E55.9 VITAMIN D DEFICIENCY: Primary | ICD-10-CM

## 2024-11-21 DIAGNOSIS — K21.9 GASTROESOPHAGEAL REFLUX DISEASE WITHOUT ESOPHAGITIS: ICD-10-CM

## 2024-11-21 DIAGNOSIS — I21.4 NSTEMI (NON-ST ELEVATED MYOCARDIAL INFARCTION) (HCC): ICD-10-CM

## 2024-11-21 DIAGNOSIS — E55.9 VITAMIN D DEFICIENCY: ICD-10-CM

## 2024-11-21 RX ORDER — ERGOCALCIFEROL 1.25 MG/1
50000 CAPSULE, LIQUID FILLED ORAL WEEKLY
Qty: 12 CAPSULE | Refills: 1 | OUTPATIENT
Start: 2024-11-21

## 2024-11-21 RX ORDER — ASPIRIN 81 MG/1
81 TABLET, CHEWABLE ORAL DAILY
Qty: 90 TABLET | Refills: 3 | Status: SHIPPED | OUTPATIENT
Start: 2024-11-21 | End: 2025-02-19

## 2024-11-21 RX ORDER — FAMOTIDINE 20 MG/1
20 TABLET, FILM COATED ORAL 2 TIMES DAILY PRN
Qty: 60 TABLET | Refills: 5 | Status: SHIPPED | OUTPATIENT
Start: 2024-11-21

## 2024-11-21 NOTE — TELEPHONE ENCOUNTER
Patient needs to complete labs to determine if she needs to continue high dose vitamin D - please have her complete and she is overdue for mammogram screening - please have her schedule asap

## 2024-11-22 NOTE — TELEPHONE ENCOUNTER
Pt was called unable to contact to [facility] regarding labs and medication.

## 2024-11-25 DIAGNOSIS — E11.9 TYPE 2 DIABETES MELLITUS WITHOUT COMPLICATION, WITHOUT LONG-TERM CURRENT USE OF INSULIN (HCC): ICD-10-CM

## 2024-11-25 RX ORDER — ISOPROPYL ALCOHOL 0.7 ML/ML
SWAB TOPICAL DAILY
Qty: 100 EACH | Refills: 3 | Status: SHIPPED | OUTPATIENT
Start: 2024-11-25

## 2024-11-25 RX ORDER — LANCETS 33 GAUGE
EACH MISCELLANEOUS
Qty: 100 EACH | Refills: 3 | Status: SHIPPED | OUTPATIENT
Start: 2024-11-25

## 2025-02-19 ENCOUNTER — OFFICE VISIT (OUTPATIENT)
Dept: FAMILY MEDICINE CLINIC | Facility: CLINIC | Age: 50
End: 2025-02-19
Payer: COMMERCIAL

## 2025-02-19 VITALS
WEIGHT: 202.6 LBS | SYSTOLIC BLOOD PRESSURE: 127 MMHG | TEMPERATURE: 98.4 F | HEART RATE: 96 BPM | HEIGHT: 69 IN | RESPIRATION RATE: 17 BRPM | OXYGEN SATURATION: 97 % | BODY MASS INDEX: 30.01 KG/M2 | DIASTOLIC BLOOD PRESSURE: 73 MMHG

## 2025-02-19 DIAGNOSIS — M72.2 PLANTAR FASCIITIS, BILATERAL: Primary | ICD-10-CM

## 2025-02-19 DIAGNOSIS — E11.65 TYPE 2 DIABETES MELLITUS WITH HYPERGLYCEMIA, WITHOUT LONG-TERM CURRENT USE OF INSULIN (HCC): ICD-10-CM

## 2025-02-19 DIAGNOSIS — G62.9 NEUROPATHY: ICD-10-CM

## 2025-02-19 DIAGNOSIS — K21.9 GASTROESOPHAGEAL REFLUX DISEASE WITHOUT ESOPHAGITIS: ICD-10-CM

## 2025-02-19 DIAGNOSIS — Z12.31 ENCOUNTER FOR SCREENING MAMMOGRAM FOR BREAST CANCER: ICD-10-CM

## 2025-02-19 DIAGNOSIS — M70.62 TROCHANTERIC BURSITIS OF BOTH HIPS: ICD-10-CM

## 2025-02-19 DIAGNOSIS — M70.61 TROCHANTERIC BURSITIS OF BOTH HIPS: ICD-10-CM

## 2025-02-19 DIAGNOSIS — Z76.0 MEDICATION REFILL: ICD-10-CM

## 2025-02-19 PROCEDURE — 99214 OFFICE O/P EST MOD 30 MIN: CPT

## 2025-02-19 RX ORDER — NAPROXEN 500 MG/1
500 TABLET ORAL 2 TIMES DAILY WITH MEALS
Qty: 14 TABLET | Refills: 0 | Status: SHIPPED | OUTPATIENT
Start: 2025-02-19 | End: 2025-02-26

## 2025-02-19 RX ORDER — TIRZEPATIDE 5 MG/.5ML
5 INJECTION, SOLUTION SUBCUTANEOUS WEEKLY
Qty: 2 ML | Refills: 1 | Status: SHIPPED | OUTPATIENT
Start: 2025-02-19

## 2025-02-19 RX ORDER — GABAPENTIN 300 MG/1
300 CAPSULE ORAL 3 TIMES DAILY
Qty: 90 CAPSULE | Refills: 0 | Status: SHIPPED | OUTPATIENT
Start: 2025-02-19 | End: 2025-03-21

## 2025-02-19 RX ORDER — TIRZEPATIDE 5 MG/.5ML
INJECTION, SOLUTION SUBCUTANEOUS
COMMUNITY
Start: 2025-01-23 | End: 2025-02-19 | Stop reason: SDUPTHER

## 2025-02-19 NOTE — ASSESSMENT & PLAN NOTE
Lab Results   Component Value Date    HGBA1C 7.9 (A) 09/18/2024     Due for repeat A1c next month.  Will be sending refill of Mounjaro 5 mg weekly to be adjusted next month if needed with PCP.  Continue with metformin 1000 mg twice daily empagliflozin 25 mg daily, glipizide 2.5 mg twice daily and Mounjaro 5 mg weekly.  Orders:    Mounjaro 5 MG/0.5ML SOAJ; Inject 5 mg as directed once a week

## 2025-02-19 NOTE — PATIENT INSTRUCTIONS
Patient Education     Ejercicios para bursitis de la cadera   Acerca de catalino jeniffer   Rica bolsa es un saco pequeño lleno de líquidos. Actúa kalee amortiguador entre el hueso y el tendón. El tendón es rica thompson marshall que conecta el músculo al hueso. Las bolsas ayudan a los tendones a deslizarse y a heidi articulaciones a moverse con mayor facilidad. En la cadera hay lamine grupos de bolsas sinoviales. Shane está alrededor de la parte ósea externa de la articulación de la cadera. Se le conoce kalee bolsa del trocánter mayor. Otro alexandru de bolsas se encuentra en la parte frontal de la cadera, cerca de la jerod de la irma. Éstas se denominan bolsas del iliopsoas (psoas ilíaco). La última bolsa es la bolsa isquial. Cubre los huesos de la pelvis sobre los que se sienta. Estas bolsas pueden hincharse y doler. Catalino problema se llama bursitis de la cadera. Catalino problema puede mejorar con ejercicios.  General   Antes de iniciar algún programa, pregunte al médico si kamara monique lo permite. Es posible que el médico le pida que trabaje con un entrenador o fisioterapeuta para elaborar un programa seguro de actividad física que cumpla con heidi necesidades.  Ejercicios de estiramiento   Los ejercicios de estiramiento mantienen los músculos flexibles. También evitan que se endurezcan. Para comenzar, realice estos estiramientos 2 o 3 veces. Para que kamara cuerpo produzca cambios, deberá sostener estos estiramientos nora 20 a 30 segundos. Realice estos estiramientos 2 o 3 veces al día. Nick todos los ejercicios de forma lenta.  Estiramientos simples desde las rodillas al pecho: recuéstese sobre la espalda. Lleve rica rodilla hacia el pecho hasta que sienta el estiramiento en la parte baja de la espalda y el área de los glúteos. Repita con la otra rodilla. Si tiene problemas de rodilla, tire de la rodilla sosteniendo la parte posterior del muslo en lugar de lloyd la parte frontal de la rodilla. También puede hacer catalino ejercicio tomando ambas  rodillas al mismo tiempo.  Elongaciones profundas de cadera recostado: recuéstese sobre la espalda y flexione rica rodilla, manteniendo eran pie plano sobre el piso. Cruce la otra pierna sobre la rodilla. Lleve la pierna de abajo hacia el pecho hasta que sienta el estiramiento en el otro glúteo. Repita el ejercicio con la otra pierna abajo.  Elongaciones de isquiotibiales recostado: recuéstese boca arriba con ambas rodillas dobladas y los pies planos sobre el piso. Schlusser la parte posterior del muslo thalia. Enderece la rodilla hasta que sienta la elongación en la parte posterior del muslo. Ahora, tire los dedos del pie hacia abajo hacia la elian. Repita con la otra pierna.  Elongaciones para thompson iliotibial:  Para estirar la thompson iliotibial izquierda: Párese con la pierna derecha cruzada sobre la izquierda. Intente apuntar hacia afuera con los dedos del pie de la pierna frontal. Los dedos de los pies deberían gopi tocarse. Esta es rica posición muy incómoda. Incline la parte superior del cuerpo hacia la derecha mientras dobla la rodilla derecha. Debe sentir rica elongación cerca del lado thalia de la cadera. Mantenga la posición y luego repita.  Para elongar la thompson iliotibial derecha: Párese con la pierna izquierda cruzada sobre la derecha. Intente apuntar hacia afuera con los dedos del pie de la pierna frontal. Los dedos de los pies deberían gopi tocarse. Esta es rica posición muy incómoda. Incline la parte superior del cuerpo hacia la izquierda mientras dobla la rodilla izquierda. Debe sentir rica elongación cerca del lado derecho de la cadera. Mantenga la posición y luego repita.  Ejercicios de fortalecimiento   Los ejercicios de fortalecimiento mantienen heidi músculos firmes y finn. Comience repitiendo cada ejercicio 2 o 3 veces. Progrese hasta hacer cada ejercicio 10 veces. Trate de hacer estos ejercicios 2 o 3 veces al día. Nick todos los ejercicios de forma lenta.  Elevaciones laterales de pierna:  recuéstese de lado. Estire las piernas; deben quedar alineadas con la espalda. Eleve la pierna de arriba con la rodilla recta. No deje que la pierna se vaya hacia adelante. Después, repita del lado contrario.  Elevaciones laterales de piernas con rodilla flexionada: recuéstese de lado con la cadera que le duele en la parte superior. Flexione un poco las rodillas y mantenga los pies y rodillas juntos. Eleve la pierna de arriba mientras mantiene los pies juntos. Baje la pierna y repita.               ¿Cuáles serán los resultados?   Menos dolor e hinchazón  Mejor amplitud de movimiento  Mayor fuerza  Mayor facilidad para caminar y realizar otras actividades  Consejos útiles   Manténgase activo y realice ejercicios para mantener los músculos finn y flexibles.  Mantenga un peso saludable y evite someter la columna a demasiado esfuerzo. Siga rica dieta saludable para mantener los músculos sanos.  No contenga la respiración cuando realice actividad física. [place] puede aumentar la presión arterial. Si tiende a aguantar la respiración, pruebe contar en voz tomas mientras hace ejercicio. Si algún ejercicio le genera malestar, deje de hacerlo inmediatamente.  Siempre caliente antes del estiramiento. Los músculos calientes se estiran más fácil que los fríos. Estirar músculos fríos puede causar lesiones.  Intente caminar o andar en bicicleta a un ritmo lento nora algunos minutos para calentar los músculos. Nick esto de nuevo luego de ejercitar.  No rebote cuando estire.  Ejercitarse antes de la comida es rica buena manera de adoptar rica rutina.  Después de hacer ejercicio, es buena idea usar hielo. Coloque rica compresa de hielo o rica bolsa de guisantes congelados envuelta en rica toalla sobre la parte del cuerpo que le duela. Nunca coloque el hielo directamente sobre la piel. No se deje el hielo nora más de 10 a 15 minutos por vez. Colocar hielo después de la actividad física puede disminuir el dolor y la hinchazón. Nunca  coloque hielo antes de estirarse.  Es posible que se sienta algo incómodo cuando yohan ejercicio, grecia no debería sentir un dolor intenso. Si siente sangeetha agudos, deje lo que está haciendo. Si el dolor fran continúa, llame al médico.  Evite sentarse sobre superficies duras y use un cojín.  ¿Dónde puedo obtener más información?   American Academy [employer]  http://orthoinfo.aaos.org/topic.cfm?jfzde=W75061   NHS Choices  http://www.nhs.uk/Conditions/Bursitis/Pages/Treatment.aspx   Patient  http://www.patient.co.uk/health/greater-trochanteric-pain-syndrome   Exención de responsabilidad y uso de la información del consumidor   Esta información general es un resumen limitado de la información sobre el diagnóstico, el tratamiento y/o la medicación. No pretende ser exhaustivo y debe utilizarse kalee rica herramienta para ayudar al usuario a comprender y/o evaluar las posibles opciones de diagnóstico y tratamiento. NO incluye toda la información sobre las enfermedades, los tratamientos, los medicamentos, los efectos secundarios o los riesgos que pueden aplicarse a un paciente específico. No tiene por objeto ser un consejo médico ni un sustituto del consejo médico. Tampoco pretende reemplazar al diagnóstico o el tratamiento proporcionados por un proveedor de atención médica con base en el examen y la evaluación por parte de catalino proveedor de las circunstancias específicas y únicas de un paciente. Los pacientes deben hablar con un proveedor de atención médica para obtener información completa sobre kamara monique, preguntas médicas y opciones de tratamiento, incluidos los riesgos o beneficios relacionados con el uso de medicamentos. Esta información no respalda ningún tratamiento o medicamento kalee seguro, eficaz o aprobado para tratar a un paciente específico. [employer] y heidi afiliados renuncian a cualquier garantía o responsabilidad relacionada con esta información o con el uso que se yohan de esta. El uso de  esta información se rige por las Condiciones de uso, disponibles en https://www.wolterskluwer.com/en/know/clinical-effectiveness-terms   Copyright   Copyright © 2024 [facility] y heidi licenciantes y/o afiliados. Todos los derechos reservados.

## 2025-02-19 NOTE — PROGRESS NOTES
Name: Jena Ching      : 1975      MRN: [id number]  Encounter Provider: Manjit Krueger MD  Encounter Date: 2025   Encounter department: [facility] CARE [facility]  :  Assessment & Plan  Plantar fasciitis, bilateral  Hx and physical consistent with plantar fasciitis.  Recommend conservative management with rest, cold compress/massaging, and will initiate naproxen 500 mg twice daily with meals for 7 days.  Lastly recommend rest and decreased activity for recovery.. Work note given to return to work on 2025.  Orders:    naproxen (Naprosyn) 500 mg tablet; Take 1 tablet (500 mg total) by mouth 2 (two) times a day with meals for 7 days    Trochanteric bursitis of both hips  History and physical consistent with bilateral trochanteric bursitis.  Recommended conservative management with rest, hip stretching exercises, and initiation naproxen 500 mg twice daily with meals for 7 days.  Patient wishes to get trochanteric bursa injection however due to time constraint, daughter picking her up, she will be returning tomorrow for injections.Lastly recommend rest and decreased activity for recovery..  Work note given to return to work on 2025.  Orders:    naproxen (Naprosyn) 500 mg tablet; Take 1 tablet (500 mg total) by mouth 2 (two) times a day with meals for 7 days    Encounter for screening mammogram for breast cancer  Reminded pt to complete mammogram previously ordered.       Type 2 diabetes mellitus with hyperglycemia, without long-term current use of insulin ([facility])    Lab Results   Component Value Date    HGBA1C 7.9 (A) 2024     Due for repeat A1c next month.  Will be sending refill of Mounjaro 5 mg weekly to be adjusted next month if needed with PCP.  Continue with metformin 1000 mg twice daily empagliflozin 25 mg daily, glipizide 2.5 mg twice daily and Mounjaro 5 mg weekly.  Orders:    Mounjaro 5 MG/0.5ML SOAJ; Inject 5 mg as directed once a  week    Neuropathy  Per EMR patient failed gabapentin 800 mg, Lyrica 150 mg twice daily, and Cymbalta 30 mg twice daily.  Neuropathy likely second to diabetes.  Patient amenable to trialing gabapentin 300 mg 3 times daily.  Which we can continue to adjust and increase as needed to improve burning sensation.  Continue with glucose control.  Orders:    gabapentin (Neurontin) 300 mg capsule; Take 1 capsule (300 mg total) by mouth 3 (three) times a day    Gastroesophageal reflux disease without esophagitis  Patient already using famotidine 20 mg twice daily as needed.  Advised her to use daily since we are starting naproxen and this can aggravate GERD.  She expressed understanding and states she will follow instructions.       Medication refill    Orders:    Mounjaro 5 MG/0.5ML SOAJ; Inject 5 mg as directed once a week           History of Present Illness   Pleasant 49-year-old female with a medical history significant for hypertension, CAD status post LAD proximal stent, T2DM, GERD, history of NSTEMI presented to clinic for evaluation of bilateral leg pain. Started 5 days ago, described as a feeling of great fatigue and heaviness in her legs.  Feeling is typically worsened after sitting for some time.  No recent trauma, hx of surgery, numbness/tingling, weakness.  Also notes pain in both of her heels, worse with first step and slowly improves with ambulation. Does note increase in walking while at work. Yesterday and today tried pregabalin which did help.  Remaining ROS as noted below.      Review of Systems   Constitutional:  Negative for chills and fever.   Respiratory:  Negative for cough and shortness of breath.    Cardiovascular:  Negative for chest pain.   Gastrointestinal:  Negative for abdominal pain, blood in stool, diarrhea, nausea and vomiting.   Musculoskeletal:  Negative for back pain, gait problem, joint swelling and myalgias.   Neurological:  Negative for weakness and numbness.       Objective   BP  "127/73 (BP Location: Left arm, Patient Position: Sitting, Cuff Size: Standard)   Pulse 96   Temp 98.4 °F (36.9 °C) (Tympanic)   Resp 17   Ht 5' 9\" (1.753 m)   Wt 91.9 kg (202 lb 9.6 oz)   SpO2 97%   BMI 29.92 kg/m²      Physical Exam  Vitals and nursing note reviewed.   Constitutional:       Appearance: Normal appearance.   Cardiovascular:      Rate and Rhythm: Normal rate and regular rhythm.      Pulses: Normal pulses.      Heart sounds: Normal heart sounds.   Pulmonary:      Effort: Pulmonary effort is normal.      Breath sounds: Normal breath sounds.   Musculoskeletal:         General: Tenderness present. Normal range of motion.      Cervical back: Normal.      Thoracic back: Normal.      Lumbar back: Normal. No tenderness or bony tenderness. Negative right straight leg raise test and negative left straight leg raise test.      Right hip: Tenderness present. No bony tenderness. Normal range of motion.      Left hip: Tenderness present. No bony tenderness. Normal range of motion.      Right upper leg: Normal.      Left upper leg: Normal.      Right knee: Normal. No swelling, effusion or bony tenderness. Normal range of motion. No tenderness. No LCL laxity, MCL laxity, ACL laxity or PCL laxity. Normal alignment and normal patellar mobility.      Instability Tests: Anterior drawer test negative. Posterior drawer test negative.      Left knee: Normal. No swelling, effusion or bony tenderness. Normal range of motion. No tenderness. No LCL laxity, MCL laxity, ACL laxity or PCL laxity.Normal alignment and normal patellar mobility.      Instability Tests: Anterior drawer test negative. Posterior drawer test negative.      Right lower leg: Normal. No swelling. No edema.      Left lower leg: Normal. No swelling. No edema.      Right ankle: Normal. No tenderness. Normal range of motion. Normal pulse.      Right Achilles Tendon: Normal. No tenderness or defects. Shah's test negative.      Left ankle: Normal. No " tenderness. Normal range of motion. Normal pulse.      Left Achilles Tendon: Normal. No tenderness or defects. Shah's test negative.        Legs:         Feet:       Comments: Tenderness to palpation over bilateral trochanteric bursa.  Tenderness to palpation of plantar fascia bilaterally.   Skin:     General: Skin is warm and dry.   Neurological:      General: No focal deficit present.      Mental Status: She is alert.      Deep Tendon Reflexes:      Reflex Scores:       Patellar reflexes are 2+ on the right side and 2+ on the left side.       Achilles reflexes are 2+ on the right side and 2+ on the left side.  Psychiatric:         Mood and Affect: Mood normal.         Behavior: Behavior normal.

## 2025-02-19 NOTE — ASSESSMENT & PLAN NOTE
Patient already using famotidine 20 mg twice daily as needed.  Advised her to use daily since we are starting naproxen and this can aggravate GERD.  She expressed understanding and states she will follow instructions.

## 2025-02-19 NOTE — LETTER
February 19, 2025     Patient: Jena Ching  YOB: 1975  Date of Visit: 2/19/2025      To Whom it May Concern:    Jena Ching is under my professional care. Jena was seen in my office on 2/19/2025. Jena may return to work on 2/24/2025 .    If you have any questions or concerns, please don't hesitate to call.         Sincerely,          Manjit Krueger MD        CC: No Recipients

## 2025-02-19 NOTE — ASSESSMENT & PLAN NOTE
Per EMR patient failed gabapentin 800 mg, Lyrica 150 mg twice daily, and Cymbalta 30 mg twice daily.  Neuropathy likely second to diabetes.  Patient amenable to trialing gabapentin 300 mg 3 times daily.  Which we can continue to adjust and increase as needed to improve burning sensation.  Continue with glucose control.  Orders:    gabapentin (Neurontin) 300 mg capsule; Take 1 capsule (300 mg total) by mouth 3 (three) times a day

## 2025-02-20 ENCOUNTER — OFFICE VISIT (OUTPATIENT)
Dept: FAMILY MEDICINE CLINIC | Facility: CLINIC | Age: 50
End: 2025-02-20
Payer: COMMERCIAL

## 2025-02-20 ENCOUNTER — APPOINTMENT (OUTPATIENT)
Dept: LAB | Facility: HOSPITAL | Age: 50
End: 2025-02-20
Payer: COMMERCIAL

## 2025-02-20 VITALS
SYSTOLIC BLOOD PRESSURE: 126 MMHG | HEART RATE: 92 BPM | DIASTOLIC BLOOD PRESSURE: 69 MMHG | WEIGHT: 204.8 LBS | BODY MASS INDEX: 30.24 KG/M2 | OXYGEN SATURATION: 98 % | TEMPERATURE: 97.5 F | RESPIRATION RATE: 16 BRPM

## 2025-02-20 DIAGNOSIS — M70.62 TROCHANTERIC BURSITIS OF BOTH HIPS: Primary | ICD-10-CM

## 2025-02-20 DIAGNOSIS — I10 PRIMARY HYPERTENSION: ICD-10-CM

## 2025-02-20 DIAGNOSIS — E11.65 TYPE 2 DIABETES MELLITUS WITH HYPERGLYCEMIA, WITHOUT LONG-TERM CURRENT USE OF INSULIN (HCC): ICD-10-CM

## 2025-02-20 DIAGNOSIS — M70.61 TROCHANTERIC BURSITIS OF BOTH HIPS: Primary | ICD-10-CM

## 2025-02-20 DIAGNOSIS — Z76.0 MEDICATION REFILL: ICD-10-CM

## 2025-02-20 DIAGNOSIS — G62.9 NEUROPATHY: ICD-10-CM

## 2025-02-20 DIAGNOSIS — E55.9 VITAMIN D DEFICIENCY: ICD-10-CM

## 2025-02-20 LAB
25(OH)D3 SERPL-MCNC: 20.4 NG/ML (ref 30–100)
ALBUMIN SERPL BCG-MCNC: 4.3 G/DL (ref 3.5–5)
ALP SERPL-CCNC: 65 U/L (ref 34–104)
ALT SERPL W P-5'-P-CCNC: 19 U/L (ref 7–52)
ANION GAP SERPL CALCULATED.3IONS-SCNC: 9 MMOL/L (ref 4–13)
AST SERPL W P-5'-P-CCNC: 15 U/L (ref 13–39)
BASOPHILS # BLD AUTO: 0.04 THOUSANDS/ΜL (ref 0–0.1)
BASOPHILS NFR BLD AUTO: 1 % (ref 0–1)
BILIRUB SERPL-MCNC: 0.5 MG/DL (ref 0.2–1)
BUN SERPL-MCNC: 15 MG/DL (ref 5–25)
CALCIUM SERPL-MCNC: 9.3 MG/DL (ref 8.4–10.2)
CHLORIDE SERPL-SCNC: 100 MMOL/L (ref 96–108)
CO2 SERPL-SCNC: 28 MMOL/L (ref 21–32)
CREAT SERPL-MCNC: 0.73 MG/DL (ref 0.6–1.3)
EOSINOPHIL # BLD AUTO: 0.18 THOUSAND/ΜL (ref 0–0.61)
EOSINOPHIL NFR BLD AUTO: 3 % (ref 0–6)
ERYTHROCYTE [DISTWIDTH] IN BLOOD BY AUTOMATED COUNT: 13.9 % (ref 11.6–15.1)
GFR SERPL CREATININE-BSD FRML MDRD: 97 ML/MIN/1.73SQ M
GLUCOSE SERPL-MCNC: 141 MG/DL (ref 65–140)
HCT VFR BLD AUTO: 38.7 % (ref 34.8–46.1)
HGB BLD-MCNC: 12.5 G/DL (ref 11.5–15.4)
IMM GRANULOCYTES # BLD AUTO: 0.01 THOUSAND/UL (ref 0–0.2)
IMM GRANULOCYTES NFR BLD AUTO: 0 % (ref 0–2)
LYMPHOCYTES # BLD AUTO: 2.28 THOUSANDS/ΜL (ref 0.6–4.47)
LYMPHOCYTES NFR BLD AUTO: 40 % (ref 14–44)
MCH RBC QN AUTO: 28.3 PG (ref 26.8–34.3)
MCHC RBC AUTO-ENTMCNC: 32.3 G/DL (ref 31.4–37.4)
MCV RBC AUTO: 88 FL (ref 82–98)
MONOCYTES # BLD AUTO: 0.37 THOUSAND/ΜL (ref 0.17–1.22)
MONOCYTES NFR BLD AUTO: 7 % (ref 4–12)
NEUTROPHILS # BLD AUTO: 2.76 THOUSANDS/ΜL (ref 1.85–7.62)
NEUTS SEG NFR BLD AUTO: 49 % (ref 43–75)
NRBC BLD AUTO-RTO: 0 /100 WBCS
PLATELET # BLD AUTO: 203 THOUSANDS/UL (ref 149–390)
PMV BLD AUTO: 11.2 FL (ref 8.9–12.7)
POTASSIUM SERPL-SCNC: 3.8 MMOL/L (ref 3.5–5.3)
PROT SERPL-MCNC: 6.8 G/DL (ref 6.4–8.4)
RBC # BLD AUTO: 4.42 MILLION/UL (ref 3.81–5.12)
SODIUM SERPL-SCNC: 137 MMOL/L (ref 135–147)
VIT B12 SERPL-MCNC: 746 PG/ML (ref 180–914)
WBC # BLD AUTO: 5.64 THOUSAND/UL (ref 4.31–10.16)

## 2025-02-20 PROCEDURE — 80053 COMPREHEN METABOLIC PANEL: CPT

## 2025-02-20 PROCEDURE — 82607 VITAMIN B-12: CPT

## 2025-02-20 PROCEDURE — 20610 DRAIN/INJ JOINT/BURSA W/O US: CPT

## 2025-02-20 PROCEDURE — 82306 VITAMIN D 25 HYDROXY: CPT

## 2025-02-20 PROCEDURE — 36415 COLL VENOUS BLD VENIPUNCTURE: CPT

## 2025-02-20 PROCEDURE — 85025 COMPLETE CBC W/AUTO DIFF WBC: CPT

## 2025-02-20 PROCEDURE — 99214 OFFICE O/P EST MOD 30 MIN: CPT

## 2025-02-20 RX ORDER — TRIAMCINOLONE ACETONIDE 40 MG/ML
40 INJECTION, SUSPENSION INTRA-ARTICULAR; INTRAMUSCULAR
Status: COMPLETED | OUTPATIENT
Start: 2025-02-20 | End: 2025-02-20

## 2025-02-20 RX ORDER — LIDOCAINE HYDROCHLORIDE 10 MG/ML
5 INJECTION, SOLUTION INFILTRATION; PERINEURAL
Status: COMPLETED | OUTPATIENT
Start: 2025-02-20 | End: 2025-02-20

## 2025-02-20 RX ADMIN — LIDOCAINE HYDROCHLORIDE 5 ML: 10 INJECTION, SOLUTION INFILTRATION; PERINEURAL at 16:40

## 2025-02-20 RX ADMIN — TRIAMCINOLONE ACETONIDE 40 MG: 40 INJECTION, SUSPENSION INTRA-ARTICULAR; INTRAMUSCULAR at 16:40

## 2025-02-20 NOTE — PROGRESS NOTES
Name: Jena Ching      : 1975      MRN: [id number]  Encounter Provider: Manjit Krueger MD  Encounter Date: 2025   Encounter department: [facility] CARE [facility]  :  Assessment & Plan  Trochanteric bursitis of both hips  Pt tolerated procedure well with immediate relief in symptoms. ED precautions reviewed but not limited to: joint swelling, joint erythema, severe pain, fevers, chills       Type 2 diabetes mellitus with hyperglycemia, without long-term current use of insulin ([facility])    Lab Results   Component Value Date    HGBA1C 7.9 (A) 2024     Endorsed at home glucose checks typically low 100-200s. Advised to check her glucose closely in the next 1-2 weeks as steroid injections can raise glucose for a short time. Pt aware of hyperglycemic symptoms and states she will keep an eye on her glucose. Stressed the need to adhere to current medication and DM diet to prevent large elevations. ED precautions reviewed.     Orders:    Empagliflozin 25 MG TABS; Take 1 tablet (25 mg total) by mouth every morning    metFORMIN (GLUCOPHAGE) 1000 MG tablet; Take 1 tablet (1,000 mg total) by mouth 2 (two) times a day with meals    Medication refill  Pt went to pharmacy today and did not have refills for empagliflozin and metformin.  Refill sent.  Orders:    Empagliflozin 25 MG TABS; Take 1 tablet (25 mg total) by mouth every morning    metFORMIN (GLUCOPHAGE) 1000 MG tablet; Take 1 tablet (1,000 mg total) by mouth 2 (two) times a day with meals           History of Present Illness   50yo female presenting to clinic for bilateral trochanteric bursitis injection. She was seen yesterday and started on conservative management. She was offered to get injections yesterday however due to time constraint she had to leave. She is endorsing minimal change in symptoms since yesterday.      Review of Systems   Constitutional:  Negative for fever.   Respiratory:  Negative for  shortness of breath.    Cardiovascular:  Negative for chest pain.       Objective   There were no vitals taken for this visit.     Physical Exam  Vitals and nursing note reviewed.   Constitutional:       Appearance: Normal appearance.   Neurological:      Mental Status: She is alert.   Psychiatric:         Mood and Affect: Mood normal.         Behavior: Behavior normal. Behavior is cooperative.       Large joint arthrocentesis: R greater trochanteric bursa  Universal Protocol:  procedure performed by consultantConsent: Verbal consent obtained.  Risks and benefits: risks, benefits and alternatives were discussed  Consent given by: patient  Patient understanding: patient states understanding of the procedure being performed  Site marked: the operative site was marked  Patient identity confirmed: verbally with patient  Supporting Documentation  Indications: pain   Procedure Details  Location: hip - R greater trochanteric bursa  Preparation: Patient was prepped and draped in the usual sterile fashion  Needle size: 25 G  Ultrasound guidance: no  Approach: posterolateral  Medications administered: 5 mL lidocaine 1 %; 40 mg triamcinolone acetonide 40 mg/mL    Patient tolerance: patient tolerated the procedure well with no immediate complications  Dressing:  Sterile dressing applied      Large joint arthrocentesis: L greater trochanteric bursa  Universal Protocol:  procedure performed by consultantConsent: Verbal consent obtained.  Risks and benefits: risks, benefits and alternatives were discussed  Consent given by: patient  Patient understanding: patient states understanding of the procedure being performed  Site marked: the operative site was marked  Patient identity confirmed: verbally with patient  Supporting Documentation  Indications: pain   Procedure Details  Location: hip - L greater trochanteric bursa  Preparation: Patient was prepped and draped in the usual sterile fashion  Needle size: 25 G  Ultrasound guidance:  no  Approach: posterolateral  Medications administered: 5 mL lidocaine 1 %; 40 mg triamcinolone acetonide 40 mg/mL    Patient tolerance: patient tolerated the procedure well with no immediate complications  Dressing:  Sterile dressing applied

## 2025-02-20 NOTE — LETTER
February 20, 2025     Patient: Jena Ching  YOB: 1975  Date of Visit: 2/20/2025      To Whom it May Concern:    Jena Ching is under my professional care. Jena was seen in my office on 2/20/2025. Jena may return to work on 3/3/2025 .    If you have any questions or concerns, please don't hesitate to call.         Sincerely,          Manjit Krueger MD        CC: No Recipients

## 2025-02-20 NOTE — ASSESSMENT & PLAN NOTE
Lab Results   Component Value Date    HGBA1C 7.9 (A) 09/18/2024     Endorsed at home glucose checks typically low 100-200s. Advised to check her glucose closely in the next 1-2 weeks as steroid injections can raise glucose for a short time. Pt aware of hyperglycemic symptoms and states she will keep an eye on her glucose. Stressed the need to adhere to current medication and DM diet to prevent large elevations. ED precautions reviewed.     Orders:    Empagliflozin 25 MG TABS; Take 1 tablet (25 mg total) by mouth every morning    metFORMIN (GLUCOPHAGE) 1000 MG tablet; Take 1 tablet (1,000 mg total) by mouth 2 (two) times a day with meals

## 2025-02-21 ENCOUNTER — RESULTS FOLLOW-UP (OUTPATIENT)
Dept: FAMILY MEDICINE CLINIC | Facility: CLINIC | Age: 50
End: 2025-02-21

## 2025-03-18 ENCOUNTER — OFFICE VISIT (OUTPATIENT)
Dept: FAMILY MEDICINE CLINIC | Facility: CLINIC | Age: 50
End: 2025-03-18
Payer: COMMERCIAL

## 2025-03-18 ENCOUNTER — TELEPHONE (OUTPATIENT)
Age: 50
End: 2025-03-18

## 2025-03-18 VITALS
DIASTOLIC BLOOD PRESSURE: 80 MMHG | BODY MASS INDEX: 29.3 KG/M2 | SYSTOLIC BLOOD PRESSURE: 130 MMHG | TEMPERATURE: 98 F | RESPIRATION RATE: 16 BRPM | OXYGEN SATURATION: 97 % | WEIGHT: 198.4 LBS | HEART RATE: 86 BPM

## 2025-03-18 DIAGNOSIS — E11.65 TYPE 2 DIABETES MELLITUS WITH HYPERGLYCEMIA, WITHOUT LONG-TERM CURRENT USE OF INSULIN (HCC): Primary | ICD-10-CM

## 2025-03-18 DIAGNOSIS — Z12.31 SCREENING MAMMOGRAM FOR BREAST CANCER: ICD-10-CM

## 2025-03-18 DIAGNOSIS — G62.9 NEUROPATHY: ICD-10-CM

## 2025-03-18 DIAGNOSIS — M72.2 PLANTAR FASCIITIS OF RIGHT FOOT: ICD-10-CM

## 2025-03-18 DIAGNOSIS — E78.1 HYPERTRIGLYCERIDEMIA: ICD-10-CM

## 2025-03-18 DIAGNOSIS — I10 PRIMARY HYPERTENSION: ICD-10-CM

## 2025-03-18 LAB — SL AMB POCT HEMOGLOBIN AIC: 8.7 (ref ?–6.5)

## 2025-03-18 PROCEDURE — 83036 HEMOGLOBIN GLYCOSYLATED A1C: CPT | Performed by: PHYSICIAN ASSISTANT

## 2025-03-18 PROCEDURE — 82570 ASSAY OF URINE CREATININE: CPT | Performed by: PHYSICIAN ASSISTANT

## 2025-03-18 PROCEDURE — 99214 OFFICE O/P EST MOD 30 MIN: CPT | Performed by: PHYSICIAN ASSISTANT

## 2025-03-18 PROCEDURE — 82043 UR ALBUMIN QUANTITATIVE: CPT | Performed by: PHYSICIAN ASSISTANT

## 2025-03-18 RX ORDER — CYCLOBENZAPRINE HCL 5 MG
5 TABLET ORAL 3 TIMES DAILY PRN
Qty: 30 TABLET | Refills: 0 | Status: SHIPPED | OUTPATIENT
Start: 2025-03-18

## 2025-03-18 RX ORDER — TIRZEPATIDE 7.5 MG/.5ML
7.5 INJECTION, SOLUTION SUBCUTANEOUS WEEKLY
Qty: 2 ML | Refills: 1 | Status: SHIPPED | OUTPATIENT
Start: 2025-03-18

## 2025-03-19 ENCOUNTER — RESULTS FOLLOW-UP (OUTPATIENT)
Dept: FAMILY MEDICINE CLINIC | Facility: CLINIC | Age: 50
End: 2025-03-19

## 2025-03-19 LAB
CREAT UR-MCNC: 24.9 MG/DL
MICROALBUMIN UR-MCNC: <7 MG/L

## 2025-03-20 PROBLEM — M72.2 PLANTAR FASCIITIS OF RIGHT FOOT: Status: ACTIVE | Noted: 2025-03-20

## 2025-03-20 NOTE — ASSESSMENT & PLAN NOTE
Lab Results   Component Value Date    CHOLESTEROL 161 02/14/2024    CHOLESTEROL 115 07/15/2023    CHOLESTEROL 268 (H) 05/08/2023     Lab Results   Component Value Date    HDL 49 (L) 02/14/2024    HDL 34 (L) 07/15/2023    HDL 36 (L) 05/08/2023     Lab Results   Component Value Date    TRIG 219 (H) 02/14/2024    TRIG 377 (H) 07/15/2023    TRIG 1,091 (H) 05/08/2023     Lab Results   Component Value Date    NONHDLC 112 02/14/2024    NONHDLC 81 07/15/2023    NONHDLC 198 02/03/2023     Continue rosuvastatin 20 mg once daily.  Repeat labs.    Orders:  •  Lipid Panel with Direct LDL reflex; Future

## 2025-03-20 NOTE — ASSESSMENT & PLAN NOTE
Right heel pain, suspect plantar fasciitis, demonstrated technique for taping today in office.  Encouraged supportive shoes and topical diclofenac gel.  Consider referral to podiatry if no improvement.

## 2025-03-20 NOTE — ASSESSMENT & PLAN NOTE
Bilateral legs and feet.  Has tried and failed multiple medications for neuropathy including gabapentin, Lyrica, Cymbalta.  Suspect diabetic neuropathy.  No improvement with gabapentin 600 mg 3 times daily, will discontinue due to lack of improvement and consider retrial at higher doses if unable to tolerate neuropathy, does have history of dizziness and drowsiness with higher doses.  Will start Flexeril 5 mg 3 times daily for cramping pain and follow-up as needed.  Orders:  •  cyclobenzaprine (FLEXERIL) 5 mg tablet; Take 1 tablet (5 mg total) by mouth 3 (three) times a day as needed for muscle spasms

## 2025-03-20 NOTE — PROGRESS NOTES
Name: Jena Ching      : 1975      MRN: [id number]  Encounter Provider: Vandana Bill PA-C  Encounter Date: 3/18/2025   Encounter department: [facility] CARE [facility]  :  Assessment & Plan  Type 2 diabetes mellitus with hyperglycemia, without long-term current use of insulin ([facility])    Lab Results   Component Value Date    HGBA1C 8.7 (A) 2025     Worsening diabetes control, has been compliant on Metformin max dose, Jardiance max dose, glipizide 2.5 mg twice daily, increased dose Mounjaro to 7.5 mg once weekly, tolerating well, decreased appetite but no weight loss.  Encouraged continued diabetic diet.  Orders:  •  Lipid Panel with Direct LDL reflex; Future  •  Albumin / creatinine urine ratio; Future  •  POCT hemoglobin A1c  •  Tirzepatide (Mounjaro) 7.5 MG/0.5ML SOAJ; Inject 7.5 mg under the skin once a week    Neuropathy  Bilateral legs and feet.  Has tried and failed multiple medications for neuropathy including gabapentin, Lyrica, Cymbalta.  Suspect diabetic neuropathy.  No improvement with gabapentin 600 mg 3 times daily, will discontinue due to lack of improvement and consider retrial at higher doses if unable to tolerate neuropathy, does have history of dizziness and drowsiness with higher doses.  Will start Flexeril 5 mg 3 times daily for cramping pain and follow-up as needed.  Orders:  •  cyclobenzaprine (FLEXERIL) 5 mg tablet; Take 1 tablet (5 mg total) by mouth 3 (three) times a day as needed for muscle spasms    Plantar fasciitis of right foot  Right heel pain, suspect plantar fasciitis, demonstrated technique for taping today in office.  Encouraged supportive shoes and topical diclofenac gel.  Consider referral to podiatry if no improvement.        Hypertriglyceridemia  Lab Results   Component Value Date    CHOLESTEROL 161 2024    CHOLESTEROL 115 07/15/2023    CHOLESTEROL 268 (H) 2023     Lab Results   Component Value Date    HDL 49 (L)  02/14/2024    HDL 34 (L) 07/15/2023    HDL 36 (L) 05/08/2023     Lab Results   Component Value Date    TRIG 219 (H) 02/14/2024    TRIG 377 (H) 07/15/2023    TRIG 1,091 (H) 05/08/2023     Lab Results   Component Value Date    NONHDLC 112 02/14/2024    NONHDLC 81 07/15/2023    NONHDLC 198 02/03/2023     Continue rosuvastatin 20 mg once daily.  Repeat labs.    Orders:  •  Lipid Panel with Direct LDL reflex; Future    Screening mammogram for breast cancer    Orders:  •  Mammo screening bilateral w 3d and cad; Future    Primary hypertension  BP Readings from Last 3 Encounters:   03/18/25 130/80   02/20/25 126/69   02/19/25 127/73     Stable blood pressure on losartan 50 mg metoprolol 25 mg daily.  Continue same.              History of Present Illness   Jena is a 49 y.o. female with a h/o diabetes, HLD, HTN, menopause, hx NSTEMI s/p stent for CAD who presents for routine follow-up.  Recently had trochanteric bursitis injections with improvement in hip pain.  Still with right heel pain, ongoing numbness and burning in bilateral feet.  Has been compliant with medication, has not been eating much, poor appetite with injection.  Daughter is moving to [place] and getting , grandson 3 months old now.  Continuing to have same pain in right heel different then neuropathy pain. Has not seen podiatrist yet.    History was conducted in [place]ian without the use of .      Review of Systems   Constitutional:  Negative for chills, fever and unexpected weight change.   Respiratory:  Negative for shortness of breath.    Cardiovascular:  Negative for chest pain.   Musculoskeletal:  Positive for arthralgias and myalgias.   Neurological:  Positive for numbness.       Objective   /80 (BP Location: Left arm, Patient Position: Sitting, Cuff Size: Standard)   Pulse 86   Temp 98 °F (36.7 °C) (Tympanic)   Resp 16   Wt 90 kg (198 lb 6.4 oz)   SpO2 97%   BMI 29.30 kg/m²      Physical Exam  Vitals reviewed.    Constitutional:       Appearance: Normal appearance.   HENT:      Head: Normocephalic and atraumatic.   Cardiovascular:      Rate and Rhythm: Normal rate and regular rhythm.      Pulses:           Dorsalis pedis pulses are 2+ on the right side and 2+ on the left side.        Posterior tibial pulses are 2+ on the right side and 2+ on the left side.   Pulmonary:      Effort: Pulmonary effort is normal.      Breath sounds: Normal breath sounds.   Musculoskeletal:        Feet:    Neurological:      Mental Status: She is alert and oriented to person, place, and time. Mental status is at baseline.

## 2025-03-20 NOTE — ASSESSMENT & PLAN NOTE
Lab Results   Component Value Date    HGBA1C 8.7 (A) 03/18/2025     Worsening diabetes control, has been compliant on Metformin max dose, Jardiance max dose, glipizide 2.5 mg twice daily, increased dose Mounjaro to 7.5 mg once weekly, tolerating well, decreased appetite but no weight loss.  Encouraged continued diabetic diet.  Orders:  •  Lipid Panel with Direct LDL reflex; Future  •  Albumin / creatinine urine ratio; Future  •  POCT hemoglobin A1c  •  Tirzepatide (Mounjaro) 7.5 MG/0.5ML SOAJ; Inject 7.5 mg under the skin once a week

## 2025-03-20 NOTE — ASSESSMENT & PLAN NOTE
BP Readings from Last 3 Encounters:   03/18/25 130/80   02/20/25 126/69   02/19/25 127/73     Stable blood pressure on losartan 50 mg metoprolol 25 mg daily.  Continue same.

## 2025-03-25 ENCOUNTER — TELEPHONE (OUTPATIENT)
Dept: FAMILY MEDICINE CLINIC | Facility: CLINIC | Age: 50
End: 2025-03-25

## 2025-03-25 NOTE — TELEPHONE ENCOUNTER
The patient was called and she said she didn't need to take the medication because the tape had helped her so much and requested more tape. She feels better and expressed gratitude.    The patient reported that the pharmacy didn't give her Mounjaro 7.5 MG because she had already received the 5 MG and it was too early due to insurance.      ----- Message from Vandana Bill PA-C sent at 3/20/2025  8:19 AM EDT -----  Regarding: Heel pain  Please call patient:    Any improvement with muscle relaxer cyclobenzaprine? Still with heel pain? If so, will refer to podiatry

## 2025-03-26 NOTE — TELEPHONE ENCOUNTER
Ok to start Mounjaro 7.5mg with next refill, she can buy paper tape over the counter at any pharmacy or she can stop by the office for paper tape if she needs it in the meantime

## 2025-04-16 DIAGNOSIS — K21.9 GASTROESOPHAGEAL REFLUX DISEASE WITHOUT ESOPHAGITIS: ICD-10-CM

## 2025-04-17 RX ORDER — FAMOTIDINE 20 MG/1
TABLET, FILM COATED ORAL
Qty: 180 TABLET | Refills: 1 | Status: SHIPPED | OUTPATIENT
Start: 2025-04-17

## 2025-04-21 LAB
LEFT EYE DIABETIC RETINOPATHY: NORMAL
RIGHT EYE DIABETIC RETINOPATHY: NORMAL

## 2025-04-28 ENCOUNTER — TELEPHONE (OUTPATIENT)
Dept: FAMILY MEDICINE CLINIC | Facility: CLINIC | Age: 50
End: 2025-04-28

## 2025-04-28 DIAGNOSIS — Z76.0 MEDICATION REFILL: ICD-10-CM

## 2025-04-28 DIAGNOSIS — E11.65 TYPE 2 DIABETES MELLITUS WITH HYPERGLYCEMIA, WITHOUT LONG-TERM CURRENT USE OF INSULIN (HCC): ICD-10-CM

## 2025-05-21 ENCOUNTER — OFFICE VISIT (OUTPATIENT)
Dept: FAMILY MEDICINE CLINIC | Facility: CLINIC | Age: 50
End: 2025-05-21
Payer: COMMERCIAL

## 2025-05-21 VITALS
TEMPERATURE: 97.2 F | WEIGHT: 199.8 LBS | HEART RATE: 100 BPM | DIASTOLIC BLOOD PRESSURE: 67 MMHG | RESPIRATION RATE: 17 BRPM | OXYGEN SATURATION: 98 % | HEIGHT: 69 IN | SYSTOLIC BLOOD PRESSURE: 106 MMHG | BODY MASS INDEX: 29.59 KG/M2

## 2025-05-21 DIAGNOSIS — G62.9 NEUROPATHY: ICD-10-CM

## 2025-05-21 DIAGNOSIS — Z23 ENCOUNTER FOR IMMUNIZATION: ICD-10-CM

## 2025-05-21 DIAGNOSIS — I25.10 CORONARY ARTERY DISEASE INVOLVING NATIVE CORONARY ARTERY OF NATIVE HEART WITHOUT ANGINA PECTORIS: ICD-10-CM

## 2025-05-21 DIAGNOSIS — Z12.31 SCREENING MAMMOGRAM FOR BREAST CANCER: ICD-10-CM

## 2025-05-21 DIAGNOSIS — K21.9 GASTROESOPHAGEAL REFLUX DISEASE WITHOUT ESOPHAGITIS: ICD-10-CM

## 2025-05-21 DIAGNOSIS — M72.2 PLANTAR FASCIITIS OF RIGHT FOOT: ICD-10-CM

## 2025-05-21 DIAGNOSIS — Z00.00 ANNUAL PHYSICAL EXAM: Primary | ICD-10-CM

## 2025-05-21 DIAGNOSIS — E11.65 TYPE 2 DIABETES MELLITUS WITH HYPERGLYCEMIA, WITHOUT LONG-TERM CURRENT USE OF INSULIN (HCC): ICD-10-CM

## 2025-05-21 DIAGNOSIS — I10 PRIMARY HYPERTENSION: ICD-10-CM

## 2025-05-21 PROCEDURE — 99396 PREV VISIT EST AGE 40-64: CPT | Performed by: PHYSICIAN ASSISTANT

## 2025-05-21 PROCEDURE — 90471 IMMUNIZATION ADMIN: CPT

## 2025-05-21 PROCEDURE — 90677 PCV20 VACCINE IM: CPT

## 2025-05-21 PROCEDURE — 99214 OFFICE O/P EST MOD 30 MIN: CPT | Performed by: PHYSICIAN ASSISTANT

## 2025-05-21 RX ORDER — TIRZEPATIDE 10 MG/.5ML
10 INJECTION, SOLUTION SUBCUTANEOUS WEEKLY
Qty: 6 ML | Refills: 1 | Status: SHIPPED | OUTPATIENT
Start: 2025-05-21

## 2025-05-21 RX ORDER — AMOXICILLIN 500 MG/1
CAPSULE ORAL
COMMUNITY
Start: 2025-05-19

## 2025-05-21 RX ORDER — CYCLOBENZAPRINE HCL 5 MG
5 TABLET ORAL 3 TIMES DAILY PRN
Qty: 30 TABLET | Refills: 0 | Status: SHIPPED | OUTPATIENT
Start: 2025-05-21

## 2025-05-21 NOTE — PROGRESS NOTES
Adult Annual Physical  Name: Jena Ching      : 1975      MRN: [id number]  Encounter Provider: Vandana Bill PA-C  Encounter Date: 2025   Encounter department: [facility]    :  Assessment & Plan  Annual physical exam         Type 2 diabetes mellitus with hyperglycemia, without long-term current use of insulin (HCC)    Lab Results   Component Value Date    HGBA1C 8.7 (A) 2025     Increase dose Mounjaro to 10mg once weekly today. Continue with diabetic diet, Jardiance 25mg and metformin max dose and glipizide 2.5mg BID. No hypoglycemic events. Diabetic foot exam today with baseline neuropathy. Had eye exam today, will request report.   Orders:  •  IRIS Diabetic eye exam  •  Tirzepatide (Mounjaro) 10 MG/0.5ML SOAJ; Inject 10 mg under the skin once a week    Neuropathy  Improved with cyclobenzaprine, suspect fibromyalgia contributing as well. Failed multiple medications previous and will continue with muscle relaxer as needed.   Orders:  •  cyclobenzaprine (FLEXERIL) 5 mg tablet; Take 1 tablet (5 mg total) by mouth 3 (three) times a day as needed for muscle spasms    Coronary artery disease involving native coronary artery of native heart without angina pectoris  Has upcoming cardiology appointment scheduled on 25. Continue rosuvastatin 20mg, metoprolol 25mg, Brillinta 60mg BID and aspirin 81mg with hx of NSTEMI in 2023.         Primary hypertension  BP Readings from Last 3 Encounters:   25 106/67   25 130/80   25 126/69     Well-controlled on losartan 50mg and metoprolol 25mg.        Gastroesophageal reflux disease without esophagitis  Improved with famotidine 20mg BID as needed.        Plantar fasciitis of right foot  Improved with taping R foot, continue same.        Screening mammogram for breast cancer  Overdue, patient concerned with paying bills, highly encouraged to schedule.   Orders:  •  Mammo screening bilateral  w 3d and cad; Future    Encounter for immunization    Orders:  •  Pneumococcal Conjugate Vaccine 20-valent (Pcv20)        Preventive Screenings:  - Diabetes Screening: screening not indicated and has diabetes  - Cholesterol Screening: screening not indicated and has hyperlipidemia   - Hepatitis C screening: screening up-to-date   - HIV screening: screening up-to-date   - Cervical cancer screening: screening up-to-date   - Breast cancer screening: orders placed and risks/benefits discussed   - Colon cancer screening: orders placed and risks/benefits discussed   - Lung cancer screening: screening not indicated     Immunizations:  - Immunizations due: Prevnar 20  - Risks/benefits immunizations discussed    - Immunizations given per orders      Counseling/Anticipatory Guidance:  - Alcohol: discussed moderation in alcohol intake and recommendations for healthy alcohol use.   - Drug use: discussed harms of illicit drug use and how it can negatively impact mental/physical health.   - Tobacco use: discussed harms of tobacco use and management options for quitting.   - Dental health: discussed importance of regular tooth brushing, flossing, and dental visits.   - Sexual health: discussed sexually transmitted diseases, partner selection, use of condoms, avoidance of unintended pregnancy, and contraceptive alternatives.   - Diet: discussed recommendations for a healthy/well-balanced diet.   - Exercise: the importance of regular exercise/physical activity was discussed. Recommend exercise 3-5 times per week for at least 30 minutes.   - Injury prevention: discussed safety/seat belts, safety helmets, smoke detectors, carbon monoxide detectors, and smoking near bedding or upholstery.          History of Present Illness     Adult Annual Physical:  Patient presents for annual physical. Jena is a 49 y.o. female with a h/o diabetes, HLD, HTN, menopause, hx NSTEMI s/p stent for CAD who presents for routine annual physical.  Fasting  "sugars still 140s, no weight loss with increased dose. Foot pain much better with taping for plantar fasciitis.  Daughter is moving to [place] and getting  this month, grandson 5 months old now. No smoking or ETOH use.     History was conducted in [place] without the use of .    Overdue for cologuard - patient will complete .     Diet and Physical Activity:  - Diet/Nutrition: well balanced diet and portion control.  - Exercise: walking.    General Health:  - Sleep: sleeps well.  - Hearing: normal hearing bilateral ears.  - Vision: no vision problems and wears glasses. had eye exam today  - Dental: regular dental visits.    /GYN Health:  - Follows with GYN: yes.   - Menopause: postmenopausal.   - History of STDs: no    Advanced Care Planning:  - Has an advanced directive?: no    - Has a durable medical POA?: no    - ACP document given to patient?: no      Review of Systems   Constitutional:  Negative for chills and fever.   HENT:  Negative for ear pain and sore throat.    Eyes:  Negative for pain and visual disturbance.   Respiratory:  Negative for cough and shortness of breath.    Cardiovascular:  Negative for chest pain and palpitations.   Gastrointestinal:  Negative for abdominal pain and vomiting.   Genitourinary:  Negative for dysuria and hematuria.   Musculoskeletal:  Negative for arthralgias and back pain.   Skin:  Negative for color change and rash.   Neurological:  Negative for seizures and syncope.   All other systems reviewed and are negative.        Objective   /67 (BP Location: Left arm, Patient Position: Sitting, Cuff Size: Standard)   Pulse 100   Temp (!) 97.2 °F (36.2 °C) (Tympanic)   Resp 17   Ht 5' 9\" (1.753 m)   Wt 90.6 kg (199 lb 12.8 oz)   SpO2 98%   BMI 29.51 kg/m²     Physical Exam  Vitals and nursing note reviewed.   Constitutional:       General: She is not in acute distress.     Appearance: She is well-developed.   HENT:      Head: Normocephalic and atraumatic. "      Right Ear: Tympanic membrane, ear canal and external ear normal.      Left Ear: Tympanic membrane, ear canal and external ear normal.      Mouth/Throat:      Mouth: Mucous membranes are moist.     Eyes:      Extraocular Movements: Extraocular movements intact.      Conjunctiva/sclera: Conjunctivae normal.      Pupils: Pupils are equal, round, and reactive to light.       Cardiovascular:      Rate and Rhythm: Normal rate and regular rhythm.      Pulses: no weak pulses.           Dorsalis pedis pulses are 2+ on the right side and 2+ on the left side.        Posterior tibial pulses are 2+ on the right side and 2+ on the left side.      Heart sounds: No murmur heard.  Pulmonary:      Effort: Pulmonary effort is normal. No respiratory distress.      Breath sounds: Normal breath sounds.   Abdominal:      Palpations: Abdomen is soft.      Tenderness: There is no abdominal tenderness.     Musculoskeletal:         General: No swelling.      Cervical back: Neck supple.   Feet:      Right foot:      Skin integrity: No ulcer, skin breakdown, erythema, warmth, callus or dry skin.      Left foot:      Skin integrity: No ulcer, skin breakdown, erythema, warmth, callus or dry skin.     Skin:     General: Skin is warm and dry.      Capillary Refill: Capillary refill takes less than 2 seconds.     Neurological:      Mental Status: She is alert.     Psychiatric:         Mood and Affect: Mood normal.         Diabetic Foot Exam    Patient's shoes and socks removed.    Right Foot/Ankle   Right Foot Inspection  Skin Exam: skin normal and skin intact. No dry skin, no warmth, no callus, no erythema, no maceration, no abnormal color, no pre-ulcer, no ulcer and no callus.     Toe Exam: ROM and strength within normal limits.     Sensory   Monofilament testing: diminished    Vascular  Capillary refills: < 3 seconds  The right DP pulse is 2+. The right PT pulse is 2+.     Left Foot/Ankle  Left Foot Inspection  Skin Exam: skin normal and  skin intact. No dry skin, no warmth, no erythema, no maceration, normal color, no pre-ulcer, no ulcer and no callus.     Toe Exam: ROM and strength within normal limits.     Sensory   Monofilament testing: diminished    Vascular  Capillary refills: < 3 seconds  The left DP pulse is 2+. The left PT pulse is 2+.     Assign Risk Category  No deformity present  No loss of protective sensation  No weak pulses  Risk: 0

## 2025-05-21 NOTE — ASSESSMENT & PLAN NOTE
BP Readings from Last 3 Encounters:   05/21/25 106/67   03/18/25 130/80   02/20/25 126/69     Well-controlled on losartan 50mg and metoprolol 25mg.

## 2025-05-21 NOTE — ASSESSMENT & PLAN NOTE
Improved with cyclobenzaprine, suspect fibromyalgia contributing as well. Failed multiple medications previous and will continue with muscle relaxer as needed.   Orders:  •  cyclobenzaprine (FLEXERIL) 5 mg tablet; Take 1 tablet (5 mg total) by mouth 3 (three) times a day as needed for muscle spasms

## 2025-05-21 NOTE — ASSESSMENT & PLAN NOTE
Has upcoming cardiology appointment scheduled on 6/30/25. Continue rosuvastatin 20mg, metoprolol 25mg, Brillinta 60mg BID and aspirin 81mg with hx of NSTEMI in 5/2023.

## 2025-05-21 NOTE — ASSESSMENT & PLAN NOTE
Lab Results   Component Value Date    HGBA1C 8.7 (A) 03/18/2025     Increase dose Mounjaro to 10mg once weekly today. Continue with diabetic diet, Jardiance 25mg and metformin max dose and glipizide 2.5mg BID. No hypoglycemic events. Diabetic foot exam today with baseline neuropathy. Had eye exam today, will request report.   Orders:  •  IRIS Diabetic eye exam  •  Tirzepatide (Mounjaro) 10 MG/0.5ML SOAJ; Inject 10 mg under the skin once a week

## 2025-05-23 ENCOUNTER — TELEPHONE (OUTPATIENT)
Dept: ADMINISTRATIVE | Facility: OTHER | Age: 50
End: 2025-05-23

## 2025-05-23 NOTE — TELEPHONE ENCOUNTER
----- Message from Tammie QUEZADA sent at 5/23/2025  9:17 AM EDT -----  Regarding: care gap request  05/23/25 9:17 Radha, our patient attached above has had Diabetic Eye Exam completed/performed. Please assist in updating the patient chart by pulling the document from the Media Tab. The date of service is 05/23/2025. Thank you,BRENNA Wilson  PRIMARY CARE [facility]

## 2025-05-27 NOTE — TELEPHONE ENCOUNTER
Upon review of the In Basket request we were able to locate, review, and update the patient chart as requested for Diabetic Eye Exam.    Any additional questions or concerns should be emailed to the Practice Liaisons via the appropriate education email address, please do not reply via In Basket.    Thank you  Gina Gonzales   [facility]

## 2025-05-27 NOTE — TELEPHONE ENCOUNTER
05/27/25 9:25 AM     Chart reviewed for Diabetic Eye Exam was/were submitted to the patient's insurance.     Gina Gonzales   [facility]

## 2025-06-06 DIAGNOSIS — E11.65 TYPE 2 DIABETES MELLITUS WITH HYPERGLYCEMIA, WITHOUT LONG-TERM CURRENT USE OF INSULIN (HCC): ICD-10-CM

## 2025-06-06 RX ORDER — TIRZEPATIDE 10 MG/.5ML
10 INJECTION, SOLUTION SUBCUTANEOUS WEEKLY
Qty: 3 ML | Refills: 1 | Status: SHIPPED | OUTPATIENT
Start: 2025-06-06

## 2025-06-10 DIAGNOSIS — G62.9 NEUROPATHY: ICD-10-CM

## 2025-06-10 RX ORDER — CYCLOBENZAPRINE HCL 5 MG
TABLET ORAL
Qty: 30 TABLET | Refills: 0 | Status: SHIPPED | OUTPATIENT
Start: 2025-06-10

## 2025-06-12 ENCOUNTER — TELEPHONE (OUTPATIENT)
Dept: FAMILY MEDICINE CLINIC | Facility: CLINIC | Age: 50
End: 2025-06-12

## 2025-06-12 NOTE — TELEPHONE ENCOUNTER
PA for Mounjaro 10mg SUBMITTED to     via    []CMM-KEY:   [x]Surescripts-Case ID # [id number]   []Availity-Auth ID # NDC #   []Faxed to plan   []Other website   []Phone call Case ID #     [x]PA sent as URGENT    All office notes, labs and other pertaining documents and studies sent. Clinical questions answered. Awaiting determination from insurance company.     Turnaround time for your insurance to make a decision on your Prior Authorization can take 7-21 business days.

## 2025-06-16 NOTE — TELEPHONE ENCOUNTER
PA for Mounjaro 10mg APPROVED     Date(s) approved 6/12/25-6/12/26    Case #[id number]    Patient advised by          [][employer]au[employer] Message  [x]Phone call INTERPRETOR # [phone]  []LMOM  []L/M to call office as no active Communication consent on file  []Unable to leave detailed message as VM not approved on Communication consent       Pharmacy advised by    []Fax  []Phone call  []Secure Chat    Specialty Pharmacy    []     Approval letter scanned into Media No NO LETTER PROVIDED

## 2025-06-28 PROBLEM — E78.2 MIXED HYPERLIPIDEMIA: Status: ACTIVE | Noted: 2022-08-14

## 2025-06-30 ENCOUNTER — TELEPHONE (OUTPATIENT)
Dept: CARDIOLOGY CLINIC | Facility: CLINIC | Age: 50
End: 2025-06-30

## 2025-08-06 DIAGNOSIS — E11.65 TYPE 2 DIABETES MELLITUS WITH HYPERGLYCEMIA, WITHOUT LONG-TERM CURRENT USE OF INSULIN (HCC): ICD-10-CM

## 2025-08-06 RX ORDER — TIRZEPATIDE 10 MG/.5ML
10 INJECTION, SOLUTION SUBCUTANEOUS WEEKLY
Qty: 3 ML | Refills: 1 | Status: SHIPPED | OUTPATIENT
Start: 2025-08-06

## 2025-08-08 RX ORDER — TIRZEPATIDE 10 MG/.5ML
10 INJECTION, SOLUTION SUBCUTANEOUS WEEKLY
Qty: 2 ML | Refills: 0 | OUTPATIENT
Start: 2025-08-08

## 2025-08-21 ENCOUNTER — TELEPHONE (OUTPATIENT)
Dept: FAMILY MEDICINE CLINIC | Facility: CLINIC | Age: 50
End: 2025-08-21

## (undated) DEVICE — TR BAND RADIAL ARTERY COMPRESSION DEVICE: Brand: TR BAND

## (undated) DEVICE — CATH IVUS EAGLE EYE PLATINUM 5FR 150CM

## (undated) DEVICE — CATH GUIDE LAUNCHER 6FR EBU 3.5

## (undated) DEVICE — Device: Brand: ASAHI SILVERWAY

## (undated) DEVICE — Device: Brand: MINAMO

## (undated) DEVICE — Device

## (undated) DEVICE — RADIFOCUS OPTITORQUE ANGIOGRAPHIC CATHETER: Brand: OPTITORQUE

## (undated) DEVICE — TREK CORONARY DILATATION CATHETER 2.50 MM X 15 MM / RAPID-EXCHANGE: Brand: TREK

## (undated) DEVICE — GLIDESHEATH SLENDER STAINLESS STEEL KIT: Brand: GLIDESHEATH SLENDER

## (undated) DEVICE — DGW .035 FC J3MM 260CM TEF: Brand: EMERALD

## (undated) DEVICE — BALLOON NC EUPHORA 4 X 15MM